# Patient Record
Sex: FEMALE | Race: BLACK OR AFRICAN AMERICAN | Employment: FULL TIME | URBAN - METROPOLITAN AREA
[De-identification: names, ages, dates, MRNs, and addresses within clinical notes are randomized per-mention and may not be internally consistent; named-entity substitution may affect disease eponyms.]

---

## 2017-03-27 PROCEDURE — 99283 EMERGENCY DEPT VISIT LOW MDM: CPT

## 2017-03-28 ENCOUNTER — APPOINTMENT (OUTPATIENT)
Dept: GENERAL RADIOLOGY | Age: 32
End: 2017-03-28
Attending: EMERGENCY MEDICINE
Payer: SELF-PAY

## 2017-03-28 ENCOUNTER — HOSPITAL ENCOUNTER (EMERGENCY)
Age: 32
Discharge: HOME | End: 2017-03-28
Attending: EMERGENCY MEDICINE | Admitting: EMERGENCY MEDICINE
Payer: SELF-PAY

## 2017-03-28 VITALS
WEIGHT: 220 LBS | HEART RATE: 92 BPM | RESPIRATION RATE: 16 BRPM | DIASTOLIC BLOOD PRESSURE: 69 MMHG | TEMPERATURE: 98.2 F | SYSTOLIC BLOOD PRESSURE: 156 MMHG | OXYGEN SATURATION: 98 % | HEIGHT: 60 IN | BODY MASS INDEX: 43.19 KG/M2

## 2017-03-28 DIAGNOSIS — M54.9 BACK PAIN, UNSPECIFIED BACK LOCATION, UNSPECIFIED BACK PAIN LATERALITY, UNSPECIFIED CHRONICITY: ICD-10-CM

## 2017-03-28 DIAGNOSIS — R51.9 NONINTRACTABLE HEADACHE, UNSPECIFIED CHRONICITY PATTERN, UNSPECIFIED HEADACHE TYPE: Primary | ICD-10-CM

## 2017-03-28 LAB
APPEARANCE UR: CLEAR
BILIRUB UR QL: NEGATIVE
COLOR UR: YELLOW
GLUCOSE UR STRIP.AUTO-MCNC: NEGATIVE MG/DL
HCG UR QL: NEGATIVE
HGB UR QL STRIP: NEGATIVE
KETONES UR QL STRIP.AUTO: NEGATIVE MG/DL
LEUKOCYTE ESTERASE UR QL STRIP.AUTO: NEGATIVE
NITRITE UR QL STRIP.AUTO: NEGATIVE
PH UR STRIP: 6.5 [PH] (ref 5–8)
PROT UR STRIP-MCNC: NEGATIVE MG/DL
SP GR UR REFRACTOMETRY: 1.02 (ref 1–1.03)
UROBILINOGEN UR QL STRIP.AUTO: 1 EU/DL (ref 0.2–1)

## 2017-03-28 PROCEDURE — 81025 URINE PREGNANCY TEST: CPT | Performed by: EMERGENCY MEDICINE

## 2017-03-28 PROCEDURE — 81003 URINALYSIS AUTO W/O SCOPE: CPT | Performed by: EMERGENCY MEDICINE

## 2017-03-28 PROCEDURE — 71020 XR CHEST PA LAT: CPT

## 2017-03-28 PROCEDURE — 74011250637 HC RX REV CODE- 250/637: Performed by: EMERGENCY MEDICINE

## 2017-03-28 RX ORDER — DIAZEPAM 2 MG/1
2-4 TABLET ORAL
Qty: 18 TAB | Refills: 0 | Status: SHIPPED | OUTPATIENT
Start: 2017-03-28 | End: 2020-06-11

## 2017-03-28 RX ORDER — BUTALBITAL, ACETAMINOPHEN AND CAFFEINE 300; 40; 50 MG/1; MG/1; MG/1
1 CAPSULE ORAL
Qty: 16 CAP | Refills: 0 | Status: SHIPPED | OUTPATIENT
Start: 2017-03-28 | End: 2020-06-11

## 2017-03-28 RX ORDER — DIAZEPAM 5 MG/1
5 TABLET ORAL
Status: COMPLETED | OUTPATIENT
Start: 2017-03-28 | End: 2017-03-28

## 2017-03-28 RX ADMIN — DIAZEPAM 5 MG: 5 TABLET ORAL at 02:18

## 2017-03-28 NOTE — DISCHARGE INSTRUCTIONS
Return for any new or worsening pain, fever, shortness of breath, vomiting, decreased fluid intake, weakness, numbness, dizziness, or any change or concerns. Back Pain: Care Instructions  Your Care Instructions    Back pain has many possible causes. It is often related to problems with muscles and ligaments of the back. It may also be related to problems with the nerves, discs, or bones of the back. Moving, lifting, standing, sitting, or sleeping in an awkward way can strain the back. Sometimes you don't notice the injury until later. Arthritis is another common cause of back pain. Although it may hurt a lot, back pain usually improves on its own within several weeks. Most people recover in 12 weeks or less. Using good home treatment and being careful not to stress your back can help you feel better sooner. Follow-up care is a key part of your treatment and safety. Be sure to make and go to all appointments, and call your doctor if you are having problems. Its also a good idea to know your test results and keep a list of the medicines you take. How can you care for yourself at home? · Sit or lie in positions that are most comfortable and reduce your pain. Try one of these positions when you lie down:  ¨ Lie on your back with your knees bent and supported by large pillows. ¨ Lie on the floor with your legs on the seat of a sofa or chair. Collene Picher on your side with your knees and hips bent and a pillow between your legs. ¨ Lie on your stomach if it does not make pain worse. · Do not sit up in bed, and avoid soft couches and twisted positions. Bed rest can help relieve pain at first, but it delays healing. Avoid bed rest after the first day of back pain. · Change positions every 30 minutes. If you must sit for long periods of time, take breaks from sitting. Get up and walk around, or lie in a comfortable position.   · Try using a heating pad on a low or medium setting for 15 to 20 minutes every 2 or 3 hours. Try a warm shower in place of one session with the heating pad. · You can also try an ice pack for 10 to 15 minutes every 2 to 3 hours. Put a thin cloth between the ice pack and your skin. · Take pain medicines exactly as directed. ¨ If the doctor gave you a prescription medicine for pain, take it as prescribed. ¨ If you are not taking a prescription pain medicine, ask your doctor if you can take an over-the-counter medicine. · Take short walks several times a day. You can start with 5 to 10 minutes, 3 or 4 times a day, and work up to longer walks. Walk on level surfaces and avoid hills and stairs until your back is better. · Return to work and other activities as soon as you can. Continued rest without activity is usually not good for your back. · To prevent future back pain, do exercises to stretch and strengthen your back and stomach. Learn how to use good posture, safe lifting techniques, and proper body mechanics. When should you call for help? Call your doctor now or seek immediate medical care if:  · You have new or worsening numbness in your legs. · You have new or worsening weakness in your legs. (This could make it hard to stand up.)  · You lose control of your bladder or bowels. Watch closely for changes in your health, and be sure to contact your doctor if:  · Your pain gets worse. · You are not getting better after 2 weeks. Where can you learn more? Go to http://aredn-estelita.info/. Enter Y433 in the search box to learn more about \"Back Pain: Care Instructions. \"  Current as of: May 23, 2016  Content Version: 11.1  © 8168-1914 [employer]. Care instructions adapted under license by [employer] (which disclaims liability or warranty for this information).  If you have questions about a medical condition or this instruction, always ask your healthcare professional. [address] any warranty or liability for your use of this information. Headache: Care Instructions  Your Care Instructions    Headaches have many possible causes. Most headaches aren't a sign of a more serious problem, and they will get better on their own. Home treatment may help you feel better faster. The doctor has checked you carefully, but problems can develop later. If you notice any problems or new symptoms, get medical treatment right away. Follow-up care is a key part of your treatment and safety. Be sure to make and go to all appointments, and call your doctor if you are having problems. It's also a good idea to know your test results and keep a list of the medicines you take. How can you care for yourself at home? · Do not drive if you have taken a prescription pain medicine. · Rest in a quiet, dark room until your headache is gone. Close your eyes and try to relax or go to sleep. Don't watch TV or read. · Put a cold, moist cloth or cold pack on the painful area for 10 to 20 minutes at a time. Put a thin cloth between the cold pack and your skin. · Use a warm, moist towel or a heating pad set on low to relax tight shoulder and neck muscles. · Have someone gently massage your neck and shoulders. · Take pain medicines exactly as directed. ¨ If the doctor gave you a prescription medicine for pain, take it as prescribed. ¨ If you are not taking a prescription pain medicine, ask your doctor if you can take an over-the-counter medicine. · Be careful not to take pain medicine more often than the instructions allow, because you may get worse or more frequent headaches when the medicine wears off. · Do not ignore new symptoms that occur with a headache, such as a fever, weakness or numbness, vision changes, or confusion. These may be signs of a more serious problem. To prevent headaches  · Keep a headache diary so you can figure out what triggers your headaches. Avoiding triggers may help you prevent headaches.  Record when each headache began, how long it lasted, and what the pain was like (throbbing, aching, stabbing, or dull). Write down any other symptoms you had with the headache, such as nausea, flashing lights or dark spots, or sensitivity to bright light or loud noise. Note if the headache occurred near your period. List anything that might have triggered the headache, such as certain foods (chocolate, cheese, wine) or odors, smoke, bright light, stress, or lack of sleep. · Find healthy ways to deal with stress. Headaches are most common during or right after stressful times. Take time to relax before and after you do something that has caused a headache in the past.  · Try to keep your muscles relaxed by keeping good posture. Check your jaw, face, neck, and shoulder muscles for tension, and try relaxing them. When sitting at a desk, change positions often, and stretch for 30 seconds each hour. · Get plenty of sleep and exercise. · Eat regularly and well. Long periods without food can trigger a headache. · Treat yourself to a massage. Some people find that regular massages are very helpful in relieving tension. · Limit caffeine by not drinking too much coffee, tea, or soda. But don't quit caffeine suddenly, because that can also give you headaches. · Reduce eyestrain from computers by blinking frequently and looking away from the computer screen every so often. Make sure you have proper eyewear and that your monitor is set up properly, about an arm's length away. · Seek help if you have depression or anxiety. Your headaches may be linked to these conditions. Treatment can both prevent headaches and help with symptoms of anxiety or depression. When should you call for help? Call 911 anytime you think you may need emergency care. For example, call if:  · You have signs of a stroke. These may include:  ¨ Sudden numbness, paralysis, or weakness in your face, arm, or leg, especially on only one side of your body.   ¨ Sudden vision changes. ¨ Sudden trouble speaking. ¨ Sudden confusion or trouble understanding simple statements. ¨ Sudden problems with walking or balance. ¨ A sudden, severe headache that is different from past headaches. Call your doctor now or seek immediate medical care if:  · You have a new or worse headache. · Your headache gets much worse. Where can you learn more? Go to http://arden-estelita.info/. Enter M271 in the search box to learn more about \"Headache: Care Instructions. \"  Current as of: February 19, 2016  Content Version: 11.1  © 3394-1481 [employer]. Care instructions adapted under license by [employer] (which disclaims liability or warranty for this information). If you have questions about a medical condition or this instruction, always ask your healthcare professional. [address] any warranty or liability for your use of this information.

## 2017-03-28 NOTE — ED PROVIDER NOTES
HPI Comments: 1:43 AM Terrall Cowden is a 32 y.o. female with a history of HTN and anemia presenting to the ED with lower back pain that began today. The patient also reports upper L side back pain, mild feet swelling after standing all day, HA, and dizziness as associated symptoms. She has been off of her HTN medication for one year. Says all sx's have resolved except for left upper back pain. Worse w movement. Resolves w rest. No sob or worsening w breating. No cp. No abd pain. No nausea. No leg pain or swelling. No rash. Pt denies nausea, vomiting, diarrhea, fever, CP, and cough. No other complaints at this time. Patient is a 32 y.o. female presenting with back pain. The history is provided by the patient. Back Pain    Associated symptoms include headaches. Pertinent negatives include no chest pain, no fever and no abdominal pain. Past Medical History:   Diagnosis Date    Anemia     HTN (hypertension)        Past Surgical History:   Procedure Laterality Date    HX  SECTION      HX GYN      tubal ligation         History reviewed. No pertinent family history. Social History     Social History    Marital status:      Spouse name: N/A    Number of children: N/A    Years of education: N/A     Occupational History    Not on file. Social History Main Topics    Smoking status: Never Smoker    Smokeless tobacco: Not on file    Alcohol use No    Drug use: Not on file    Sexual activity: Not on file     Other Topics Concern    Not on file     Social History Narrative         ALLERGIES: Review of patient's allergies indicates no known allergies. Review of Systems   Constitutional: Negative for fever. HENT: Negative for congestion. Respiratory: Negative for cough and shortness of breath. Cardiovascular: Negative for chest pain. Gastrointestinal: Negative for abdominal pain and vomiting. Musculoskeletal: Positive for back pain.    Skin: Negative for rash. Neurological: Positive for headaches. Negative for light-headedness. All other systems reviewed and are negative. Vitals:    03/28/17 0029 03/28/17 0033   BP:  156/69   Pulse: 92    Resp: 16    Temp: 98.2 °F (36.8 °C)    SpO2: 98%    Weight: 99.8 kg (220 lb)    Height: 5' (1.524 m)             Physical Exam   Constitutional: She is oriented to person, place, and time. She appears well-developed. HENT:   Head: Normocephalic. Mouth/Throat: Oropharynx is clear and moist.   Eyes: Pupils are equal, round, and reactive to light. Neck: Normal range of motion. Cardiovascular: Normal rate and normal heart sounds. No murmur heard. Pulmonary/Chest: Effort normal. She has no wheezes. She has no rales. Abdominal: Soft. There is no tenderness. Musculoskeletal: Normal range of motion. She exhibits tenderness (+ left parathoracic ttp/spasm. no mdline ttp.  no erythema/warmth/swelling. ). She exhibits no edema. Neurological: She is alert and oriented to person, place, and time. Skin: Skin is warm and dry. Nursing note and vitals reviewed. [facility]  ED Course       Procedures    Vitals:  No data found. Medications ordered:   Medications   diazePAM (VALIUM) tablet 5 mg (5 mg Oral Given 3/28/17 0218)         Lab findings:  No results found for this or any previous visit (from the past 12 hour(s)). X-Ray, CT or other radiology findings or impressions:  XR CHEST PA LAT   Final Result          Progress notes, Consult notes or additional Procedure notes:   No emc, nvi, not c/w cord compresson/cauda equina/fx/abscess. Stable for dc and close f/u. Det ret inst given. Disposition:  Diagnosis:   1. Nonintractable headache, unspecified chronicity pattern, unspecified headache type    2.  Back pain, unspecified back location, unspecified back pain laterality, unspecified chronicity        Disposition: home    Follow-up Information     Follow up With Details Comments Contact Isaac Grover MD Schedule an appointment as soon as possible for a visit in 2 days  [address] [address]  [phone]             Discharge Medication List as of 3/28/2017  2:49 AM      START taking these medications    Details   butalbital-acetaminophen-caff (FIORICET) -40 mg per capsule Take 1 Cap by mouth every six (6) hours as needed for Pain. Max Daily Amount: 4 Caps., Print, Disp-16 Cap, R-0      diazePAM (VALIUM) 2 mg tablet Take 1-2 Tabs by mouth every eight (8) hours as needed for Anxiety. Max Daily Amount: 12 mg., Print, Disp-18 Tab, R-0         CONTINUE these medications which have NOT CHANGED    Details   guaiFENesin-codeine (ROBITUSSIN AC)  mg/5 mL solution Take 5 mL by mouth three (3) times daily as needed for Cough. Max Daily Amount: 15 mL. , Print, Disp-120 mL, R-0      ondansetron (ZOFRAN ODT) 4 mg disintegrating tablet Take 1 Tab by mouth every eight (8) hours as needed for Nausea. , Print, Disp-6 Tab, R-0         STOP taking these medications       butalbital-acetaminophen-caffeine (FIORICET, ESGIC) -40 mg per tablet Comments:   Reason for Stopping:             SCRIBE ATTESTATION STATEMENT  Documented by: Lucas Ackerman scribing for, and in the presence of,No att. providers found      Signed by: Cornelio Alanis, 03/29/17, 1:49 AM    PROVIDER ATTESTATION STATEMENT  I personally performed the services described in the documentation, reviewed the documentation, as recorded by the scribe in my presence, and it accurately and completely records my words and actions.   No att. providers found

## 2017-03-28 NOTE — ED TRIAGE NOTES
Pt with lower back pain, upper left side back pain ,   Headache , dizziness today   Pt states that she has been off bp meds for 1 year,   Pt c/o slight feet swelling after standing all day at work

## 2017-03-28 NOTE — LETTER
NOTIFICATION RETURN TO WORK / SCHOOL 
 
3/28/2017 2:50 AM 
 
Ms. Marah Roe [address] [facility], [place] [address] To Whom It May Concern: 
 
Marah Roe is currently under the care of [id number] [facility] EMERGENCY DEPT. She will return to work/school on: 3/31/17 If there are questions or concerns please have the patient contact our office.  
 
 
 
Sincerely, 
 
 
 
Verenice Barnes MD

## 2018-03-02 ENCOUNTER — HOSPITAL ENCOUNTER (EMERGENCY)
Age: 33
Discharge: HOME | End: 2018-03-02
Attending: EMERGENCY MEDICINE
Payer: SELF-PAY

## 2018-03-02 VITALS
TEMPERATURE: 97.6 F | DIASTOLIC BLOOD PRESSURE: 91 MMHG | HEART RATE: 88 BPM | RESPIRATION RATE: 20 BRPM | SYSTOLIC BLOOD PRESSURE: 139 MMHG | OXYGEN SATURATION: 99 % | HEIGHT: 60 IN | BODY MASS INDEX: 41.23 KG/M2 | WEIGHT: 210 LBS

## 2018-03-02 DIAGNOSIS — L20.9 ATOPIC DERMATITIS, UNSPECIFIED TYPE: Primary | ICD-10-CM

## 2018-03-02 PROCEDURE — 99282 EMERGENCY DEPT VISIT SF MDM: CPT

## 2018-03-02 RX ORDER — BENAZEPRIL/HYDROCHLOROTHIAZIDE 20 MG-25MG
TABLET ORAL DAILY
COMMUNITY
End: 2020-06-11

## 2018-03-02 RX ORDER — HYDROCORTISONE 1 %
CREAM (GRAM) TOPICAL 2 TIMES DAILY
Qty: 30 G | Refills: 0 | Status: SHIPPED | OUTPATIENT
Start: 2018-03-02 | End: 2020-02-02

## 2018-03-02 RX ORDER — BISMUTH SUBSALICYLATE 262 MG
1 TABLET,CHEWABLE ORAL DAILY
COMMUNITY

## 2018-03-02 NOTE — ED PROVIDER NOTES
EMERGENCY DEPARTMENT HISTORY AND PHYSICAL EXAM    Date: 3/2/2018  Patient Name: Rama Lindsey    History of Presenting Illness     Chief Complaint   Patient presents with    Finger Pain     left hand, ring finger         History Provided By: Patient    Chief Complaint: Rash  Duration: 1 Days  Timing:  Gradual  Location: Left hand  Quality: Burning  Severity: 3 out of 10  Modifying Factors: Improves with motrin  Associated Symptoms: denies any other associated signs or symptoms      Additional History (Context): Rama Lindsey is a 28 y.o. female with hypertension who presents with report of rash to the left hand onset yesterday. Pt states rash began on the dorsal aspect of left ring finger and has since spread to the palmar side of the second and fifth digit as well as to the dorsal aspect of the wrist. No fevers, chills, lesions in mouth. PCP: Chas Donovan MD    Current Outpatient Prescriptions   Medication Sig Dispense Refill    multivitamin (ONE A DAY) tablet Take 1 Tab by mouth daily.  benazepril-hydroCHLOROthiazide (LOTENSIN HCT) 20-25 mg per tablet Take  by mouth daily.  hydrocortisone (CORTISONE) 1 % topical cream Apply  to affected area two (2) times a day. use thin layer 30 g 0    butalbital-acetaminophen-caff (FIORICET) -40 mg per capsule Take 1 Cap by mouth every six (6) hours as needed for Pain. Max Daily Amount: 4 Caps. 16 Cap 0    diazePAM (VALIUM) 2 mg tablet Take 1-2 Tabs by mouth every eight (8) hours as needed for Anxiety. Max Daily Amount: 12 mg. 18 Tab 0    guaiFENesin-codeine (ROBITUSSIN AC)  mg/5 mL solution Take 5 mL by mouth three (3) times daily as needed for Cough. Max Daily Amount: 15 mL. 120 mL 0    ondansetron (ZOFRAN ODT) 4 mg disintegrating tablet Take 1 Tab by mouth every eight (8) hours as needed for Nausea.  6 Tab 0       Past History     Past Medical History:  Past Medical History:   Diagnosis Date    Anemia     HTN (hypertension)        Past Surgical History:  Past Surgical History:   Procedure Laterality Date    HX  SECTION      HX GYN      tubal ligation       Family History:  No family history on file. Social History:  Social History   Substance Use Topics    Smoking status: Never Smoker    Smokeless tobacco: Not on file    Alcohol use No       Allergies:  No Known Allergies      Review of Systems   Review of Systems   Constitutional: Negative for chills and fever. HENT: Negative for mouth sores. Skin: Positive for rash. All Other Systems Negative  Physical Exam     Vitals:    18 1116   BP: (!) 139/91   Pulse: 88   Resp: 20   Temp: 97.6 °F (36.4 °C)   SpO2: 99%   Weight: 95.3 kg (210 lb)   Height: 5' (1.524 m)     Physical Exam   Constitutional: She is oriented to person, place, and time. She appears well-developed and well-nourished. HENT:   Head: Normocephalic and atraumatic. Right Ear: External ear normal.   Left Ear: External ear normal.   Nose: Nose normal.   Mouth/Throat: Oropharynx is clear and moist. No oropharyngeal exudate. No lesions noted. Eyes: Conjunctivae are normal.   Neck: Normal range of motion. Cardiovascular: Normal rate, regular rhythm and normal heart sounds. Pulmonary/Chest: Effort normal and breath sounds normal. No respiratory distress. She has no wheezes. Neurological: She is alert and oriented to person, place, and time. Skin: Skin is warm and dry. Left hand: Papulovesicular lesions noted to the dorsal aspect of the left third digit, three papulovesicular lesions noted to the dorsal aspect of the radial aspect of the wrist and single erythematous macular lesion noted to the palmar aspect of the second digit. No burrows noted between the web spaces bilaterally. No plantar rash. Psychiatric: She has a normal mood and affect. Her behavior is normal.   Vitals reviewed.            Diagnostic Study Results     Labs -   No results found for this or any previous visit (from the past 12 hour(s)). Radiologic Studies -   No orders to display     CT Results  (Last 48 hours)    None        CXR Results  (Last 48 hours)    None            Medical Decision Making   I am the first provider for this patient. I reviewed the vital signs, available nursing notes, past medical history, past surgical history, family history and social history. Vital Signs-Reviewed the patient's vital signs. Pulse Oximetry Analysis - 99% on RA    Records Reviewed: Nursing Notes    Procedures:  Procedures    Provider Notes (Medical Decision Making): Pt with localized rash to the left extremity. Not dermatomal in nature, no other sx. Most c/w atopic dermatitis. Due to mild distribution, do not feel systemic steroids are necessary at this time. Will treat symptoms with anti-histamine and advise topical steroid cream.    MED RECONCILIATION:  No current facility-administered medications for this encounter. Current Outpatient Prescriptions   Medication Sig    multivitamin (ONE A DAY) tablet Take 1 Tab by mouth daily.  benazepril-hydroCHLOROthiazide (LOTENSIN HCT) 20-25 mg per tablet Take  by mouth daily.  hydrocortisone (CORTISONE) 1 % topical cream Apply  to affected area two (2) times a day. use thin layer    butalbital-acetaminophen-caff (FIORICET) -40 mg per capsule Take 1 Cap by mouth every six (6) hours as needed for Pain. Max Daily Amount: 4 Caps.  diazePAM (VALIUM) 2 mg tablet Take 1-2 Tabs by mouth every eight (8) hours as needed for Anxiety. Max Daily Amount: 12 mg.    guaiFENesin-codeine (ROBITUSSIN AC)  mg/5 mL solution Take 5 mL by mouth three (3) times daily as needed for Cough. Max Daily Amount: 15 mL.  ondansetron (ZOFRAN ODT) 4 mg disintegrating tablet Take 1 Tab by mouth every eight (8) hours as needed for Nausea. Disposition:  Discharge. DISCHARGE NOTE:   11:39 AM  All of pt's questions and concerns were addressed.  Patient was instructed and agrees to follow up with PCP next week, as well as to return to the ED upon further deterioration. Patient is ready to go home. Follow-up Information     Follow up With Details Comments [address], MD  as scheduled [address] [facility] EMERGENCY DEPT  As needed, If symptoms worsen [address] [address]  [facility]  for further evaluation of rash [address]  [phone]          Current Discharge Medication List      START taking these medications    Details   hydrocortisone (CORTISONE) 1 % topical cream Apply  to affected area two (2) times a day. use thin layer  Qty: 30 g, Refills: 0                 Diagnosis     Clinical Impression:   1.  Atopic dermatitis, unspecified type            Thomas Minaya PA-C 11:43 AM

## 2018-03-02 NOTE — ED TRIAGE NOTES
C/o raised reddened area to left hand, ring finger accompanied with swelling and numbleness. Attempted to see PCP, unsuccessful buit advised via T/C  to take Benadryl and Motrin. Concerned  ? Spider Bite. Pain 5/10, improved 3/10 on arirval to ED. Pain reduced currently after taking Motrin 400mg  @ home 0800 am. Benadryl taken 03/01/2018 2000.

## 2018-03-02 NOTE — DISCHARGE INSTRUCTIONS
Atopic Dermatitis: Care Instructions  Your Care Instructions  Atopic dermatitis (also called eczema) is a skin problem that causes intense itching and a red, raised rash. In severe cases, the rash develops clear fluid-filled blisters. The rash is not contagious. People with this condition seem to have very sensitive immune systems that are likely to react to things that cause allergies. The immune system is the body's way of fighting infection. There is no cure for atopic dermatitis, but you may be able to control it with care at home. Follow-up care is a key part of your treatment and safety. Be sure to make and go to all appointments, and call your doctor if you are having problems. It's also a good idea to know your test results and keep a list of the medicines you take. How can you care for yourself at home? · Use moisturizer at least twice a day. · If your doctor prescribes a cream, use it as directed. If your doctor prescribes other medicine, take it exactly as directed. · Wash the affected area with water only. Soap can make dryness and itching worse. Pat dry. · Apply a moisturizer after bathing. Use a cream such as Lubriderm, Moisturel, or Cetaphil that does not irritate the skin or cause a rash. Apply the cream while your skin is still damp after lightly drying with a towel. · Use cold, wet cloths to reduce itching. · Keep cool, and stay out of the sun. · If itching affects your normal activities, an over-the-counter antihistamine, such as diphenhydramine (Benadryl) or loratadine (Claritin) may help. Read and follow all instructions on the label. When should you call for help? Call your doctor now or seek immediate medical care if:  ? · Your rash gets worse and you have a fever. ? · You have new blisters or bruises, or the rash spreads and looks like a sunburn. ? · You have signs of infection, such as:  ¨ Increased pain, swelling, warmth, or redness.   ¨ Red streaks leading from the rash.  ¨ Pus draining from the rash. ¨ A fever. ? · You have crusting or oozing sores. ? · You have joint aches or body aches along with your rash. ? Watch closely for changes in your health, and be sure to contact your doctor if:  ? · Your rash does not clear up after 2 to 3 weeks of home treatment. ? · Itching interferes with your sleep or daily activities. Where can you learn more? Go to http://arden-estelita.info/. Enter F939 in the search box to learn more about \"Atopic Dermatitis: Care Instructions. \"  Current as of: October 13, 2016  Content Version: 11.4  © 1952-9391 [employer]. Care instructions adapted under license by [employer] (which disclaims liability or warranty for this information). If you have questions about a medical condition or this instruction, always ask your healthcare professional. [place] [address] any warranty or liability for your use of this information.

## 2018-06-05 ENCOUNTER — HOSPITAL ENCOUNTER (OUTPATIENT)
Dept: ULTRASOUND IMAGING | Age: 33
Discharge: HOME | End: 2018-06-05
Attending: NURSE PRACTITIONER
Payer: SELF-PAY

## 2018-06-05 DIAGNOSIS — N93.8 DYSFUNCTIONAL UTERINE BLEEDING: ICD-10-CM

## 2018-06-05 PROCEDURE — 76830 TRANSVAGINAL US NON-OB: CPT

## 2019-05-07 ENCOUNTER — HOSPITAL ENCOUNTER (OUTPATIENT)
Dept: LAB | Age: 34
Discharge: HOME | End: 2019-05-07
Payer: COMMERCIAL

## 2019-05-07 LAB
ALBUMIN SERPL-MCNC: 3.5 G/DL (ref 3.4–5)
ALBUMIN/GLOB SERPL: 0.8 {RATIO} (ref 0.8–1.7)
ALP SERPL-CCNC: 90 U/L (ref 45–117)
ALT SERPL-CCNC: 21 U/L (ref 13–56)
ANION GAP SERPL CALC-SCNC: 5 MMOL/L (ref 3–18)
AST SERPL-CCNC: 15 U/L (ref 15–37)
BASOPHILS # BLD: 0 K/UL (ref 0–0.1)
BASOPHILS NFR BLD: 1 % (ref 0–2)
BILIRUB SERPL-MCNC: 0.3 MG/DL (ref 0.2–1)
BUN SERPL-MCNC: 12 MG/DL (ref 7–18)
BUN/CREAT SERPL: 15 (ref 12–20)
CALCIUM SERPL-MCNC: 8.7 MG/DL (ref 8.5–10.1)
CHLORIDE SERPL-SCNC: 102 MMOL/L (ref 100–108)
CHOLEST SERPL-MCNC: 181 MG/DL
CO2 SERPL-SCNC: 30 MMOL/L (ref 21–32)
CREAT SERPL-MCNC: 0.8 MG/DL (ref 0.6–1.3)
DIFFERENTIAL METHOD BLD: ABNORMAL
EOSINOPHIL # BLD: 0.3 K/UL (ref 0–0.4)
EOSINOPHIL NFR BLD: 5 % (ref 0–5)
ERYTHROCYTE [DISTWIDTH] IN BLOOD BY AUTOMATED COUNT: 16.2 % (ref 11.6–14.5)
GLOBULIN SER CALC-MCNC: 4.4 G/DL (ref 2–4)
GLUCOSE SERPL-MCNC: 89 MG/DL (ref 74–99)
HCT VFR BLD AUTO: 32.7 % (ref 35–45)
HDLC SERPL-MCNC: 38 MG/DL (ref 40–60)
HDLC SERPL: 4.8 {RATIO} (ref 0–5)
HGB BLD-MCNC: 10.1 G/DL (ref 12–16)
LDLC SERPL CALC-MCNC: 133.2 MG/DL (ref 0–100)
LIPID PROFILE,FLP: ABNORMAL
LYMPHOCYTES # BLD: 2.1 K/UL (ref 0.9–3.6)
LYMPHOCYTES NFR BLD: 37 % (ref 21–52)
MCH RBC QN AUTO: 23.4 PG (ref 24–34)
MCHC RBC AUTO-ENTMCNC: 30.9 G/DL (ref 31–37)
MCV RBC AUTO: 75.7 FL (ref 74–97)
MONOCYTES # BLD: 0.4 K/UL (ref 0.05–1.2)
MONOCYTES NFR BLD: 7 % (ref 3–10)
NEUTS SEG # BLD: 2.8 K/UL (ref 1.8–8)
NEUTS SEG NFR BLD: 50 % (ref 40–73)
PLATELET # BLD AUTO: 516 K/UL (ref 135–420)
PMV BLD AUTO: 8.8 FL (ref 9.2–11.8)
POTASSIUM SERPL-SCNC: 3.9 MMOL/L (ref 3.5–5.5)
PROT SERPL-MCNC: 7.9 G/DL (ref 6.4–8.2)
RBC # BLD AUTO: 4.32 M/UL (ref 4.2–5.3)
SODIUM SERPL-SCNC: 137 MMOL/L (ref 136–145)
TRIGL SERPL-MCNC: 49 MG/DL (ref ?–150)
TSH SERPL DL<=0.05 MIU/L-ACNC: 2.44 UIU/ML (ref 0.36–3.74)
VLDLC SERPL CALC-MCNC: 9.8 MG/DL
WBC # BLD AUTO: 5.6 K/UL (ref 4.6–13.2)

## 2019-05-07 PROCEDURE — 80061 LIPID PANEL: CPT

## 2019-05-07 PROCEDURE — 36415 COLL VENOUS BLD VENIPUNCTURE: CPT

## 2019-05-07 PROCEDURE — 85025 COMPLETE CBC W/AUTO DIFF WBC: CPT

## 2019-05-07 PROCEDURE — 84443 ASSAY THYROID STIM HORMONE: CPT

## 2019-05-07 PROCEDURE — 80053 COMPREHEN METABOLIC PANEL: CPT

## 2020-02-02 ENCOUNTER — HOSPITAL ENCOUNTER (EMERGENCY)
Age: 35
Discharge: HOME | End: 2020-02-02
Attending: EMERGENCY MEDICINE
Payer: COMMERCIAL

## 2020-02-02 VITALS
DIASTOLIC BLOOD PRESSURE: 90 MMHG | HEART RATE: 90 BPM | TEMPERATURE: 98.5 F | WEIGHT: 210 LBS | RESPIRATION RATE: 20 BRPM | BODY MASS INDEX: 41.01 KG/M2 | SYSTOLIC BLOOD PRESSURE: 145 MMHG | OXYGEN SATURATION: 100 %

## 2020-02-02 DIAGNOSIS — D50.0 IRON DEFICIENCY ANEMIA DUE TO CHRONIC BLOOD LOSS: ICD-10-CM

## 2020-02-02 DIAGNOSIS — N93.9 VAGINAL BLEEDING, ABNORMAL: Primary | ICD-10-CM

## 2020-02-02 LAB
ALBUMIN SERPL-MCNC: 3.2 G/DL (ref 3.4–5)
ALBUMIN/GLOB SERPL: 0.7 {RATIO} (ref 0.8–1.7)
ALP SERPL-CCNC: 86 U/L (ref 45–117)
ALT SERPL-CCNC: 18 U/L (ref 13–56)
ANION GAP SERPL CALC-SCNC: 6 MMOL/L (ref 3–18)
APPEARANCE UR: CLEAR
APTT PPP: 28.6 SEC (ref 23–36.4)
AST SERPL-CCNC: 14 U/L (ref 10–38)
BACTERIA URNS QL MICRO: ABNORMAL /HPF
BASOPHILS # BLD: 0 K/UL (ref 0–0.1)
BASOPHILS NFR BLD: 0 % (ref 0–2)
BILIRUB SERPL-MCNC: 0.1 MG/DL (ref 0.2–1)
BILIRUB UR QL: NEGATIVE
BUN SERPL-MCNC: 12 MG/DL (ref 7–18)
BUN/CREAT SERPL: 16 (ref 12–20)
CALCIUM SERPL-MCNC: 8.6 MG/DL (ref 8.5–10.1)
CHLORIDE SERPL-SCNC: 106 MMOL/L (ref 100–111)
CO2 SERPL-SCNC: 28 MMOL/L (ref 21–32)
COLOR UR: YELLOW
CREAT SERPL-MCNC: 0.75 MG/DL (ref 0.6–1.3)
DIFFERENTIAL METHOD BLD: ABNORMAL
EOSINOPHIL # BLD: 0.2 K/UL (ref 0–0.4)
EOSINOPHIL NFR BLD: 2 % (ref 0–5)
EPITH CASTS URNS QL MICRO: NEGATIVE /LPF (ref 0–5)
ERYTHROCYTE [DISTWIDTH] IN BLOOD BY AUTOMATED COUNT: 16.8 % (ref 11.6–14.5)
GLOBULIN SER CALC-MCNC: 4.5 G/DL (ref 2–4)
GLUCOSE SERPL-MCNC: 131 MG/DL (ref 74–99)
GLUCOSE UR STRIP.AUTO-MCNC: NEGATIVE MG/DL
HCG SERPL QL: NEGATIVE
HCG UR QL: NEGATIVE
HCT VFR BLD AUTO: 27.8 % (ref 35–45)
HGB BLD-MCNC: 8.3 G/DL (ref 12–16)
HGB UR QL STRIP: ABNORMAL
INR PPP: 1.1 (ref 0.8–1.2)
KETONES UR QL STRIP.AUTO: NEGATIVE MG/DL
LEUKOCYTE ESTERASE UR QL STRIP.AUTO: NEGATIVE
LIPASE SERPL-CCNC: 133 U/L (ref 73–393)
LYMPHOCYTES # BLD: 2.4 K/UL (ref 0.9–3.6)
LYMPHOCYTES NFR BLD: 30 % (ref 21–52)
MCH RBC QN AUTO: 22.5 PG (ref 24–34)
MCHC RBC AUTO-ENTMCNC: 29.9 G/DL (ref 31–37)
MCV RBC AUTO: 75.3 FL (ref 74–97)
MONOCYTES # BLD: 0.7 K/UL (ref 0.05–1.2)
MONOCYTES NFR BLD: 8 % (ref 3–10)
NEUTS SEG # BLD: 4.7 K/UL (ref 1.8–8)
NEUTS SEG NFR BLD: 60 % (ref 40–73)
NITRITE UR QL STRIP.AUTO: NEGATIVE
PH UR STRIP: 5.5 [PH] (ref 5–8)
PLATELET # BLD AUTO: 538 K/UL (ref 135–420)
PMV BLD AUTO: 9 FL (ref 9.2–11.8)
POTASSIUM SERPL-SCNC: 3.7 MMOL/L (ref 3.5–5.5)
PROT SERPL-MCNC: 7.7 G/DL (ref 6.4–8.2)
PROT UR STRIP-MCNC: NEGATIVE MG/DL
PROTHROMBIN TIME: 13.8 SEC (ref 11.5–15.2)
RBC # BLD AUTO: 3.69 M/UL (ref 4.2–5.3)
RBC #/AREA URNS HPF: ABNORMAL /HPF (ref 0–5)
SODIUM SERPL-SCNC: 140 MMOL/L (ref 136–145)
SP GR UR REFRACTOMETRY: 1.02 (ref 1–1.03)
UROBILINOGEN UR QL STRIP.AUTO: 1 EU/DL (ref 0.2–1)
WBC # BLD AUTO: 7.9 K/UL (ref 4.6–13.2)
WBC URNS QL MICRO: ABNORMAL /HPF (ref 0–4)

## 2020-02-02 PROCEDURE — 81001 URINALYSIS AUTO W/SCOPE: CPT

## 2020-02-02 PROCEDURE — 74011000250 HC RX REV CODE- 250: Performed by: EMERGENCY MEDICINE

## 2020-02-02 PROCEDURE — 81025 URINE PREGNANCY TEST: CPT

## 2020-02-02 PROCEDURE — 96365 THER/PROPH/DIAG IV INF INIT: CPT

## 2020-02-02 PROCEDURE — 74011000258 HC RX REV CODE- 258: Performed by: EMERGENCY MEDICINE

## 2020-02-02 PROCEDURE — 99285 EMERGENCY DEPT VISIT HI MDM: CPT

## 2020-02-02 PROCEDURE — 84703 CHORIONIC GONADOTROPIN ASSAY: CPT

## 2020-02-02 PROCEDURE — 83690 ASSAY OF LIPASE: CPT

## 2020-02-02 PROCEDURE — 85610 PROTHROMBIN TIME: CPT

## 2020-02-02 PROCEDURE — 85730 THROMBOPLASTIN TIME PARTIAL: CPT

## 2020-02-02 PROCEDURE — 85025 COMPLETE CBC W/AUTO DIFF WBC: CPT

## 2020-02-02 PROCEDURE — 80053 COMPREHEN METABOLIC PANEL: CPT

## 2020-02-02 RX ADMIN — TRANEXAMIC ACID 1000 MG: 100 INJECTION, SOLUTION INTRAVENOUS at 05:09

## 2020-02-02 NOTE — ED PROVIDER NOTES
HPI patient is a 49-year-old female with metromenorrhagia. Patient states she has been having her period for a month now and tonight she passed a big large clot and became afraid. No abdominal pain. Past Medical History:   Diagnosis Date    Anemia     HTN (hypertension)        Past Surgical History:   Procedure Laterality Date    HX  SECTION      HX GYN      tubal ligation         History reviewed. No pertinent family history. Social History     Socioeconomic History    Marital status:      Spouse name: Not on file    Number of children: Not on file    Years of education: Not on file    Highest education level: Not on file   Occupational History    Not on file   Social Needs    Financial resource strain: Not on file    Food insecurity:     Worry: Not on file     Inability: Not on file    Transportation needs:     Medical: Not on file     Non-medical: Not on file   Tobacco Use    Smoking status: Never Smoker   Substance and Sexual Activity    Alcohol use: No    Drug use: Not on file    Sexual activity: Not on file   Lifestyle    Physical activity:     Days per week: Not on file     Minutes per session: Not on file    Stress: Not on file   Relationships    Social connections:     Talks on phone: Not on file     Gets together: Not on file     Attends [employer] service: Not on file     Active member of club or organization: Not on file     Attends meetings of clubs or organizations: Not on file     Relationship status: Not on file    Intimate partner violence:     Fear of current or ex partner: Not on file     Emotionally abused: Not on file     Physically abused: Not on file     Forced sexual activity: Not on file   Other Topics Concern    Not on file   Social History Narrative    Not on file         ALLERGIES: Patient has no known allergies. Review of Systems   Constitutional: Negative. HENT: Negative. Eyes: Negative. Respiratory: Negative.     Cardiovascular: Negative. Gastrointestinal: Negative. Endocrine: Negative. Genitourinary: Negative. Musculoskeletal: Negative. Skin: Negative. Allergic/Immunologic: Negative. Neurological: Negative. Hematological: Negative. Psychiatric/Behavioral: Negative. All other systems reviewed and are negative. Vitals:    02/02/20 0302   BP: (!) 155/99   Pulse: 96   Resp: 16   Temp: 99.2 °F (37.3 °C)   SpO2: 99%   Weight: 95.3 kg (210 lb)            Physical Exam  Vitals signs and nursing note reviewed. Constitutional:       General: She is not in acute distress. Appearance: She is well-developed. She is not diaphoretic. HENT:      Head: Normocephalic. Right Ear: External ear normal.      Left Ear: External ear normal.      Mouth/Throat:      Pharynx: No oropharyngeal exudate. Eyes:      General: No scleral icterus. Right eye: No discharge. Left eye: No discharge. Conjunctiva/sclera: Conjunctivae normal.      Pupils: Pupils are equal, round, and reactive to light. Neck:      Musculoskeletal: Normal range of motion and neck supple. Thyroid: No thyromegaly. Vascular: No JVD. Trachea: No tracheal deviation. Cardiovascular:      Rate and Rhythm: Normal rate and regular rhythm. Heart sounds: Normal heart sounds. No murmur. No friction rub. No gallop. Pulmonary:      Effort: Pulmonary effort is normal. No respiratory distress. Breath sounds: Normal breath sounds. No stridor. No wheezing or rales. Chest:      Chest wall: No tenderness. Abdominal:      General: Bowel sounds are normal. There is no distension. Palpations: Abdomen is soft. There is no mass. Tenderness: There is no abdominal tenderness. There is no guarding or rebound. Genitourinary:     Vagina: Bleeding present. No vaginal discharge or tenderness. Cervix: No cervical motion tenderness or discharge.       Uterus: Normal.       Adnexa: Right adnexa normal and left adnexa normal.   Musculoskeletal: Normal range of motion. General: No tenderness. Lymphadenopathy:      Cervical: No cervical adenopathy. Skin:     General: Skin is warm and dry. Coloration: Skin is not pale. Findings: No erythema or rash. Neurological:      Mental Status: She is alert and oriented to person, place, and time. Cranial Nerves: No cranial nerve deficit. Motor: No abnormal muscle tone. Coordination: Coordination normal.      Deep Tendon Reflexes: Reflexes normal.          MDM       Procedures     Patient was treated with IV Tranexamic acid without complication. Differential diagnosis: Metromenorrhagia, anemia, thrombocytopenia,    Henrique: abnormal vaginal bleeding    Disp: D/C   Home    Dictation disclaimer:  Please note that this dictation was completed with [employer], the computer voice recognition software. Quite often unanticipated grammatical, syntax, homophones, and other interpretive errors are inadvertently transcribed by the computer software. Please disregard these errors. Please excuse any errors that have escaped final proofreading.

## 2020-02-02 NOTE — ED NOTES
Pt instructed to follow up in am without fail for follow up with her ob gyn, and to return to ED for worsening bleeding/dizziness/shortness of breath/chest pain/other concerns.   Pt voiced her understanding

## 2020-02-02 NOTE — LETTER
[facility] EMERGENCY DEPT 
[address] [address] [phone] Work/School Note Date: 2/2/2020 To Whom It May concern: 
 
Bharath Haque was seen and treated today in the emergency room by the following provider(s): 
No providers found. Bharath Haque - please excuse her from work 2/2 and 2/3 ([phone]). Sincerely, Jamie Reid RN

## 2020-02-02 NOTE — ED TRIAGE NOTES
Vaginal bleeding x 1 month; states she came in for one large clot. At this time feels WNL but had earlier felt lightheaded/weak. States she episodes of dysfuntional vaginal bleeding over the lastg two years s/p tubal ligation and usually takes multi vitamin.   Pt will see ob gyn Feb 12

## 2020-02-02 NOTE — ED NOTES
Pt resting comfortably in bed without distress; affect remains calm/conversant, respirations regular/non labored, skin warm and dry. Instructed to follow up with her ob gyn tomorrow am without fail, and to return to closes ED for worsening bleeding, chest pain, dyspnea, or other concerns. Pt voiced her understanding.

## 2020-02-02 NOTE — DISCHARGE INSTRUCTIONS
Abnormal Uterine Bleeding: Care Instructions  Your Care Instructions    Abnormal uterine bleeding (AUB) is irregular bleeding from the uterus that is longer or heavier than usual or does not occur at your regular time. Sometimes it is caused by changes in hormone levels. It can also be caused by growths in the uterus, such as fibroids or polyps. Sometimes a cause cannot be found. You may have heavy bleeding when you are not expecting your period. Your doctor may suggest a pregnancy test, if you think you are pregnant. Follow-up care is a key part of your treatment and safety. Be sure to make and go to all appointments, and call your doctor if you are having problems. It's also a good idea to know your test results and keep a list of the medicines you take. How can you care for yourself at home? · Be safe with medicines. Take pain medicines exactly as directed. ? If the doctor gave you a prescription medicine for pain, take it as prescribed. ? If you are not taking a prescription pain medicine, ask your doctor if you can take an over-the-counter medicine. · You may be low in iron because of blood loss. Eat a balanced diet that is high in iron and vitamin C. Foods rich in iron include red meat, shellfish, eggs, beans, and leafy green vegetables. Talk to your doctor about whether you need to take iron pills or a multivitamin. When should you call for help? Call 911 anytime you think you may need emergency care. For example, call if:    · You passed out (lost consciousness).    Call your doctor now or seek immediate medical care if:    · You have new or worse belly or pelvic pain.     · You have severe vaginal bleeding.     · You feel dizzy or lightheaded, or you feel like you may faint.    Watch closely for changes in your health, and be sure to contact your doctor if:    · You think you may be pregnant.     · Your bleeding gets worse.     · You do not get better as expected.    Where can you learn more?  Go to http://arden-estelita.info/. Enter E770 in the search box to learn more about \"Abnormal Uterine Bleeding: Care Instructions. \"  Current as of: February 19, 2019  Content Version: 12.2  © 7957-9142 [employer]. Care instructions adapted under license by [employer] (which disclaims liability or warranty for this information). If you have questions about a medical condition or this instruction, always ask your healthcare professional. [address] any warranty or liability for your use of this information. Patient Education        Abnormal Uterine Bleeding: Care Instructions  Your Care Instructions    Abnormal uterine bleeding (AUB) is irregular bleeding from the uterus that is longer or heavier than usual or does not occur at your regular time. Sometimes it is caused by changes in hormone levels. It can also be caused by growths in the uterus, such as fibroids or polyps. Sometimes a cause cannot be found. You may have heavy bleeding when you are not expecting your period. Your doctor may suggest a pregnancy test, if you think you are pregnant. Follow-up care is a key part of your treatment and safety. Be sure to make and go to all appointments, and call your doctor if you are having problems. It's also a good idea to know your test results and keep a list of the medicines you take. How can you care for yourself at home? · Be safe with medicines. Take pain medicines exactly as directed. ? If the doctor gave you a prescription medicine for pain, take it as prescribed. ? If you are not taking a prescription pain medicine, ask your doctor if you can take an over-the-counter medicine. · You may be low in iron because of blood loss. Eat a balanced diet that is high in iron and vitamin C. Foods rich in iron include red meat, shellfish, eggs, beans, and leafy green vegetables.  Talk to your doctor about whether you need to take iron pills or a multivitamin. When should you call for help? Call 911 anytime you think you may need emergency care. For example, call if:    · You passed out (lost consciousness).    Call your doctor now or seek immediate medical care if:    · You have new or worse belly or pelvic pain.     · You have severe vaginal bleeding.     · You feel dizzy or lightheaded, or you feel like you may faint.    Watch closely for changes in your health, and be sure to contact your doctor if:    · You think you may be pregnant.     · Your bleeding gets worse.     · You do not get better as expected. Where can you learn more? Go to http://arden-estelita.info/. Enter Y595 in the search box to learn more about \"Abnormal Uterine Bleeding: Care Instructions. \"  Current as of: February 19, 2019  Content Version: 12.2  © 0468-9363 [employer]. Care instructions adapted under license by [employer] (which disclaims liability or warranty for this information). If you have questions about a medical condition or this instruction, always ask your healthcare professional. [address] any warranty or liability for your use of this information.

## 2022-01-02 ENCOUNTER — HOSPITAL ENCOUNTER (EMERGENCY)
Age: 37
Discharge: HOME | End: 2022-01-02
Attending: STUDENT IN AN ORGANIZED HEALTH CARE EDUCATION/TRAINING PROGRAM
Payer: COMMERCIAL

## 2022-01-02 VITALS
OXYGEN SATURATION: 97 % | HEIGHT: 60 IN | DIASTOLIC BLOOD PRESSURE: 100 MMHG | HEART RATE: 91 BPM | WEIGHT: 260 LBS | TEMPERATURE: 98.1 F | BODY MASS INDEX: 51.04 KG/M2 | RESPIRATION RATE: 20 BRPM | SYSTOLIC BLOOD PRESSURE: 172 MMHG

## 2022-01-02 DIAGNOSIS — Z20.822 PERSON UNDER INVESTIGATION FOR COVID-19: Primary | ICD-10-CM

## 2022-01-02 LAB — SARS-COV-2, COV2: NORMAL

## 2022-01-02 PROCEDURE — 99283 EMERGENCY DEPT VISIT LOW MDM: CPT

## 2022-01-02 PROCEDURE — U0003 INFECTIOUS AGENT DETECTION BY NUCLEIC ACID (DNA OR RNA); SEVERE ACUTE RESPIRATORY SYNDROME CORONAVIRUS 2 (SARS-COV-2) (CORONAVIRUS DISEASE [COVID-19]), AMPLIFIED PROBE TECHNIQUE, MAKING USE OF HIGH THROUGHPUT TECHNOLOGIES AS DESCRIBED BY CMS-2020-01-R: HCPCS

## 2022-01-02 NOTE — DISCHARGE INSTRUCTIONS
Please quarantine as the instructions outlined until you receive your results within 1 to 3 days. Take Mucinex that you can find over-the-counter and stay well-hydrated. Follow-up with your PCP within 24 to 48 hours.

## 2022-01-02 NOTE — ED TRIAGE NOTES
Patient with covid and flu symptoms since Thursday. Patient received covid boosters on Wednesday the day before. Patient needs work excuse note.

## 2022-01-02 NOTE — Clinical Note
[address] [place] [address] [facility]  [address] [address] [phone]    Work/School Note    Date: 1/2/2022     To Whom It May concern:    Sony Gomes was evaluated by the following provider(s):  Attending Provider: Nellie Severance virus is suspected. Per the CDC guidelines we recommend home isolation until the following conditions are all met:    1. At least five days have passed since symptoms first appeared and/or had a close exposure,   2. After home isolation for five days, wearing a mask around others for the next five days,  3. At least 24 have passed since last fever without the use of fever-reducing medications and  4.  Symptoms (eg cough, shortness of breath) have improved      Sincerely,          Sean Garcias, DO

## 2022-01-02 NOTE — ED NOTES
EMERGENCY DEPARTMENT HISTORY AND PHYSICAL EXAM      Patient Name: Teo Vaughan    History of Presenting Illness     HPI:     77-year-old female who is vaccinated against COVID-19 presents to the ED for evaluation of sinus congestion, chest congestion, and worsening cough since last Thursday. She received her COVID-19 booster on the day prior. She has been taking Tylenol for her discomfort however states her symptoms may be worsening. Denies any dyspnea, chest pain, nausea, vomiting abdominal pain, diarrhea. 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 PCP: Eleonora Gupta NP    No current facility-administered medications on file prior to encounter. Current Outpatient Medications on File Prior to Encounter   Medication Sig Dispense Refill    miSOPROStoL (CYTOTEC) 200 mcg tablet Take 1 Tab by mouth once.  ibuprofen (MOTRIN) 600 mg tablet Take 1 Tab by mouth every six (6) hours as needed for Pain. 60 Tab 1    ferrous sulfate (Iron) 325 mg (65 mg iron) tablet Take  by mouth Daily (before breakfast).  docusate sodium (Stool Softener) 100 mg capsule Take 100 mg by mouth two (2) times a day.  PROGESTERONE 10 mg by Does Not Apply route two (2) times a day.  hydroCHLOROthiazide (HYDRODIURIL) 25 mg tablet Take 25 mg by mouth daily.  atorvastatin (LIPITOR) 20 mg tablet Take 20 mg by mouth daily.  amLODIPine (Norvasc) 5 mg tablet Take 5 mg by mouth daily.  cyanocobalamin (Vitamin B-12) 100 mcg tablet Take 100 mcg by mouth daily.  OTHER Iron infusions   Once a month  Last infusion was 4/14/2020      multivitamin (ONE A DAY) tablet Take 1 Tab by mouth daily.          Past History     Past Medical History:  Past Medical History:   Diagnosis Date    Adverse effect of anesthesia     after c sections, pt get very cold and has trembles/shakes     Anemia     HTN (hypertension)        Past Surgical History:  Past Surgical History:   Procedure Laterality Date    HX  SECTION      x4    HX GYN      tubal ligation       Family History:  No family history on file. Social History:  Social History     Tobacco Use    Smoking status: Never Smoker    Smokeless tobacco: Never Used   Substance Use Topics    Alcohol use: No    Drug use: Not on file       Allergies:  No Known Allergies    Review of Nursing Notes: I have reviewed the relevant nursing notes that were available at the time of this entry. Portions of the Family and Social history, as well as medications and allergies, may have been entered into my documentation by nursing or other ancillary staff; I have confirmed and may have supplemented that information to the best of my ability at the time the note was reviewed. There are some disagreements between the nursing notes and my evaluation at times. Some of the above referenced nursing documentation appears in my note after completion of my review. Review of Systems     CONSTITUTIONAL: Denies fevers, chills, sweats, or weight changes. EYES: Denies any visual changes or symptoms  HENT: Denies headaches, changes to hearing, sore throat, dysphagia, or change in voice. CV: Denies chest pains or palpitations. Lungs/Chest: Denies dyspnea, wheezing. GI: Denies nausea, vomiting, diarrhea, constipation, abdominal pain, hematochezia, or melena. : Denies urinary retention or incontinence. No genital discharge. No dysuria. MSK: Denies myalgias or arthralgias. NEURO: Denies chronic headaches or seizures. No numbness, tingling or weakness. PSYCHIATRIC: Denies problems with mood disturbance and anxiety. DERMATOLOGIC: Denies any rashes or skin changes. Physical Exam     General: In no apparent distress. Well-nourished/well-developed. Head/Neck: Normocephalic, atraumatic. Nontender midline neck, normal ROM. EENT: PERRLA. EOM intact bilaterally.  Oropharyngeal mucosa is moist, lesions or erythema. No nasal discharge. Cardiovascular: RRR, no murmurs, gallops, or rubs. Peripheral pulses normal and intact in BUE and BLE. Lungs/Chest: CTAB, no wheezing, rhonchi, or rales. Abdomen: No distention. No organomegaly. No rebound, rigidity, or guarding. Nontender. Extremities/Skin: Warm distal extremities No deformities. No edema. No rashes. Neuro: A&O x 3. Grossly intact sensations and motor function in upper and lower extremities bilaterally. Psych: Appropriate mood and affect. Diagnostic Study Results     Labs -   No results found for this or any previous visit (from the past 12 hour(s)). Radiologic Studies -   No orders to display     CT Results  (Last 48 hours)    None        CXR Results  (Last 48 hours)    None          Medical Decision Making     I am the first provider for this patient. Vital Signs- I personally reviewed and interpreted the patient's vital signs. Patient Vitals for the past 12 hrs:   Temp Pulse Resp BP SpO2   01/02/22 1427 98.2 °F (36.8 °C) (!) 103 18 (!) 174/102 97 %     Records Reviewed: I reviewed the patient's records to interpret any previous medical data available to me including EKGs, previous medical records, previous images, previous labs. Provider Notes (Medical Decision Making):     59-year-old female who is vaccinated against OFAZN-18 and received her booster last Wednesday, began to have sinus and chest congestion since last Thursday. Worsening. 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 I think this is reasonable. Vitals overall normal other than mild tachycardia. She is well-appearing on exam, exam is benign, CTAB. Will obtain send out COVID-19 test and provide quarantine/isolation precautions.     Ras Hoskins, DO  Date: 1/2/2022

## 2022-01-03 LAB — SARS-COV-2, NAA: DETECTED

## 2022-05-06 ENCOUNTER — OFFICE VISIT (OUTPATIENT)
Dept: SURGERY | Age: 37
End: 2022-05-06
Payer: COMMERCIAL

## 2022-05-06 VITALS
HEART RATE: 94 BPM | DIASTOLIC BLOOD PRESSURE: 96 MMHG | TEMPERATURE: 98 F | RESPIRATION RATE: 18 BRPM | SYSTOLIC BLOOD PRESSURE: 144 MMHG | WEIGHT: 266 LBS | BODY MASS INDEX: 52.22 KG/M2 | OXYGEN SATURATION: 99 % | HEIGHT: 60 IN

## 2022-05-06 DIAGNOSIS — E66.01 MORBID OBESITY WITH BMI OF 50.0-59.9, ADULT (HCC): ICD-10-CM

## 2022-05-06 DIAGNOSIS — E66.01 MORBID OBESITY WITH BMI OF 50.0-59.9, ADULT (HCC): Primary | ICD-10-CM

## 2022-05-06 PROCEDURE — 99205 OFFICE O/P NEW HI 60 MIN: CPT | Performed by: SURGERY

## 2022-05-06 NOTE — PROGRESS NOTES
Chief Complaint   Patient presents with    Advice Only     confirmed video     Pt ID confirmed    Weight Loss Metrics 5/6/2022 5/6/2022 1/2/2022 6/12/2020 2/2/2020 3/2/2018 3/28/2017   Pre op / Initial Wt 266 - - - - - -   Today's Wt - 266 lb 260 lb 250 lb 3.6 oz 210 lb 210 lb 220 lb   BMI - 51.95 kg/m2 50.78 kg/m2 47.28 kg/m2 41.01 kg/m2 41.01 kg/m2 42.97 kg/m2   Ideal Body Wt 120 - - - - - -   Excess Body Wt 146 - - - - - -   Goal Wt 149 - - - - - -   Wt loss to date 0 - - - - - -   % Wt Loss 0 - - - - - -   80% .8 - - - - - -       Body mass index is 51.95 kg/m². Ms. Sonu Denney has been given the following recommendations today due to her elevated BP reading: pt to notify pcp regarding blood pressure readings.

## 2022-05-06 NOTE — PROGRESS NOTES
Consult    Patient: Kathleen Espinal MRN: [id number]  SSN: [id number]    YOB: 1985  Age: 39 y.o. Sex: female      Initial  Consultation for Bariatric Surgery     Kathleen Espinal is a 28-year-old black female presents for discussion surgical options available in the management of her clinically severe obesity. Onset obesity: Childhood  Weight at age 25: 150 pounds on a 5 five 0 inch frame  Maximum/current weight: 266 pounds on a 5 foot 0 inch frame with a body mass index of 52  Pattern/progression of weight gain: Slowly progressive interrupted by dietary weight loss followed by regain of lost weight as well as additional weight thus exhibiting the yoyo effect after current maximum weight of 266 pounds  Max medical weight loss attempts: Multiple unsupervised and supervised weight loss trials of maximal loss occurring in 2019 losing 20 pounds over 3 months  Comorbidities: Hypertension, prediabetes, hypercholesterolemia, gastroesophageal reflux disease, stress urinary incontinence, weight related arthropathy-back, knees  Current weight: 6. BMI: 52. Ideal body weight: 120  Excess body weight: 46  Estimated postsurgical weight loss based on 80% loss of her excess body weight: 117  Postsurgical goal weight: 149  Allergies: No known drug allergies  Current medications: See medication list  Past medical history:  1. Super obesity with body mass index of 52 with obesity comorbidities of hypertension, prediabetes, hypercholesterolemia, gastroesophageal reflux disease, stress urinary incontinence, weight related arthropathy-back, knees  2. History of iron deficiency anemia  3. History of vitamin B12 deficiency  Past surgical history:  1.  sections x4 , , ,   2. Bilateral tubal ligation   3. D&C, hysteroscopy, initial ablation  tobacco: None  Alcohol: Socially  Family history:   Mother 64-hypertension  Father 71-hypertension, coronary artery disease  Siblings x4-hypertension, clinically severe obesity  Social history:      No Known Allergies    Current Outpatient Medications on File Prior to Visit   Medication Sig Dispense Refill    ibuprofen (MOTRIN) 600 mg tablet Take 1 Tab by mouth every six (6) hours as needed for Pain. 60 Tab 1    ferrous sulfate (Iron) 325 mg (65 mg iron) tablet Take  by mouth Daily (before breakfast).  docusate sodium (Stool Softener) 100 mg capsule Take 100 mg by mouth two (2) times a day.  hydroCHLOROthiazide (HYDRODIURIL) 25 mg tablet Take 25 mg by mouth daily.  atorvastatin (LIPITOR) 20 mg tablet Take 20 mg by mouth daily.  amLODIPine (Norvasc) 5 mg tablet Take 5 mg by mouth daily.  cyanocobalamin (Vitamin B-12) 100 mcg tablet Take 100 mcg by mouth daily.  OTHER Iron infusions   Once a month  Last infusion was 2020      multivitamin (ONE A DAY) tablet Take 1 Tab by mouth daily.  miSOPROStoL (CYTOTEC) 200 mcg tablet Take 1 Tab by mouth once. (Patient not taking: Reported on 2022)      PROGESTERONE 10 mg by Does Not Apply route two (2) times a day. (Patient not taking: Reported on 2022)       No current facility-administered medications on file prior to visit. Past Medical History:   Diagnosis Date    Adverse effect of anesthesia     after c sections, pt get very cold and has trembles/shakes     Anemia     HTN (hypertension)        Past Surgical History:   Procedure Laterality Date    HX  SECTION      x4    HX GYN      tubal ligation,ablation       Social History     Tobacco Use    Smoking status: Never Smoker    Smokeless tobacco: Never Used   Substance Use Topics    Alcohol use: No    Drug use: Never       Family History   Problem Relation Age of Onset    Hypertension Mother     Hypertension Father          Review of Systems:      General: Denies fevers, chills, night sweats, fatigue, weight loss, or weight gain.     HEENT: Denies changes in auditory or visual acuity, recurrent pharyngitis, epistaxis, chronic rhinorrhea, vertigo    Respiratory: Denies increasing shortness of breath, productive cough, hemoptysis    Cardiac: Denies known history of cardiac disease, heart murmur, palpitations    GI: Denies dysphagia, recurrent emesis, hematemesis, changes in bowel habits, hematochezia, melena    : Denies hematuria frequency urgency dysuria    Musculoskeletal: Denies fractures, dislocations    Neurologic: Denies history of CVA, paralysis paresthesias, recurrent cephalgia, seizures    Endocrine: Denies polyuria, polydipsia, polyphagia, heat and cold intolerance    Lymph/heme: Denies a history of malignancy, anemia, bruising, blood transfusions    Integumentary: Negative for dermatitis         Physical Exam    Visit Vitals  BP (!) 144/96   Pulse 94   Temp 98 °F (36.7 °C)   Resp 18   Ht 5' (1.524 m)   Wt 120.7 kg (266 lb)   SpO2 99%   BMI 51.95 kg/m²       Nursing note reviewed. General: Clinically severely obese in no acute distress, nontoxic in appearance. Head: Normocephalic, atraumatic  Mouth: Clear, no overt lesions, oral mucosa is pink and moist.  Neck: Supple, no masses, no adenopathy or carotid bruits, trachea midline  Resp: Clear to auscultation bilaterally, no wheezing, rhonchi, or rales, excursions normal and symmetrical.  Cardio: Regular rate and rhythm, no murmurs, clicks, gallops, or rubs. Abdomen: Obese, soft, nontender, nondistended, normoactive bowel sounds, no hernias. Extremities: Warm, well perfused, no tenderness or swelling, normal gait/station, without edema or varicosities  Neuro: Sensation and strength grossly intact and symmetrical.  Psych: Alert and oriented to person, place, and time.     Impression/Plan:    78-year-old black female with a body mass index of 52 with obesity comorbidities of hypertension, prediabetes, gastroesophageal reflux disease, hypercholesterolemia, stress urinary incontinence, with related arthropathy in his back, knees who would benefit from bariatric surgery. We have had an extensive discussion with regard to the risks, benefits and likely outcomes of the operation. We've discussed the restrictive and malabsorptive nature of the gastric bypass and compared and contrasted with the sleeve gastrectomy. The patient understands the likelihood of losing approximately 80% of their excess weight in 12 to 18 months. The patient also understands the risks including but not limited to bleeding, infection, need for reoperation, ulcers, leaks and strictures, bowel obstruction secondary to adhesions and internal hernias, DVT, PE, heart attack, stroke, and death. Patient also understands risks of inadequate weight loss, excess weight loss, vitamin insufficiency, protein malnutrition, excess skin, and loss of hair. We have reviewed the components of a successful postoperative course including requirement for a high protein, low carbohydrate diet, 60 oz a day of zero calorie liquids, daily vitamin supplementation, daily exercise, regular follow-up, and participation in support groups.  At this time we will enroll the patient in our bariatric program, undertake routine laboratory evaluation, chest X-ray, EKG, possible UGI and evaluation by  nutritionist as well as psychologist and pending their satisfactory completion of the preop evaluation, plan to pursue laparoscopic potentially open gastric bypass to achieve definitive durable weight loss on a personal level with expected resolution of obesity related comorbidities

## 2022-05-10 ENCOUNTER — HOSPITAL ENCOUNTER (OUTPATIENT)
Dept: LAB | Age: 37
Discharge: HOME | End: 2022-05-10

## 2022-05-10 ENCOUNTER — CLINICAL SUPPORT (OUTPATIENT)
Dept: SURGERY | Age: 37
End: 2022-05-10

## 2022-05-10 DIAGNOSIS — Z01.818 PRE-OP EXAM: ICD-10-CM

## 2022-05-10 DIAGNOSIS — E66.01 MORBID OBESITY WITH BMI OF 50.0-59.9, ADULT (HCC): Primary | ICD-10-CM

## 2022-05-10 DIAGNOSIS — E66.01 MORBID OBESITY WITH BMI OF 50.0-59.9, ADULT (HCC): ICD-10-CM

## 2022-05-10 LAB — XX-LABCORP SPECIMEN COL,LCBCF: NORMAL

## 2022-05-10 PROCEDURE — 99001 SPECIMEN HANDLING PT-LAB: CPT

## 2022-05-12 ENCOUNTER — HOSPITAL ENCOUNTER (OUTPATIENT)
Dept: BARIATRICS/WEIGHT MGMT | Age: 37
Discharge: HOME | End: 2022-05-12

## 2022-05-12 ENCOUNTER — DOCUMENTATION ONLY (OUTPATIENT)
Dept: BARIATRICS/WEIGHT MGMT | Age: 37
End: 2022-05-12

## 2022-05-12 LAB — UREA BREATH TEST QL: NEGATIVE

## 2022-05-12 NOTE — PROGRESS NOTES
[address] [facility], [address]    Patient's Name: Randy Miranda   Age: 39 y.o. YOB: 1985   Sex: female        Insurance:              Session: 1 of 6  Revision:     Surgeon:  Dr. Kathleen Pruett    Height: 5 f    Weight:    266      Lbs. BMI:    Pounds Lost since last month: 0                 Pounds Gained since last month: 0    Starting Weight: 266     Previous Months Weight: 266  Overall Pounds Lost: 0   Overall Pounds Gained: 0    Patient has not been to support group. Do you smoke? None    Alcohol intake:  Number of drinks at a time:  None  Number of times a week: None    Class Guidelines    Guidelines are reviewed with patient at the start of every class. 1. Patient understands that weight loss trial classes must be consecutive. Patient understands if they miss a class, it is their responsibility to contact me to reschedule class. I will reach out to patient after their first no show. 2.  Patient understands the expectations that weight maintenance/weight loss is expected during the classes. Failure to demonstrate changes may result in one extra month of weight loss trial, followed by going back to see the surgeon. Patient understands that they CAN NOT gain any weight during the weight loss trial.  Gaining weight will result in extra classes. 3. Patient is also instructed to be doing their labs, blood work, psych visit, support group and any other test that the surgeon has used while they are working on their weight loss trial.  4.  Patient was instructed to bring their blue binder to every class and appointment. Other Pertinent Information:     Changes Made Since Last Class: n/a, first class    Eating Habits and Behaviors      Today in class we talked about the key diet principles.   We start off each class talking about these principles, which include cutting out liquid calories and focusing on water or other non-calorie, non-carbonated drinks. We also spent time talking about carbohydrates, including foods that have carbohydrates and the goal to keep daily carbohydrates under 100 grams per day. Patient was given ideas of meal and snack choices that are lower in carbohydrates and focus more on protein. Patient was encouraged to start trying protein shakes and was given a list of suggestions. The main topic of class today was: Portion Control. We reviewed in class a power point filled with tips on ways to control portions, including using smaller plates, boxing up portions at a restaurant before starting to eat, and not eating from the container, but rather portioning snacks into smaller bags. Patient's were encouraged to food journal, which helps increase awareness of what and how much they are eating. It was emphasized to patient the importance of reading labels and portion sizes, but also applying these portion sizes. Patient was given a list of items that can help to make portion control easier. For example, a deck of cards or a palm of a hand is a proper portion of meat, a fist is a cup or a proper serving of vegetables. Patient was given 10 tips to help with the portion control. Patient's current diet habits include: Patient's current diet habits include: Patient is eating 3 meals a day. Patient states their portion sizes are regular sie, but states she will switch to smaller plates. I have encouraged patient to use a smaller plate and fill up on water before meals to help cut down on portions. Patient is eating fast food: 1 x a week. Carry out intake is: 1 x a week. Sit down restaurant intake is: 2 x a month. Patient's is eating bread, rice, pasta, cereal, and other carbohydrates states she was eating a lot of bread, but is making positive changes now. Patient is snacking on chips and crackers. This is being done few times a day, but states she is trying to cut back on this. Patient's sweet intake has cut back. She states she was eating more sweets before. I have talked about the importance of getting into the habit now of cutting the sweets out. Fluid intake:  States she drinks a lot of water, crystal light, unsweetened tea. 1-2, 12 ounces of soda per week, 12 ounces of sweet tea a week, sometimes 8 ounces of fruit juice, 12 ounces of caffeine. Patient is encouraged not to drink calories and stick to non-carbonated, non-caffeine, sugar free drinks. The goal is 64 ounces of fluid per day. Physical Activity/Exercise    Comments:     Currently for exercise, patient is doing some walking. Patient was given a list of ideas for activity and was encouraged to incorporate 30 minutes a day into their daily routine. Behavior Modification       Comments: In class, we also focused on the behavior aspects of weight management. This includes being a mindful eater and not eating in front of the TV. Patient is also encouraged to take 20 minutes to eat a meal and eat at a table. Patient states they are currently taking 13-18 minutes to eat a meal.    One goal for next month includes:  1. Change what she should eat  2. Portion control plates.       Lucie Lopez Kobi [address] RD  5/12/2022

## 2022-06-16 ENCOUNTER — DOCUMENTATION ONLY (OUTPATIENT)
Dept: BARIATRICS/WEIGHT MGMT | Age: 37
End: 2022-06-16

## 2022-06-16 ENCOUNTER — HOSPITAL ENCOUNTER (OUTPATIENT)
Dept: BARIATRICS/WEIGHT MGMT | Age: 37
Discharge: HOME | End: 2022-06-16

## 2022-06-16 NOTE — PROGRESS NOTES
[address] [facility], [address]    Patient's Name: Amaury Garcia   Age: 39 y.o. YOB: 1985   Sex: female    Date:   6/16/2022   Insurance:            Session: 2 of 6  Revision:   Surgeon:  Dr. Harry Jacobson    Height: 5 f  Weight:    260      Lbs. BMI:    Pounds Lost since last month: 6               Pounds Gained since last month: 0    Starting Weight: 266   Previous Months Weight: 266  Overall Pounds Lost: 6 Overall Pounds Gained: 0      Do you smoke? None    Alcohol intake:  Number of drinks at a time:  None  Number of times a week: None    Class Guidelines    Guidelines are reviewed with patient at the start of every class. 1. Patient understands that weight loss trial classes must be consecutive. Patient understands if they miss a class, it is their responsibility to contact me to reschedule class. I will reach out to patient after their first no show. 2.  Patient understands the expectations that weight maintenance/weight loss is expected during the classes. Failure to demonstrate changes may result in one extra month of weight loss trial, followed by going back to see the surgeon. Patient understands that they CAN NOT gain any weight during the weight loss trial.  Gaining weight will result in extra classes. 3. Patient is also instructed to be doing their labs, blood work, psych visit, support group and any other test that the surgeon has used while they are working on their weight loss trial.  4.  Patient was instructed to bring their blue binder to every class and appointment. Other Pertinent Information:     Changes Made Since Last Class:     Eating Habits and Behaviors    Today in class, I reviewed a power point that discussed Nutrition, Behavior, and Exercise changes to start working on.   Some of the eating behaviors that we discussed included the importance of eating breakfast.  We talked about some of the reasons that people don't eat breakfast and food choices that would be appropriate. We also talked about avoiding liquid calories. Patient is encouraged to aim for 64 ounces of fluid per day and trying to get them from water, Crystal Light, sugar free drinks. We also talked about eating out options that are healthier. Patient was encouraged to always request sauces and dressings on the side and request a salad, broth-based soup, or vegetables in place of fries. Patient's current diet habits include: Patient's current diet habits include: Patient is eating 3 meals a day. Patient states their portion sizes are small child's size plate. I have encouraged patient to use a smaller plate and fill up on water before meals to help cut down on portions. Patient is eating fast food: 1 x a week. Carry out intake is: 0. Sit down restaurant intake is: 1 x a week. Patient's is eating bread, rice, pasta, cereal, and other carbohydrates 3 x a week. Patient is snacking. This is being done 1 times a day, but did not specify what she is snacking on. Patient's sweet intake is None. I have talked about the importance of getting into the habit now of cutting the sweets out. Fluid intake:  128 ounces of water, crystal light, unsweetened tea.  0 ounces of soda, 0 ounces of sweet tea, 0 ounces of fruit juice, 0 ounces of caffeine. Patient is encouraged not to drink calories and stick to non-carbonated, non-caffeine, sugar free drinks. The goal is 64 ounces of fluid per day. Physical Activity/Exercise    Comments: We talked about exercise. Patient was given reasons of why exercise is so important and how that can help with their long-term success. I have encouraged patient to get a support system to help with the activity. Currently for activity, patient is doing exercising and walking. We have talked about goals for activity to incorporated.       Behavior Modification Comments: We also talked about behavior modifications. We talked about eating triggers, such as eating in front of the TV and solutions, such as making the TV a no eating zone. If patient is eating out out of emotion, food will only temporarily solve that. Patient is encouraged to HALT and assess if they are eating because they are Hungry, or out of emotions: Anxious, Lonely, Tired, which the food will only temporarily solve. We also talked about ways to prevent relapse.     Goals that patient wants to work on includes:  1.   [address] [place] [address] RD  6/16/2022

## 2022-07-21 ENCOUNTER — HOSPITAL ENCOUNTER (OUTPATIENT)
Dept: BARIATRICS/WEIGHT MGMT | Age: 37
Discharge: HOME | End: 2022-07-21

## 2022-07-21 ENCOUNTER — DOCUMENTATION ONLY (OUTPATIENT)
Dept: BARIATRICS/WEIGHT MGMT | Age: 37
End: 2022-07-21

## 2022-07-21 NOTE — PROGRESS NOTES
[address] [place] [address] [facility], [address]    Patient's Name: Carrie Curtis   Age: 40 y.o. YOB: 1985   Sex: female    Date:   7/21/2022    Insurance:              Session: 3 of  6  Revision:     Surgeon:  Dr. Nick Howard    Height: 5f    Weight:    254      Lbs. BMI:    Pounds Lost since last month: 6                 Pounds Gained since last month: 0    Starting Weight: 266     Previous Months Weight: 260  Overall Pounds Lost: 12   Overall Pounds Gained: 0      Do you smoke? None    Alcohol intake:  Number of drinks at a time:  None  Number of times a week: None    Patient has been to a support group. Class Guidelines    Guidelines are reviewed with patient at the start of every class. 1. Patient understands that weight loss trial classes must be consecutive. Patient understands if they miss a class, it is their responsibility to contact me to reschedule class. I will reach out to patient after their first no show. 2.  Patient understands the expectations that weight maintenance/weight loss is expected during the classes. Failure to demonstrate changes may result in one extra month of weight loss trial, followed by going back to see the surgeon. Patient understands that they CAN NOT gain any weight during the weight loss trial.  Gaining weight will result in extra classes. 3. Patient is also instructed to be doing their labs, blood work, psych visit, support group and any other test that the surgeon has used while they are working on their weight loss trial.  4.  Patient was instructed to bring their blue binder to every class and appointment. Other Pertinent Information:     Changes Made Since Last Class:     Eating Habits and Behaviors    Today in class, we started talking about the key diet principles. We first focused on stopping liquid calories. Patient was also educated on carbohydrates.   Patient was instructed to start cutting out bread, rice, and pasta from the diet and start focusing more on meat and vegetables. I then gave a power point, which focused on Label Reading. In class, I gave patients a labels and we worked through a series of questions to help patients have a better understanding of label reading. Patient was instructed to review the serving size. Patient was encouraged to focus on protein and carbohydrates. We also did a few label reading activities to help the patient become more familiar with label reading. Patient's current diet habits include: Patient's current diet habits include: Patient is eating 3 meals a day. Patient states their portion sizes are smaller portions. I have encouraged patient to use a smaller plate and fill up on water before meals to help cut down on portions. Patient is eating fast food: 1 x a week. Carry out intake is: 2 x a week. Sit down restaurant intake is: 1 x a week. Patient's is eating bread, rice, pasta, cereal, and other carbohydrates every other week. Patient is snacking on nuts, granola bars, fruit. This is being done 2 times a day. Patient's sweet intake is sherbert, ice cream.  I have talked about the importance of getting into the habit now of cutting the sweets out. Fluid intake:  64 ounces of water, crystal light, unsweetened tea.  0 ounces of soda, 0 ounces of sweet tea, 0 ounces of fruit juice, 8 ounces of caffeine. Patient is encouraged not to drink calories and stick to non-carbonated, non-caffeine, sugar free drinks. The goal is 64 ounces of fluid per day. Physical Activity/Exercise    Comments: We talked about exercise. Patient was given reasons of why exercise is so important and how that can help with their long-term success. I have encouraged patient to get a support system to help with the activity. Currently for activity, patient is doing walking and going to the [facility].       Behavior Modification       Comments:  Behavior modifications were reinforced. This included not eating in front of the TV, which could lead to bigger portions and eating when one is not hungry. We also talked about the importance of eating 3 meals per day. Patient was encouraged to food journal to keep their daily carbohydrates less than 30 grams per meal.      Goals that patient wants to work on includes:  1. Continue with exercise routine.   [address], [place] RD  7/21/2022

## 2022-07-22 VITALS
WEIGHT: 254 LBS | SYSTOLIC BLOOD PRESSURE: 154 MMHG | RESPIRATION RATE: 18 BRPM | DIASTOLIC BLOOD PRESSURE: 100 MMHG | TEMPERATURE: 97.6 F | OXYGEN SATURATION: 98 % | BODY MASS INDEX: 49.87 KG/M2 | HEIGHT: 60 IN | HEART RATE: 94 BPM

## 2022-07-22 LAB
APPEARANCE UR: CLEAR
BILIRUB UR QL: NEGATIVE
COLOR UR: YELLOW
GLUCOSE UR STRIP.AUTO-MCNC: NEGATIVE MG/DL
HGB UR QL STRIP: NEGATIVE
KETONES UR QL STRIP.AUTO: NEGATIVE MG/DL
LEUKOCYTE ESTERASE UR QL STRIP.AUTO: NEGATIVE
NITRITE UR QL STRIP.AUTO: NEGATIVE
PH UR STRIP: 6.5 [PH] (ref 5–8)
PROT UR STRIP-MCNC: NEGATIVE MG/DL
SP GR UR REFRACTOMETRY: 1.02 (ref 1–1.03)
UROBILINOGEN UR QL STRIP.AUTO: 1 EU/DL (ref 0.2–1)

## 2022-07-22 PROCEDURE — 81003 URINALYSIS AUTO W/O SCOPE: CPT

## 2022-07-22 PROCEDURE — 75810000275 HC EMERGENCY DEPT VISIT NO LEVEL OF CARE

## 2022-07-23 ENCOUNTER — HOSPITAL ENCOUNTER (EMERGENCY)
Age: 37
Discharge: LEFT BEFORE DISCHARGE | End: 2022-07-23
Payer: COMMERCIAL

## 2022-07-23 NOTE — ED TRIAGE NOTES
A&O female with c/o right flank pain x 2 days. Reports as intermittent pain. No relief from Motrin. Denies urinary symptoms. Denies n/v/d. Denies vaginal discharge/bleeding.

## 2022-07-27 ENCOUNTER — OFFICE VISIT (OUTPATIENT)
Dept: SURGERY | Age: 37
End: 2022-07-27
Payer: COMMERCIAL

## 2022-07-27 VITALS
WEIGHT: 260 LBS | TEMPERATURE: 97.3 F | SYSTOLIC BLOOD PRESSURE: 150 MMHG | HEIGHT: 60 IN | RESPIRATION RATE: 16 BRPM | BODY MASS INDEX: 51.04 KG/M2 | DIASTOLIC BLOOD PRESSURE: 96 MMHG | HEART RATE: 93 BPM | OXYGEN SATURATION: 99 %

## 2022-07-27 DIAGNOSIS — Z71.89 ENCOUNTER FOR PRE-BARIATRIC SURGERY COUNSELING AND EDUCATION: Primary | ICD-10-CM

## 2022-07-27 DIAGNOSIS — E66.01 MORBID OBESITY (HCC): ICD-10-CM

## 2022-07-27 PROCEDURE — 99213 OFFICE O/P EST LOW 20 MIN: CPT | Performed by: NURSE PRACTITIONER

## 2022-07-27 NOTE — PROGRESS NOTES
Bariatric Preoperative Progress Note    Subjective:     Jaqueline Mike is a 40 y.o. female who presents today for followup of their candidacy for bariatric surgery. Since last seen, Jaqueline Mike has been working through our bariatric program towards gastric bypass with Dr. Duane Caceres. Consult Weight: 36  Today's Weight: 260    Past Medical History:   Diagnosis Date    Adverse effect of anesthesia     after c sections, pt get very cold and has trembles/shakes     Anemia     HTN (hypertension)        Past Surgical History:   Procedure Laterality Date    HX  SECTION      x4    HX GYN      tubal ligation,ablation       Current Outpatient Medications   Medication Sig Dispense Refill    ibuprofen (MOTRIN) 600 mg tablet Take 1 Tab by mouth every six (6) hours as needed for Pain. 60 Tab 1    ferrous sulfate 325 mg (65 mg iron) tablet Take  by mouth Daily (before breakfast). docusate sodium (COLACE) 100 mg capsule Take 100 mg by mouth two (2) times a day. hydroCHLOROthiazide (HYDRODIURIL) 25 mg tablet Take 25 mg by mouth daily. atorvastatin (LIPITOR) 20 mg tablet Take 20 mg by mouth daily. amLODIPine (NORVASC) 5 mg tablet Take 5 mg by mouth daily. cyanocobalamin (VITAMIN B12) 100 mcg tablet Take 100 mcg by mouth daily. OTHER Iron infusions   Once a month  Last infusion was 2020      multivitamin (ONE A DAY) tablet Take 1 Tab by mouth daily. No Known Allergies    ROS:  Review of Systems   Constitutional:  Positive for weight loss. Negative for malaise/fatigue. Respiratory: Negative. Cardiovascular: Negative. Gastrointestinal: Negative. Skin: Negative. Neurological: Negative. Objective:     Physical Exam:  Visit Vitals  BP (!) 150/96   Pulse 93   Temp 97.3 °F (36.3 °C) (Temporal)   Resp 16   Ht 5' (1.524 m)   Wt 117.9 kg (260 lb)   SpO2 99%   BMI 50.78 kg/m²       Physical Exam  Vitals and nursing note reviewed.    Constitutional:       Appearance: She is obese.   HENT:      Head: Normocephalic and atraumatic. Pulmonary:      Effort: Pulmonary effort is normal.   Musculoskeletal:         General: Normal range of motion. Neurological:      General: No focal deficit present. Mental Status: She is alert and oriented to person, place, and time. Psychiatric:         Mood and Affect: Mood normal.         Behavior: Behavior normal.       Studies to date:    Labs: Pt reports she had completed with PCP's office, will request to fax over  H pylori negative    EKG: Not completed    CXR: Not completed    Nutritional evaluation: Completed 3 of 6    Psychiatric evaluation: Approved by Dr. Granda on 6/7/2022    Support Group: Yes    Pap Smear: Pt will have faxed over from PCP office    Mammogram: Pt <40 with no family hx of breast cancer    Assessment:   Karina Liriano is a 40 y.o. female who is progressing through the bariatric preoperative evaluation. At this time, she is an appropriate candidate for weight loss surgery pending below requirements. Plan:   -Complete remainder of preop evaluation including remainder of WLT classes, labs, EKG, CXR, and pap results.   -Patient communicates understanding that the expectation is to lose or maintain weight during WLT. Weight gain may result in delay or cancellation of surgery.   -Follow up once has completed entirety of weight loss workup to determine next steps. Kota Ortega NP    Ms. Olvin Roberson has been given the following recommendations today due to her elevated BP reading: Patient has been advised to follow up with her PCP.

## 2022-08-18 ENCOUNTER — HOSPITAL ENCOUNTER (OUTPATIENT)
Dept: BARIATRICS/WEIGHT MGMT | Age: 37
Discharge: HOME | End: 2022-08-18

## 2022-08-19 ENCOUNTER — DOCUMENTATION ONLY (OUTPATIENT)
Dept: BARIATRICS/WEIGHT MGMT | Age: 37
End: 2022-08-19

## 2022-08-19 NOTE — PROGRESS NOTES
[address] [facility], [address]    Patient's Name: Jim Gupta   Age: 40 y.o. YOB: 1985   Sex: female        Insurance:              Session: 4 of  6  Surgeon:  Dr. Nathalie Casper    Height: 5 f    Current Weight:    258      Lbs. BMI:    Pounds Lost since last month: 0                 Pounds Gained since last month: 4      Starting Weight: 266     Previous Months Weight: 254  Overall Pounds Lost: 8   Overall Pounds Gained: 0    Do you smoke? None    Alcohol intake:  Number of drinks at a time:  None  Number of times a week: NOne    Class Guidelines    Guidelines are reviewed with patient at the start of every class. 1. Patient understands that weight loss trial classes must be consecutive. Patient understands if they miss a class, it is their responsibility to contact me to reschedule class. I will reach out to patient after their first no show. 2.  Patient understands the expectations that weight maintenance/weight loss is expected during the classes. Failure to demonstrate changes may result in one extra month of weight loss trial, followed by going back to see the surgeon. Patient understands that they CAN NOT gain any weight during the weight loss trial.  Gaining weight will result in extra classes. 3. Patient is also instructed to be doing their labs, blood work, psych visit, support group and any other test that the surgeon has used while they are working on their weight loss trial.  4.  Patient was instructed to bring their blue binder to every class and appointment. Other Pertinent Information:     Changes Made Since Last Class:     Eating Habits and Behaviors    Today in class, we reviewed the key diet principles. I have talked to patient about pushing the fluid and working towards 64 ounces per day. We focused on following a low-calorie diet.   Patient was instructed to count their carbohydrates and try to keep their daily intake under 75 grams per day and try to keep their daily protein at 80 grams per day. Patient was given examples of carbohydrates in starches. Patient was encouraged to focus on meat and vegetables and begin cutting carbohydrates out. We talked about foods that are protein-based and how to incorporate those into their meals. I also reviewed with patient the importance of eating 3 meals per day and suggestions were made for breakfast items. Patient's current diet habits include: Patient is eating 3-4 meals per day. Patient is encouraged to fill up on protein first, then vegetables, and work on cutting back on the carbohydrates. Portions are: smalelr plates. Patient is encouraged to use a smaller plate to help cut down on portion sizes. Patient is eating fast food: 1 times a week. Patient is eating carry out: 1 times a every few week. Patient is eating at a sit down restaurant: 1 times a month. Patient is eating bread, rice, and pasta:  2 x a week. Patient is snacking on fruit, applesauce, crackers, nuts. This is being done daily. Patient is eating sweets: sometimes. Patient is drinking 72 ounces of water, 0 ounces of soda, 0 ounces of sweet tea, 8 ounces of fruit juices, 8 ounces of caffeine. Patient is encouraged to continue to cut out liquid calories and aim for achieving 64 ounces of fluid per day. Physical Activity/Exercise    Comments: We talked about exercise. Patient was given reasons of why exercise is so important and how that can help with their long-term success. I have encouraged patient to get a support system to help with the activity. For activity, patient is walking more. We have talked about goals, which include starting off walking and building upon that. Patient is also encouraged to add in some light weights to get some strength training.      Behavior Modification       Comments:  During today's lesson, I gave a presentation called The 100-200 Calorie \"Mindless Margin. \"  The goal is to make modest daily 100-200 calorie reductions in certain things that the body won't notice. One, 100-200 calorie change and would will look 10-20 pounds in one year. An example could be cutting soda. Patient was given a check off list and was encouraged to come up with 1-3 100 calories changes they could make. The check off list is a daily tracker to see if these goals are being met. Goals that patient wants to work on includes:  1. Get back on track.   [address], [place] RD  8/19/2022

## 2022-09-16 ENCOUNTER — HOSPITAL ENCOUNTER (OUTPATIENT)
Dept: BARIATRICS/WEIGHT MGMT | Age: 37
Discharge: HOME | End: 2022-09-16

## 2022-09-16 ENCOUNTER — DOCUMENTATION ONLY (OUTPATIENT)
Dept: BARIATRICS/WEIGHT MGMT | Age: 37
End: 2022-09-16

## 2022-09-16 NOTE — PROGRESS NOTES
[address] [place] [address] [facility], [address]    Patient's Name: Sheila Gonzales   Age: 40 y.o. YOB: 1985   Sex: female    Date:   9/16/2022    Insurance:              Session: 5 of  6  Surgeon:  Dr. Carmela Goodson    Height: 5 f    Weight:    258      Lbs. BMI:    Pounds Lost since last month: 0                Pounds Gained since last month: 0    Starting Weight: 266     Previous Months Weight: 258  Overall Pounds Lost: 8   Overall Pounds Gained: 0    Smoking:  None    Alcohol intake:  Number of drinks at a time:  NOne  Number of times a week: NOne    Patient has attended the required bariatric support group. Class Guidelines    Guidelines are reviewed with patient at the start of every class. 1. Patient understands that weight loss trial classes must be consecutive. Patient understands if they miss a class, it is their responsibility to contact me to reschedule class. I will reach out to patient after their first no show. 2.  Patient understands the expectations that weight maintenance/weight loss is expected during the classes. Failure to demonstrate changes may result in one extra month of weight loss trial, followed by going back to see the surgeon. 3. Patient is also instructed to be doing their labs, blood work, psych visit, support group and any other test that the surgeon has used while they are working on their weight loss trial.    Other Pertinent Information:     Changes Made Since Last Class: walking more and no snacks    Eating Habits and Behaviors      During today's class, we continued to focus on the key diet principles. Patient was instructed to follow a low carbohydrate diet, focusing on meat and vegetables. Patient was instructed to stop liquid calories and aim for 64 ounces of water per day.  We focused on focusing in on bigger problem areas to start making changes to, such as reducing fast food intake, reducing carbonated beverages/soda intake, decreasing carbohydrates intake daily, etc. We reviewed protein shakes and high protein yogurts to chose, as well. During today's class, we also talked about how to read a label. Patient was given information on: The benefits of reading a label, which allowed one to compare the nutritional value of similar products and make healthy food decisions. The ingredient list, which can help to determine if the food is heathy or something that fits into the diet. The importance of reading the serving size and making sure to apply that to the portion size that they are consuming. Patient was also educated on carbohydrates. I talked to patient about: The function of carbohydrates. Foods that carbohydrate-heavy. Patient was given the guidelines to keep their carbohydrates less than 75 grams per day in the pre-op phase. Patient was also given ideas of low carb swaps, which include zucchini noodles, spaghetti squash, or cauliflower rice. Lower carbohydrate fruit options were discussed. Discussed lower carb swaps to use instead of potato chips. Patient's dietary habits include: Patient is eating 3 meals per day. Meals are made up of protein and vegetables. Portions are:  smaller plate now. Patient's frequency of fast food is: 1 x a week  Patient's frequency of carry out is: 1 x carry out  Patient's intake of sit down: None  Patient is eating carbohydrates: 2 x a week  Patient is snacking on fruit, but trying not to snack. This is being done: . I have talked to patient about some lower carbohydrate snack choices that focused more protein. Patient is drinking 64 ounces of fluid per day. Fluid intake is make up of: water and some fruit juices, but is working on cutting these out. Patient is encouraged to focus on non-caloric, non-caffeine, non-carbonated liquids.         Physical Activity/Exercise     Comments:     Currently for exercise, patient walking a few times a week. I have talked to patient about some suggestions to start an exercise routine. Patient is encouraged to purchase a pedometer and use this to track her steps. I have made some suggestions to patient of ways to incorporate exercise in with a busy lifestyle. We also talked about You Tube videos that can be used for an exercise routine. Behavior Modification  Comments:  Behavior modifications were discussed with the patient. Some of those behavior modifications include:  Emphasized the importance of eating slowly, not eating and drinking meals at the same time. Taking 20-30 minutes to eat a meal.  Patient is taking 20 minutes to eat a meal.  I have encouraged patient to follow journal, which may be done by paper or tracking it an mary, such as My Fitness Pal or [employer] Ratings. [address]. Patient understands the importance of following through with these behaviors following surgery to aid in long term weight loss. Tips and advice were given on how to start implementing these into the patient's life. Goal patient has set for next month:  Try protein water.   [address], [facility] [address]  9/16/2022

## 2022-10-25 ENCOUNTER — DOCUMENTATION ONLY (OUTPATIENT)
Dept: BARIATRICS/WEIGHT MGMT | Age: 37
End: 2022-10-25

## 2022-10-25 ENCOUNTER — HOSPITAL ENCOUNTER (OUTPATIENT)
Dept: BARIATRICS/WEIGHT MGMT | Age: 37
Discharge: HOME | End: 2022-10-25

## 2022-10-25 NOTE — PROGRESS NOTES
Nutrition Evaluation    Patient's Name: Jessica Baumann   Age: 40 y.o. YOB: 1985   Sex: female    Height: 5 f Weight: 258 BMI:    Starting Weight:  266        Smoking Status:  None  Alcohol Intake:  Number of Drinks at a Time: None  Number of Times a Week: None    Changes made during classes include:  No soda  More walking  No snacking    Summary:  I feel that Jessica Baumann has demonstrated appropriate diet changes and is ready to move forward with surgery. Patient has been briefed on the importance of the protein drinks, vitamins, and the transition of the diet stages. Patient understands that the long-term diet will focus on protein and vegetables. Patient understand the effects of carbohydrates after surgery and what reactive hypoglycemia is. Patient is aware that they will be attending pre-op class 2 weeks before surgery and will get more detailed information on the post-op diet guidelines. Patient will see me again at 6 weeks post-op. At this 6 week visit, RD will assess how patient is tolerating soft protein and advance to vegetables, if tolerating soft protein without difficulty. Patient will also see RD again at 9 months post-op. This visit will assess patient's compliance with current protocol, including diet, vitamins, protein shakes, and exercise. Post-op diet guidelines will be reinforced. RD is available for questions and to meet with patient outside of the 6 week and 9 month post-op visit. We spent a lot of time talking about the vitamins. Patient understands the importance of being compliant with the diet protocol and the complications and risks that can occur if they are non-compliant with the nutritional protocol. Patient has attended at least one support group.     Candidate for surgery: Yes  Re-evaluation Date:     Procedure:  Gastric Bypass    Lucie Mckinney 87 RD  10/25/2022

## 2022-10-25 NOTE — PROGRESS NOTES
[address] [facility], [address]    Patient's Name: Cristina Kitchen   Age: 40 y.o. YOB: 1985   Sex: female      Insurance:              Session: 6 of 6  Surgeon:  Dr. KHRIS RODRIGUEZ [facility]    Height: 5 f    Weight:    258      Lbs. BMI:    Pounds Lost since last month: 0                 Pounds Gained since last month: 0    Starting Weight: 266     Previous Months Weight: 258  Overall Pounds Lost: 8   Overall Pounds Gained: 0    Patient has attended a support group meeting. Do you smoke? None    Alcohol intake:  Number of drinks at a time:  None  Number of times a week: None    Class Guidelines    Guidelines are reviewed with patient at the start of every class. 1. Patient understands that weight loss trial classes must be consecutive. Patient understands if they miss a class, it is their responsibility to contact me to reschedule class. I will reach out to patient after their first no show. 2.  Patient understands the expectations that weight maintenance/weight loss is expected during the classes. Failure to demonstrate changes may result in one extra month of weight loss trial, followed by going back to see the surgeon. 3. Patient is also instructed to be doing their labs, blood work, psych visit, support group and any other test that the surgeon has used while they are working on their weight loss trial.  4. Patient is instructed to bring their education binder to all classes. Changes Made Since Last Class: No fried food    Eating Habits and Behaviors      Today we reviewed key diet principles. We talked about patient following a low calorie/low carbohydrate diet while they are in weight loss trials. To achieve this, patient is encouraged to avoid liquid calories, including alcohol, soda, sweet tea, and fruit juices. Patient can cut carbohydrates by trying to stick to meat and vegetables.   Patient can also eat eggs, cheese, and good fat, while trying to eliminate starches, such as pasta, rice, crackers, chips, popcorn. I also gave a power point that included 21 Ways to Stay on Track with a Healthy Lifestyle. Some of the food-related suggestions included drinking plenty of water or calorie-free beverages prior to their meal.  Patient is encouraged to to fill up on protein first, which is the ultimate fill-me up food. We talked about the importance of eating breakfast and the effects that can happen if one skips meals, which includes eating larger portions, snacking more, and decreasing their metabolism. With the suggestions in the power point, patient will be able to decrease their calories and carbohydrate intake. Patient's dietary habits include:    - Patient is eating 3 meals per day. I have emphasized the importance of trying to eat within 1 hour of waking and having a cut off time in the evening. Addressed to patient that the focus should be on protein and vegetables. Protein will help to burn more calories and keep them fabian throughout the day. We talked in class about the importance of planning ahead and trying to do more meal prep at home, so intake of eating out can be decreased. Patient is eating carbohydrates: Cutting these out  Patient was instructed to cut out starches and keep under 75 grams of carbohydrates per day. Reviewed what portions of carbohydrates are  Patient is snacking on fruit, nuts. This is being done: not a lot. I have talked to patient about some lower carbohydrate snack choices that focused more protein. Patient's sweet intake is: 2 x this past month. We talked about label reading and cutting out simple sugar. Fluid intake is make up of: water, sweet tea once in a while. I have encouraged patient to cut out liquid calories and focus on non-caloric, non-carbonated drinks. Physical Activity/Exercise    Comments:    We talked about the importance of establishing a work out routine. Patient is currently going to the gym for activity. Goals have been set. Behavior Modification       Comments:   Some of the behavior tips that were included in the power point, include being choosy about night time snacking. Patient was encouraged to make the TV a no eating zone and not eat after 7 pm.  Patient is also encouraged to keep a food journal.      One of the other things we talked about during class is whether or not patient has a support system.         Goals set by Registered Dietitian:  A diet plan after operation    Quillian Gosselin, [place] [address] October 20, 2022

## 2022-10-28 ENCOUNTER — HOSPITAL ENCOUNTER (OUTPATIENT)
Dept: PREADMISSION TESTING | Age: 37
Discharge: HOME | End: 2022-10-28
Payer: COMMERCIAL

## 2022-10-28 ENCOUNTER — HOSPITAL ENCOUNTER (OUTPATIENT)
Dept: GENERAL RADIOLOGY | Age: 37
Discharge: HOME | End: 2022-10-28
Payer: COMMERCIAL

## 2022-10-28 DIAGNOSIS — E66.01 MORBID OBESITY WITH BMI OF 50.0-59.9, ADULT (HCC): ICD-10-CM

## 2022-10-28 LAB
25(OH)D3 SERPL-MCNC: 12.3 NG/ML (ref 30–100)
ALBUMIN SERPL-MCNC: 3.7 G/DL (ref 3.4–5)
ALBUMIN/GLOB SERPL: 0.8 {RATIO} (ref 0.8–1.7)
ALP SERPL-CCNC: 101 U/L (ref 45–117)
ALT SERPL-CCNC: 20 U/L (ref 13–56)
ANION GAP SERPL CALC-SCNC: 7 MMOL/L (ref 3–18)
APPEARANCE UR: CLEAR
AST SERPL-CCNC: 14 U/L (ref 10–38)
ATRIAL RATE: 78 BPM
BILIRUB SERPL-MCNC: 0.3 MG/DL (ref 0.2–1)
BILIRUB UR QL: NEGATIVE
BUN SERPL-MCNC: 12 MG/DL (ref 7–18)
BUN/CREAT SERPL: 15 (ref 12–20)
CALCIUM SERPL-MCNC: 9.6 MG/DL (ref 8.5–10.1)
CALCULATED P AXIS, ECG09: 42 DEGREES
CALCULATED R AXIS, ECG10: 57 DEGREES
CALCULATED T AXIS, ECG11: 31 DEGREES
CHLORIDE SERPL-SCNC: 102 MMOL/L (ref 100–111)
CHOLEST SERPL-MCNC: 200 MG/DL
CO2 SERPL-SCNC: 29 MMOL/L (ref 21–32)
COLOR UR: YELLOW
CREAT SERPL-MCNC: 0.78 MG/DL (ref 0.6–1.3)
DIAGNOSIS, 93000: NORMAL
ERYTHROCYTE [DISTWIDTH] IN BLOOD BY AUTOMATED COUNT: 14.3 % (ref 11.6–14.5)
EST. AVERAGE GLUCOSE BLD GHB EST-MCNC: 137 MG/DL
FERRITIN SERPL-MCNC: 41 NG/ML (ref 8–388)
FOLATE SERPL-MCNC: 9.6 NG/ML (ref 3.1–17.5)
GLOBULIN SER CALC-MCNC: 4.5 G/DL (ref 2–4)
GLUCOSE SERPL-MCNC: 83 MG/DL (ref 74–99)
GLUCOSE UR STRIP.AUTO-MCNC: NEGATIVE MG/DL
HBA1C MFR BLD: 6.4 % (ref 4.2–5.6)
HCT VFR BLD AUTO: 36.9 % (ref 35–45)
HDLC SERPL-MCNC: 39 MG/DL (ref 40–60)
HDLC SERPL: 5.1 {RATIO} (ref 0–5)
HGB BLD-MCNC: 11.8 G/DL (ref 12–16)
HGB UR QL STRIP: NEGATIVE
IRON SERPL-MCNC: 22 UG/DL (ref 50–175)
KETONES UR QL STRIP.AUTO: NEGATIVE MG/DL
LDLC SERPL CALC-MCNC: 149.8 MG/DL (ref 0–100)
LEUKOCYTE ESTERASE UR QL STRIP.AUTO: NEGATIVE
LIPID PROFILE,FLP: ABNORMAL
MCH RBC QN AUTO: 26.4 PG (ref 24–34)
MCHC RBC AUTO-ENTMCNC: 32 G/DL (ref 31–37)
MCV RBC AUTO: 82.6 FL (ref 78–100)
NITRITE UR QL STRIP.AUTO: NEGATIVE
NRBC # BLD: 0 K/UL (ref 0–0.01)
NRBC BLD-RTO: 0 PER 100 WBC
P-R INTERVAL, ECG05: 170 MS
PH UR STRIP: 6.5 [PH] (ref 5–8)
PLATELET # BLD AUTO: 489 K/UL (ref 135–420)
PMV BLD AUTO: 9.2 FL (ref 9.2–11.8)
POTASSIUM SERPL-SCNC: 4.2 MMOL/L (ref 3.5–5.5)
PROT SERPL-MCNC: 8.2 G/DL (ref 6.4–8.2)
PROT UR STRIP-MCNC: NEGATIVE MG/DL
Q-T INTERVAL, ECG07: 366 MS
QRS DURATION, ECG06: 78 MS
QTC CALCULATION (BEZET), ECG08: 417 MS
RBC # BLD AUTO: 4.47 M/UL (ref 4.2–5.3)
SODIUM SERPL-SCNC: 138 MMOL/L (ref 136–145)
SP GR UR REFRACTOMETRY: 1.02 (ref 1–1.03)
TRIGL SERPL-MCNC: 56 MG/DL (ref ?–150)
TSH SERPL DL<=0.05 MIU/L-ACNC: 2.06 UIU/ML (ref 0.36–3.74)
UROBILINOGEN UR QL STRIP.AUTO: 1 EU/DL (ref 0.2–1)
VENTRICULAR RATE, ECG03: 78 BPM
VIT B12 SERPL-MCNC: 1241 PG/ML (ref 211–911)
VLDLC SERPL CALC-MCNC: 11.2 MG/DL
WBC # BLD AUTO: 5.9 K/UL (ref 4.6–13.2)

## 2022-10-28 PROCEDURE — 82306 VITAMIN D 25 HYDROXY: CPT

## 2022-10-28 PROCEDURE — 82728 ASSAY OF FERRITIN: CPT

## 2022-10-28 PROCEDURE — 82607 VITAMIN B-12: CPT

## 2022-10-28 PROCEDURE — 83540 ASSAY OF IRON: CPT

## 2022-10-28 PROCEDURE — 80053 COMPREHEN METABOLIC PANEL: CPT

## 2022-10-28 PROCEDURE — 85027 COMPLETE CBC AUTOMATED: CPT

## 2022-10-28 PROCEDURE — 81003 URINALYSIS AUTO W/O SCOPE: CPT

## 2022-10-28 PROCEDURE — 84425 ASSAY OF VITAMIN B-1: CPT

## 2022-10-28 PROCEDURE — 93005 ELECTROCARDIOGRAM TRACING: CPT

## 2022-10-28 PROCEDURE — 71046 X-RAY EXAM CHEST 2 VIEWS: CPT

## 2022-10-28 PROCEDURE — 84443 ASSAY THYROID STIM HORMONE: CPT

## 2022-10-28 PROCEDURE — 80061 LIPID PANEL: CPT

## 2022-10-28 PROCEDURE — 36415 COLL VENOUS BLD VENIPUNCTURE: CPT

## 2022-10-28 PROCEDURE — 83036 HEMOGLOBIN GLYCOSYLATED A1C: CPT

## 2022-11-01 LAB — VIT B1 BLD-SCNC: 80.7 NMOL/L (ref 66.5–200)

## 2022-11-14 ENCOUNTER — DOCUMENTATION ONLY (OUTPATIENT)
Dept: BARIATRICS/WEIGHT MGMT | Age: 37
End: 2022-11-14

## 2022-11-14 NOTE — PROGRESS NOTES
CLINICAL NUTRITION PRE-OPERATIVE EDUCATION    Patient's Name: Haim Fernandez   Age: 40 y.o. YOB: 1985   Sex: female    Education & Materials Provided:   - Soft Protein Diet Shopping List  -  Supplemental Resource Guide: MVI, B12, Calcium Citrate, Vitamin D, Vitamin B1,   and iron recommendations  - Protein Supplement Information  - Fluid Requirements/ No Straws  - No Caffeine or Carbonation   - No alcohol              - No Snacks or No Concentrated Sweets     - Exercising   - Support System at [facility] of Support Group meetings. Support System after surgery includes: x     - Key Diet Principles            - Addressed Current Habits/Changes to Make   - Patient has been educated on the liquid diet to begin 1 week prior to surgery. Patient understands the transition of the diet. Attendance of support group: Yes    Summary:  Patient has completed the required amount of visits with the Registered Dietitian. During these nutrition visits, we focused on dietary changes, behavior changes, and the importance of establishing an exercise routine. The diet protocol that patient was prescribed emphasized on low carbohydrates (less than 100 grams per day prior to surgery and 60-80 grams of protein per day). At today's session, patient was educated on the post-op diet protocol. Patient understands the importance of keeping total fat and sugar less than 3 grams per serving. Patient is aware of the transition of the diet stages and is aware that they will be on clear liquids for 7days, followed by soft protein for 5 weeks. Patient understands the body needs ~ 60-70 grams of protein per day. During the liquid phase, patient will need 60 grams of protein from shakes. Once eating soft protein, patient will only need 1 shake per day. Patient is aware of the importance of the vitamins and protein drinks. We spent a lot of time talking about the vitamins.   Patient understands the importance of being compliant with the diet protocol and the complications and risks that can occur if they are non-compliant with the nutritional protocol and non-compliant with the vitamins. Patient has also been educated on the pre-op liquid diet for 1 week. Patient understands that failure to comply in pre-op liquid diet could result in surgery being canceled. Patient's 6 week post-op nutrition appointment has been scheduled. At this 6 week visit, RD will assess how patient is tolerating soft protein and advance to vegetables, if tolerating soft protein without difficulty. Patient will also see RD again at 9 months post-op. This visit will assess patient's compliance with current protocol, including diet, vitamins, protein shakes, and exercise. Post-op diet guidelines will be reinforced. RD is available for questions and to meet with patient outside of the 6 week and 9 month post-op visit. Ok to proceed.      Candidate for surgery: Yes  Re-evaluation Date:     Lucie Mckinney 87 RD  11/14/2022

## 2022-11-15 ENCOUNTER — HOSPITAL ENCOUNTER (OUTPATIENT)
Dept: BARIATRICS/WEIGHT MGMT | Age: 37
Discharge: HOME | End: 2022-11-15

## 2022-11-15 ENCOUNTER — TELEPHONE (OUTPATIENT)
Dept: BARIATRICS/WEIGHT MGMT | Age: 37
End: 2022-11-15

## 2022-11-15 NOTE — TELEPHONE ENCOUNTER
Gastric Bypass Instruct patient to read and understand how their surgery works. The laparoscopic Melyssa-en-Y Gastric Bypass -- often called gastric  bypass -- is considered the gold standard of weight loss surgery. The procedure: The gastric bypass is one of the most frequently performed weight  loss procedures in the [place]. In this procedure, stapling  creates a small (15 to 20 milliliters) stomach pouch. The remainder  of the stomach is not removed but is completely stapled shut and divided from the stomach  pouch. The outlet from this newly formed pouch empties directly into the lower portion of the  small intestine, thus bypassing calorie absorption. This is done by dividing the small intestine just  beyond the duodenum for the purpose of bringing it up and constructing a connection with the  newly formed stomach pouch. The other end is connected into the side of the Melyssa limb of the  intestine creating the Y shape that gives the technique its name. The length of either segment of  the intestine can be increased to produce lower or higher levels of malabsorption. Most importantly, the rerouting of the food stream produces changes in gut hormones that  promote feeling of fullness, suppress hunger, and reverse one of the primary mechanisms by which  obesity induces Type 2 diabetes. Advantages: The average excess weight loss after the gastric bypass procedure is generally higher in a  compliant patient than with purely restrictive procedures. One year after surgery, weight loss can average 60 to 80 percent of excess body weight. Studies show that after 10 to 14 years, 50 to 60 percent of excess body weight loss has been  maintained by some patients. A 2000 study of 500 patients showed that 96 percent of associated health conditions (back  pain, sleep apnea, high blood pressure, diabetes and depression) were improved or resolved.   Disadvantages:   Because the duodenum is bypassed, poor absorption of iron and calcium can result in the  lowering of total body iron and a predisposition to iron deficiency anemia. A chronic anemia caused by vitamin B-12 deficiency may occur. This problem usually can be  prevented with vitamin B-12 pills under the tongue or injections. In some cases, the effectiveness of the procedure may be reduced if the stomach pouch is  stretched and/or if it is initially left larger than 15 to 30 cc. The bypassed portion of the stomach, duodenum and segments of the small intestine cannot  be easily visualized using X-ray or endoscopy if there are any problems, such as ulcers,  bleeding or malignancy. Sleeve Gastrectomy  The laparoscopic sleeve gastrectomy -- often called the  sleeve -- is performed by removing approximately 80 percent  of the stomach. The remaining stomach is a tubular pouch that  resembles a banana. The procedure:  During the sleeve gastrectomy procedure, a thin, vertical sleeve  of stomach is created by using a stapling device. The sleeve is about the size of a banana. The rest  of the stomach is removed. By creating a smaller stomach pouch, a sleeve gastrectomy limits the  amount of food that can be eaten at one time so you feel full sooner and stay full longer. As you  eat less food, your body will stop storing excess calories and start using its fat supply for energy. The greater impact, however, seems to be the effect the surgery has on gut hormones that impact  a number of factors including hunger, feeling of fullness, and blood sugar control. Advantages:   Eliminates the portion of the stomach that produces the hormones that stimulate hunger. Minimizes the chance of an ulcer occurring. Is an appealing option for people who are concerned about the complications of intestinal  bypass procedures or who have anemia, Crohns disease or various other conditions that make  intestinal bypass procedures too risky.   -- 13 --  PATIENT GUIDE TO BARIATRIC [facility]  Disadvantages:   Potential for inadequate weight loss or weight regain. While this is true for all procedures, it is  more possible with procedures that do not have an intestinal bypass. Higher BMI patients may need to have a second-stage procedure later to help lose additional  weight. Remember, two stages may ultimately be safer and more effective than one operation  for higher BMI patients. This procedure does involve stomach stapling and therefore, leaks and other complications  related to stapling may occur. This procedure is not reversible. Pre op Appoitments  PE LOCATION PROVIDER  2 Weeks  6-Week Nutrition  3 Months*  6 Months*  1 Year*  Patient Acknowledgement of Risks, Benefits,  and Alternatives to Bariatric Surgical Procedures  Patient Name:_________________________________________________________________  :_ _______________________________________________________________________  I am requesting bariatric surgery be performed on me. I believe I may benefit from bariatric  surgery. This form is designed to ensure that I understand the risks, benefits, and alternatives to  having bariatric surgery. Bariatric surgery, or surgery for morbid obesity, is major surgery. The  options available include restrictive procedures, or those that limit digestive capacity. Examples  include vertical sleeve gastrectomy, or the adjustable gastric band (Lap-Band¢ç/Realize). Malabsorptive procedures, such as distal gastric bypass produce weight loss by decreasing nutrient  absorption. Biliopancreatic diversion with duodenal switch (BPD/DS) and Melyssa-en-Y gastric  bypass combine these two processes to varying degrees. While most procedures can be performed  laparoscopically (through multiple small incisions), there is a possibility that an open technique  may be required (through a large incision).  There are alternatives to surgery including medications,  diet and exercise, and behavior modification. I understand that obesity is a chronic disease with no  known cure. I am choosing bariatric surgery as treatment for this disease. Patient initials: ______________  I am informed of the potential benefits from bariatric surgery which may include improvements  in associated comorbidities (ie; diabetes, obstructive sleep apnea, GERD, high blood pressure),  potential weight loss of 50-80 percent excess weight, and general improvement in quality of life. The benefits are not guaranteed, and are dependent upon me making the necessary lifestyle  changes for success. I am aware that some patients may not lose as much weight, or still may  require treatment for medical problems after bariatric surgery. Some patients may gain back some  or all of the weight lost after bariatric surgery. Bariatric surgery is a treatment tool, not a cure for  obesity. Compliance with the dietary and lifestyle recommendations is necessary for maintenance  of lost weight in the long term. For example, I have been taught that it is recommended that  all patients maintain a healthy diet consisting of low-carbohydrate, low-sugar foods rich in lean  protein, and non-starchy vegetables. Regular exercise including aerobic activity and weight  training is encouraged, as well as regular attendance at support group meetings. Patient initials: ______________  -- 23 --  PATIENT GUIDE TO [facility]  Eliminating habits that could be detrimental to my health such as drinking alcohol or smoking  is required for all patients. I am aware that the risks of smoking and alcohol use after bariatric  surgery include anesthesia complications, stomach ulcers, liver diseases and malnutrition.   In addition, research has shown an increase in sensitivity to alcohol particularly after gastric bypass  procedures resulting in rapid increases in blood alcohol levels and possible addiction. Patient initials: ______________  Because bariatric surgery is considered major surgery, there are many potential complications that  could arise. Some of the problems are related to the bariatric procedure itself, while others are  related to anesthesia and operating on the abdomen. I understand the serious potential complications include: deep venous thrombosis/pulmonary  embolism (blood clots in the legs and or lungs); gastrointestinal leak (leakage of digestive contents  into the abdomen); sepsis (serious infection); injury to adjacent organs such as esophagus, spleen,  pancreas, liver, diaphragm (requiring intervention or surgical removal); excessive bleeding; bowel  obstruction (blockage of the intestines); or organ failure. I am informed that these complications  can be life threatening. The overall mortality rate (risk of death) from bariatric surgery is close to  0.1 percent (1/1,000), but can be as high as 0.5 percent (1/200) for some patients. Patient initials: ______________  I understand that complications requiring re-operation may occur, either immediately after initial  surgery, or later in the recovery process. Patient initials: ______________  Other potential complications which I may have include: wound infections or seromas  (fluid collection under skin); hernias (breakdown of tissue holding in abdominal contents);  gastritis or stomach ulcer (inflammation of the stomach); formation of gallstones; formation  of kidney stones or urinary tract infection; or pneumonia. Device related complications such as  foreign body reaction, band slippage, or erosion may occur with the adjustable gastric band  (Lap-Band¢ç/Realize). Patient initials: ______________  -- 21 --  PATIENT GUIDE TO [facility] Flor[facility]  I may struggle with food intolerances after bariatric surgery. These may include sugars, fats, and  lactose (milk sugar).  My taste for certain foods may change as well. Dumping Syndrome (reaction  caused by sugar rapidly entering the intestine -- symptoms include: nausea, sweating, weakness,  dizziness, flushing, possible vomiting and/or diarrhea) occurs often after bariatric surgery. Vomiting and changes in bowel habits may occur, and may be the result of eating too fast, taking  too large a bite, inappropriate food choice or not chewing properly. Chronic vomiting may be a  result of stenosis (tightening) in the stomach pouch and intestine, and may require that I have  treatment with endoscopy or surgery. Malabsorption may lead to diarrhea, foul smelling gas and  protein malnutrition. Though rarely, I may require feedings through tubes into the digestive tract  or veins for nourishment. I am aware that in some patients hair loss or thinning may occur in the  rapid weight loss phase. This is usually temporary, but can be permanent in some cases. I have  been taught about the importance of proper hydration after bariatric surgery, and understand that  dehydration is the most common reason for re-admission to the hospital after surgery. Patient initials: ______________  I understand that over time, and especially with forced overeating, the stomach pouch may stretch  (dilate) or the staple lines may break. This can result in weight regain, ulcer formation or both. Patient initials: ______________  As a female undergoing bariatric surgery, I acknowledge that I must prevent pregnancies for at  least 12 to 18 months following surgery. Pregnancy during the rapid weight loss phase can be  dangerous and harmful to both the mother and fetus. Patient initials: ______________  Vitamin and mineral deficiencies can occur after bariatric surgery. I am instructed to take vitamin  and mineral supplements daily for life (including multivitamin, iron, B vitamins, calcium and vitamin  D).  Failure to comply with these recommendations could result in my experiencing weakness,  nerve or brain damage, confusion, fatigue, rashes, anemia, hair loss, bone loss, and mood changes. I agree to have lab work at regular intervals to assess for vitamin deficiencies. I understand that it  is imperative that I receive continued follow up care after my bariatric surgery by my surgeon, my  program, or other clinician experienced in bariatric care. Patient initials: ______________  -- 21 --  PATIENT GUIDE TO [facility]  If I have an adjustable gastric band (Lap-Band¢ç/Realize), needle adjustments (addition/removal  of saline solution) are required for weight loss and to maintain weight loss. Patient initials: ______________  After I lose weight, the skin of my arms, legs, abdomen, neck, and face may become wrinkled,  droop or sag. Rashes and infections may occur in between my skin folds. Cosmetic surgery may  be indicated in some cases. This is not considered medically necessary in most cases and is rarely  covered by insurance. Patient initials: ______________  I understand that psychological changes may occur with weight loss as a result of bariatric surgery,  which can affect relationships with my loved ones. I agree to re-engage with a mental health  provider as needed. Patient initials: ______________  Some patients elect to have revisional bariatric surgery (correcting anatomic defects from a  previous bariatric operation). I am aware that in these cases, all of the previously addressed risks  apply, however they are three to five times more common. Patient initials: ______________  Follow-up appointments are vital. I agree to the following schedule of appointments after surgery:  two to three weeks, three months, six months, 12 months, and then annually for life. Additional  appointments may be necessary. Gastric Band patient follow-up is determined by my need for  adjustments but is at least once per year after the first year.   Patient initials: ______________  -- 25 --  PATIENT GUIDE  Patient Acknowledgement of Risks, Benefits,  and Alternatives to Bariatric Surgical Procedures  Patient Name:_________________________________________________________________  :_ _______________________________________________________________________  I am requesting bariatric surgery be performed on me. I believe I may benefit from bariatric  surgery. This form is designed to ensure that I understand the risks, benefits, and alternatives to  having bariatric surgery. Bariatric surgery, or surgery for morbid obesity, is major surgery. The  options available include restrictive procedures, or those that limit digestive capacity. Examples  include vertical sleeve gastrectomy, or the adjustable gastric band (Lap-Band¢ç/Realize). Malabsorptive procedures, such as distal gastric bypass produce weight loss by decreasing nutrient  absorption. Biliopancreatic diversion with duodenal switch (BPD/DS) and Melyssa-en-Y gastric  bypass combine these two processes to varying degrees. While most procedures can be performed  laparoscopically (through multiple small incisions), there is a possibility that an open technique  may be required (through a large incision). There are alternatives to surgery including medications,  diet and exercise, and behavior modification. I understand that obesity is a chronic disease with no  known cure. I am choosing bariatric surgery as treatment for this disease. Patient initials: ______________  I am informed of the potential benefits from bariatric surgery which may include improvements  in associated comorbidities (ie; diabetes, obstructive sleep apnea, GERD, high blood pressure),  potential weight loss of 50-80 percent excess weight, and general improvement in quality of life. The benefits are not guaranteed, and are dependent upon me making the necessary lifestyle  changes for success.  I am aware that some patients may not lose as much weight, or still may  require treatment for medical problems after bariatric surgery. Some patients may gain back some  or all of the weight lost after bariatric surgery. Bariatric surgery is a treatment tool, not a cure for  obesity. Compliance with the dietary and lifestyle recommendations is necessary for maintenance  of lost weight in the long term. For example, I have been taught that it is recommended that  all patients maintain a healthy diet consisting of low-carbohydrate, low-sugar foods rich in lean  protein, and non-starchy vegetables. Regular exercise including aerobic activity and weight  training is encouraged, as well as regular attendance at support group meetings. Patient initials: ______________  -- 23 --  PATIENT GUIDE TO [facility]  Eliminating habits that could be detrimental to my health such as drinking alcohol or smoking  is required for all patients. I am aware that the risks of smoking and alcohol use after bariatric  surgery include anesthesia complications, stomach ulcers, liver diseases and malnutrition. In addition, research has shown an increase in sensitivity to alcohol particularly after gastric bypass  procedures resulting in rapid increases in blood alcohol levels and possible addiction. Patient initials: ______________  Because bariatric surgery is considered major surgery, there are many potential complications that  could arise. Some of the problems are related to the bariatric procedure itself, while others are  related to anesthesia and operating on the abdomen.   I understand the serious potential complications include: deep venous thrombosis/pulmonary  embolism (blood clots in the legs and or lungs); gastrointestinal leak (leakage of digestive contents  into the abdomen); sepsis (serious infection); injury to adjacent organs such as esophagus, spleen,  pancreas, liver, diaphragm (requiring intervention or surgical removal); excessive bleeding; bowel  obstruction (blockage of the intestines); or organ failure. I am informed that these complications  can be life threatening. The overall mortality rate (risk of death) from bariatric surgery is close to  0.1 percent (1/1,000), but can be as high as 0.5 percent (1/200) for some patients. Patient initials: ______________  I understand that complications requiring re-operation may occur, either immediately after initial  surgery, or later in the recovery process. Patient initials: ______________  Other potential complications which I may have include: wound infections or seromas  (fluid collection under skin); hernias (breakdown of tissue holding in abdominal contents);  gastritis or stomach ulcer (inflammation of the stomach); formation of gallstones; formation  of kidney stones or urinary tract infection; or pneumonia. Device related complications such as  foreign body reaction, band slippage, or erosion may occur with the adjustable gastric band  (Lap-Band¢ç/Realize). Patient initials: ______________  -- 21 --  PATIENT GUIDE TO [facility] Florian  I may struggle with food intolerances after bariatric surgery. These may include sugars, fats, and  lactose (milk sugar). My taste for certain foods may change as well. Dumping Syndrome (reaction  caused by sugar rapidly entering the intestine -- symptoms include: nausea, sweating, weakness,  dizziness, flushing, possible vomiting and/or diarrhea) occurs often after bariatric surgery. Vomiting and changes in bowel habits may occur, and may be the result of eating too fast, taking  too large a bite, inappropriate food choice or not chewing properly. Chronic vomiting may be a  result of stenosis (tightening) in the stomach pouch and intestine, and may require that I have  treatment with endoscopy or surgery. Malabsorption may lead to diarrhea, foul smelling gas and  protein malnutrition.  Though rarely, I may require feedings through tubes into the digestive tract  or veins for nourishment. I am aware that in some patients hair loss or thinning may occur in the  rapid weight loss phase. This is usually temporary, but can be permanent in some cases. I have  been taught about the importance of proper hydration after bariatric surgery, and understand that  dehydration is the most common reason for re-admission to the hospital after surgery. Patient initials: ______________  I understand that over time, and especially with forced overeating, the stomach pouch may stretch  (dilate) or the staple lines may break. This can result in weight regain, ulcer formation or both. Patient initials: ______________  As a female undergoing bariatric surgery, I acknowledge that I must prevent pregnancies for at  least 12 to 18 months following surgery. Pregnancy during the rapid weight loss phase can be  dangerous and harmful to both the mother and fetus. Patient initials: ______________  Vitamin and mineral deficiencies can occur after bariatric surgery. I am instructed to take vitamin  and mineral supplements daily for life (including multivitamin, iron, B vitamins, calcium and vitamin  D). Failure to comply with these recommendations could result in my experiencing weakness,  nerve or brain damage, confusion, fatigue, rashes, anemia, hair loss, bone loss, and mood changes. I agree to have lab work at regular intervals to assess for vitamin deficiencies. I understand that it  is imperative that I receive continued follow up care after my bariatric surgery by my surgeon, my  program, or other clinician experienced in bariatric care. If I have an adjustable gastric band (Lap-Band¢ç/Realize), needle adjustments (addition/removal  of saline solution) are required for weight loss and to maintain weight loss.   Patient initials: ______________  After I lose weight, the skin of my arms, legs, abdomen, neck, and face may become wrinkled,  droop or sag. Rashes and infections may occur in between my skin folds. Cosmetic surgery may  be indicated in some cases. This is not considered medically necessary in most cases and is rarely  covered by insurance. Patient initials: ______________  I understand that psychological changes may occur with weight loss as a result of bariatric surgery,  which can affect relationships with my loved ones. I agree to re-engage with a mental health  provider as needed. Patient initials: ______________  Some patients elect to have revisional bariatric surgery (correcting anatomic defects from a  previous bariatric operation). I am aware that in these cases, all of the previously addressed risks  apply, however they are three to five times more common. Patient initials: ______________  Follow-up appointments are vital. I agree to the following schedule of appointments after surgery:  two to three weeks, three months, six months, 12 months, and then annually for life. Additional  appointments may be necessary. Gastric Band patient follow-up is determined by my need for  adjustments but is at least once per year after the first year  Diet Quick Review  7-Day Preoperative Liquid Diet  Benefits:   Reducing intake before surgery will shrink  your liver by depleting glycogen (a form of  stored energy). Reduced liver size gives better access to  stomach during surgery, which translates  to a safer surgery. Prevents the last supper syndrome. Experiencing weight loss before the  procedure encourages postoperative  compliance and jump-starts weight loss. Specifics:   Start seven days before surgery:  ____________________. NO SOLID FOODS!! Your surgery will be CANCELED if this  diet is not followed!!! Minimum of 64 ounces of fluid daily,  including protein drinks. No added sugar or carbonated beverages.    Continue to take all your prescribed  medication and your vitamin supplements  during this preoperative diet phase. Clear liquids:   Water. Sugar free, non-carbonated beverages  (crystal light, propel). Sugar free popsicles. Sugar free Jell-O. Fat free, reduced sodium broth . Decaffeinated coffee or decaffeinated tea  with artificial sweeteners. Protein:   60 grams of protein daily  (in liquid supplements). Pre-made protein drinks. Protein powder added to water. 3 gram rule -- limit sugar and fat to less  than 3 grams per 8 ounces. 4 to 6 ounces of low fat/low sugar yogurt  OR cottage cheese three to four times  during the week. [facility] Gastric Bypass and Sleeve Dietary Progression Quick Review     Date of Surgery: _      __________Clear liquid diet. Begin bariatric clear liquid diet on: ______________________________________________   64 ounces of fluid per day. Low calorie, low sugar, non-carbonated beverages:  -- Water, Crystal Light, Propel Water, Sugar Free Jell-O, Sugar Free Popsicles, bouillon. -- Start protein supplement during this stage (60 to 70 grams per day). -- Start all vitamin supplements during this stage (see pages 57-58). -- Getting your fluid in and staying hydrated is your number one priority! The clear liquid diet will last for seven days. ____________________________________________________     t. Begin bariatric soft and moist diet on: _____________________________________________   This stage of the diet will last for five weeks, unless otherwise instructed by your surgeon. Begin -- one week after surgery. End -- six weeks after surgery (or when you follow up with the registered dietitian). Soft, moist, high protein foods -- three meals per day plus protein supplements. -- Portions should emphasize on soft protein. -- Portions will be a MAXIMUM of 1 ounce of solid food and 2 to 3 ounces of cottage cheese and yogurt. -- Protein supplements should be between meals and provide 30 to 40 grams per day during  soft protein diet.   -- Continue to get 64 ounces of fluid in per day. Protein foods that are OK (SLOW TRANSITION) on the soft and moist diet:  -- First week on soft protein should focus on yogurt, cottage cheese, eggs, VEGETARIAN refried  beans, black beans, kidney beans and white beans. (NO BAKED BEANS.)  -- Second through fourth weeks should focus on yogurt, cottage cheese, eggs, canned tuna,  canned chicken, tilapia and fish (needs to be soft enough to be cut up with a fork). -- Fifth week on soft protein diet should focus on yogurt, cottage cheese, eggs, canned tuna,  canned chicken, tilapia, fish, salmon, chicken breast or turkey. Remember to continue to get 64 ounces of fluid daily on ALL stages. To be advanced to bariatric maintenance stage of the bariatric diet, follow up with the dietitian  six weeks after surgery, around:   ___________________________________________________  After having a sleeve gastrectomy I will not be able to take NSAIDs  (non-steroidal anti-inflammatory drugs) for _________ weeks. 15. After having a gastric bypass I will not be able to take NSAIDs  (non-steroidal anti-inflammatory drugs) for _____________ weeks     Please discuss all of your current medications with your surgeon at your preoperative appointment. Your surgeon will inform you regarding which medications to stop before surgery and which  medications you are to take the morning of surgery. Medications to Stop  To minimize the risk of blood loss during surgery,  you must avoid or stop taking medicines that  contain anti-inflammatories, blood thinners, arthritis  medications, and herbal supplements seven to 14 days  before your surgery. A nurse from pre-anesthesia  testing will review your list of medication. Your current  medications will be reviewed at your preoperative  appointment with your surgeon. Note: You may take prescribed narcotics, such as  Vicodin, Ultram and Neurontin.   Diabetic Medications  Adjustments in your diabetic medications will be discussed with your surgeon at your  preoperative appointment. Birth Control  In order to decrease your chance of getting a postoperative blood clot you will be required to stop  any oral contraceptive two weeks before your surgery date. During this time it is your responsibility  to use an effective form of birth control. You will be required to take a pregnancy test the morning  of surgery at the hospital and surgery will be canceled if you are pregnant. We strongly encourage  that you resume your birth control two weeks after surgery or after your first menstrual cycle  following surgery. Do NOT start any new medications within a month of surgery without  discussing it with your surgeon and/or bariatric team first.  Non-Steroidal Anti-Inflammatory Drugs (NSAIDs)  Bypass:  One class of medications to avoid after Melyssa-en-Y gastric  bypass is NSAIDs. These can cause ulcers or stomach irritation  and are linked to a kind of ulcer called a marginal ulcer after  gastric bypass. Marginal ulcers can bleed or perforate. Usually  they are not fatal, but they can cause months or years of pain,  and are a common cause of re-operation and reversal of gastric  bypass. You will NEVER be able to take NSAIDs again. Your only choice for over the counter pain medication will be  Tylenol (acetaminophen). Steroid use can also be harmful to your stomach but may be necessary in some situations. Please consult with your bariatric surgeon and prescribing physician for approval.  Sleeve gastrectomy:  Following a sleeve gastrectomy you will not be allowed to take NSAIDs unless it has been  discussed and approved by your surgeon. Most commonly taken NSAIDs to be AVOIDED!! 1. Ibuprofen  2. Advil  3. Motrin  4. Excedrin  5. Aspirin  6. Celebrex  7. Naproxen  8. Aleve  9. Voltaren  10. Mobic     ___Pregnancy  It is in your best interest to avoid pregnancy for  12 to 18 months after surgery.  Pregnancy during  the rapid weight loss phase can be dangerous  and harmful to both mother and fetus. Do you have an effective method of birth  control? Please consult with your OB/GYN or  PCP for consultation. Alcohol  Alcohol is not recommended after bariatric  surgery. Alcohol contains calories but minimal  nutrition and will work against your weight  loss goal. For example, wine contains twice  the calories per ounce that regular soda does. The absorption of alcohol changes with gastric  bypass and gastric sleeve because an enzyme  in the stomach which usually begins to digest  alcohol is absent or greatly reduced making  alcohol more potent. Alcohol may also be absorbed more quickly  into the body after gastric bypass or gastric  sleeve. The absorbed alcohol will be more  potent, and studies have demonstrated  that obesity surgery patients reach a higher  alcohol level and maintain the higher levels for  a longer period than others. In some patients  alcohol use can increase and lead to alcohol  dependence or addiction. For all of these  reasons, it is recommended to avoid alcohol  after bariatric surgery. ___________________________________________  Smoking  It is required that ALL patients stop smoking  (including e-cigarettes and marijuana) and  chewing tobacco three months before  surgery. Prior to surgery your surgeon will  order a test to verify that you have quit. Your  surgery will be canceled if you are smoking. Smoking or chewing tobacco leads to  decreased blood supply to your bodys tissues  and delays healing. Smoking harms every  organ in the body and has been linked to:   Blood clots (the leading cause of death after  bariatric surgery). Marginal ulcers after gastric bypass. Heart disease. Stroke. Chronic obstructive pulmonary  (lung) disease. Increased risk for hip fracture. Cataracts.    Cancer of the mouth, throat, esophagus,  larynx (voice box), stomach, pancreas,  bladder, cervix, and kidney. Packing For the [address] your suitcase for the hospital a day or two before your surgery. Anti-skid socks will be provided. Items to Include in Your Bag   Clothing such as short gowns, short pajamas,  shorts, tops, loose-fitting shorts, bathrobe,  capris, etc.   Tennis shoes or flat runner sole shoes that tie. Toiletries. Waterless hand . Eyeglasses, contact lenses and denture cases. A list of medications you are currently taking,  including frequency and dosages. Magazines, books, needle work, crossword  puzzles, etc.   A method of payment to pay for prescriptions. What Not to Bring to the [address] wallet or purse. Jewelry or other valuables. Open-toe slippers or shoes without backs. Countdown to Surgery  14 to 30 Days   Attend a preoperative class with the  dietitian and bariatric coordinator. Pre-admission testing will contact you. Schedule your preoperative appointment  with your surgeon. 10 to 14 Days   Stop taking medications as instructed by  your physician. Seven Days   Start liquid diet. Stop all NSAIDS/aspirin. Three Days Before Surgery   Begin CHG skin prep. Day Before Surgery   Pack for the hospital.   Surgical time will be provided via phone call. YOU MAY NOT HAVE ANYTHING TO EAT  OR DRINK AFTER MIDNIGHT ON THE DAY  BEFORE YOUR SURGERY. This includes gum,  mints, water, etc. You may brush your teeth  the morning of surgery but do not swallow  the water. Simply rinse your mouth out. Day of Surgery   Take medications and brush your teeth  with a sip (1 teaspoon) of water (only the  medications you have been instructed to  take by your surgeon). Remember to report two hours before your  surgery time.  This will give the nursing staff  sufficient time to start IVs, prep you for  surgery and answer questions  If instructed by your surgeon to use sliding scale insulin   o Use regular insulin (Novolog Pen) according to the following insulin sliding scale:  BLOOD SUGAR: AMOUNT OF INSULIN:  Under 150 no insulin  150-200 2 units  201-250 4 units  251-300 6 units  301-350 8 units  351-400 10 units  400 or greater 12 units and call physician     Things to do following the preoperative class:  ? Thoroughly read this binder before surgery. Things to do before surgery:  ? Start the preoperative liquid diet on: _____________________________________________  ? Stop all NSAIDS (see page 30) and aspirin seven days before my surgery: _________________ .  Luke Song my doctor(PCP or surgeon) regarding stopping Coumadin, Plavix or  other blood thinners. ? Purchase bariatric clear liquids (Crystal Lite, sugar free Jell-O, broth, sugar free popsicles,  protein supplement) and bariatric soft and moist foods (low fat yogurt, cottage cheese, eggs,  tuna, fish, chicken) so that Im prepared when I get home from the hospital.  ? Purchase all vitamins that will be required following surgery. o Chewable multivitamin -- two per day (ex: Flintstones Complete). o Calcium Citrate -- 1,500 milligrams (500 milligrams, three times a day). o Vitamin B-12 -- 1,000 micrograms daily. o Vitamin D3 -- 5,000 IU daily. Vitamin B1 100mg daily  ? Create a system to keep track of how many ounces of fluid I am drinking daily  o Postoperative GOAL = minimum of 64 ounces per day. ? Purchase a protein supplement that I like.  o Brand: _ _________________________________________________________________ .  o Ounces: _________________________________________________________________ .  Tegan Lindsay of protein per serving: _________________________________________________ . -- 28 --  PATIENT GUIDE TO [facility] [address]  6. YOUR SURGERY  Day of Surgery  Before You Leave For the Hospital   Brush your teeth -- upon awakening, you may brush your teeth and rinse with water, but do not  swallow the water.    Take medication -- take only the medications as instructed by your provider with a small sip of  water as soon as you get up. Wear proper clothing that is loose-fitting and easily removed. Avoid back zippers and pantyhose. Please remove ALL jewelry (leave ALL jewelry and valuables at home). Avoid using perfumes, deodorant, shaving creams or any scented lotions. Bring a case with your name on it to hold your eyeglasses, contact lenses, hearing aids  and dentures. Reporting to the [address] [place] [address] will be asked to arrive approximately two  hours before your scheduled surgery. It is important  that you arrive on time to the hospital to avoid any  problems with starting your surgery on time. In some  cases lateness could result in moving your surgery to  a much later time. Your physicians office will provide  your time of arrival to the hospital.  Where Do You Report the Day of Surgery? [place]: [address] , [place], [address]. Enter through the main entrance. Turn left just past the information desk into the  Registration Office. You will check in for surgery there. You may designate one person to be contacted when your surgery has been completed. -- 36 --  [facility]  Before Surgery  Preoperative     Preoperative Preparation Area  Once you arrive at the hospital you will be escorted to the preoperative preparation area. During the preoperative phase, a nurse will review your medical records and conduct a brief  physical examination to include vital signs (i.e., blood pressure, pulse, temperature, respirations or  breathing). An intravenous tube will be inserted with IV fluids. Your skin will be cleansed with CHG  wipes. If you wear dentures, eyeglasses or contact lenses you will need to remove them at this time.   You will see your surgeon, your anesthesiologist and meet the members of your surgical team.  Medications, such as antibiotics, may be given by anesthetists to decrease your infection risk. Other medications may be given to allay any anxiety you may have. You are allowed to have two family members or friends in the preoperative area before surgery. Going into Surgery  The operating room team will escort you into the operating room where your abdomen will be  prepared for surgery. There will be a time out -- a verification of the correct operative site for  patient safety purposes -- performed. Once in the operating room, monitoring devices, such  as a blood pressure cuff, heart monitor and oxygen monitor, will be attached. You will be given  supplemental oxygen as you are readied for anesthesia. The average length of time for a laparoscopic sleeve gastrectomy is 60 to 90 minutes. The average length of time for a Melyssa-en-Y gastric bypass is two to two and a half hours. In the Recovery Area  After your surgery is completed, you will be wheeled into the recovery room. In the recovery room:   Nurses will frequently check your vital signs (i.e., blood pressure, pulse, breathing). Nurses will medicate you for your pain as needed through the IV line. Nurses will encourage you to take deep breaths and to move your ankles and feet. Please inform your family the length of time in the recoveryYou will move to your hospital room from the recovery area when you are ready. Your post-surgical  recovery begins here. room will noreen  What to Expect During [address]  From the recovery room, you will be transferred to your hospital room on the bariatric unit  known as 2 Surgical. The private rooms are spacious with large windows and beautiful views. The staff is specially trained in caring for bariatric patients and are extremely friendly and  knowledgeable. We look forward to caring for you during your hospital stay. IV -- IV fluids will be given to help nourish your body after surgery. You will also be given IV pain  medication.  IV fluids will continue until you are discharged from the hospital.  Heart rate monitor (telemetry) -- A heart rate monitor will be worn only in the recovery room  unless otherwise indicated. Mac catheter -- A Mac catheter will be placed in your bladder while you are in the OR and  removed when you arrive to the recovery room. Nasal cannula -- Oxygen will be worn in the nose the night of surgery. Anticoagulation -- A blood thinner may be administered to you to prevent blood clots. Lovenox,  an injectable medication will be used. Drainage tube -- A drainage tube may be inserted into your abdomen during surgery. This tube  collects bloody drainage after surgery. This may be removed prior to discharge from the hospital or  you may be discharged with the drain and it will be removed by your surgeon at your postoperative  visit. If you are discharged with a drain you will be taught how to empty it and care for it. After Surgery   If you do not see your providers clean their hands, please ask them to do so. Family and friends who visit you should not touch the surgical wound or dressings. Family and friends should clean their hands with soap and water or an alcohol-based hand  rub before and after visiting you. If you do not see them clean their hands, ask them to clean  their hands. Make sure you understand how to care for your incision before you leave the hospital.   Always clean your hands before and after caring for your incision. Make sure you know who to contact if you have questions or problems after you get home. If you have any symptoms of an infection, such as redness and pain at the surgery site, drainage,  or fever, call your doctor immediately. Ask your nursing staff to help you out of bed to walk within five hours of arriving at your  hospital room. Getting out of bed to walk helps to decrease complications, such as blood clots  and pneumonia.    of Stay  Yl be required to stay in the hospital for at least ONE night, possibly TWO. Lngth of Stay  Yas Bocanegra be required to stay in the hospital for at least ONE night, possibly TWO.  edications and Pain Control Options  There are many types of pain control methods that are available to control discomfort. Effective  pain control will allow you to be up and walking shortly after surgery. Your doctor will choose  the method that is right for you. Regardless of the method of pain medication being used, it is  important for you to communicate with your nurse if the pain medication is not enough. Call your  nurse for pain medication when the pain is moderate instead of waiting for when pain is severe. What type of pain should I expect? Abdominal pain   Rib pain   Shoulder pain   Brick feeling in the center of your chest  Nausea:  Nausea following bariatric surgery is very  common. Causes include: Anesthesia   Drinking too fast   Pain medication   Surgery itself           Length of Stay  You willExpectations on your Day of Surgery   You will be allowed 1 to 2 ounces of ice chips per hour when you are admitted to the floor. They are solely for your comfort. They are not required. You will be expected to walk the night of your surgery. Please ask your nurse or nursing assistant  for help the first time you walk. Please do not walk if you still feel groggy or unsteady on your feet. Your pain will be controlled with oral and IV pain medication on the day of surgery. In order to prevent pneumonia after surgery you please use your incentive spirometer (ICS)  at least 10 times per hour (see below). be [email or web address]  Bariatric Clear Liquid Diet  You will be started on the bariatric clear liquid diet the morning after your surgery. Your GOAL  will be to drink 4 ounces per hour (SLOW and STEADY) of the following liquids: water, crystal lite,  Jell-O, broth and Unjury (protein supplement). Feel free to bring your favorite protein supplement  from home.    Two important requirements when drinking is you must slowly SIP the fluid and you must be  SITTING up. Walking  while in the hospital you are expected to walk EVERY hour in the hallway. During your hospital  stay you will also be expected to sit in the recliner throughout the day rather than lie in bed. Pain Medication  The morning after surgery you will continue with oral pain medication ONLY for your pain control. Incentive Spirometer  Continue using your incentive spirometer(ICS) 10 times per hour.

## 2022-11-15 NOTE — ROUTINE PROCESS
Patient was educated on all discharge instructions below. He/she understood and was provided a copy. He/she knows who to call for any issues post discharge. Hydration  Hydration is your NUMBER ONE priority. Dehydration is the most common reason for readmission to the hospital. Dehydration occurs when  your body does not get enough fluid to keep it functioning at its best. Your body also requires fluid  to burn its stored fat calories for energy. Carry a bottle of water with you all day, even when you are away from home; remind yourself to  drink even if you dont feel thirsty. Drinking 64 ounces of fluid is your daily goal. You can tell if  youre getting enough fluid if youre making clear, light-colored urine five to 10 times per day. Signs of dehydration can be thirst, headache, hard stools or dizziness upon sitting or standing up. You should contact your surgeons office if you are unable to drink enough fluid to stay hydrated. --   [address] [facility] after Surgery   No lifting over 15 pounds for four weeks. No driving while taking the pain medication (about seven to 10 days). No tub baths, swimming or hot tubs until incisions are healed (about two weeks). You may shower. Clean incisions daily /gently with soap and check incisions for signs of infection:  -- Redness around incision. -- Swelling at site. -- Drainage with an foul odor (pus). -- Increase tenderness around incision. Take your temperature and resting pulse in the morning and evening. Record on tracking form  given to you. Call if your temperature is greater than 101 or your pulse rate is greater than 115. Please contact your surgeon if you are having excessive abdominal pain (that lasts longer than  four hours and does not improve with prescribed pain medication), vomiting or shortness of breath. Get up and move -- do not sit in one place for more than an hour.    You need to WALK (EXERCISE) for 30 minutes per day. -- Walking around your house does not count. -- Bike, treadmill and elliptical are OK. -- NO weight lifting or sit ups. If constipated take an adult dose of Miralax (available over the counter). Contact the doctors  office if Miralax doesnt help. You may swallow pills starting the day after surgery as long as they fit inside this [place]:   Continue to use your incentive spirometer (breathing machine) for the next couple of weeks to  help prevent pneumonia. [address] [employer]  Temperature/Heart Rate Log  Take your temperature and heart rate (pulse) twice a day for 14 days. Take both in the morning and  evening at about the same time each day (when you wake up and before you go to bed when you  are relaxed). Please contact your doctors office if your:   Temperature is higher than 101 degrees. Heart rate (pulse) is higher than 115 beats per minute  (normal heart rate is 60 to 100 beats per minute). How to Take Your Heart Rate (Pulse)   Turn your left hand so that your palm is face-up. With the index and middle fingers of your right  hand, draw a line from the base of your thumb to  just below the crease in your wrist. Your fingers  should nestle just to the left of the large tendon that  pops up when you bend your wrist toward you. Dont press too hard, that will make the pulse go  away. Use gentle pressure. Wait. It can take several seconds -- and several micro-adjustments in the placement of your two  fingers on your wrist -- to find your pulse. Just keep moving your fingers down or up your wrist  in small increments (and pausing for a few seconds) until you find it. How to Take Your Pulse Rate   Find a watch with a second hand and place it on your right wrist or on the table next  to your left hand. After finding your pulse, count the number of beats for 20 seconds.    Multiply by three to get your heart rate, or beats per minute  (or just count for 60 seconds for a math-free option). Normal, resting heart rate is about 60 to 100 beats per minute. Lovenox Self Injection Guide  Prepare  Step 1: Wash and dry your hands thoroughly. Step 2: Sit or lie in a comfortable position and choose an area  on the right or left side of the abdomen at least two inches away  from the belly button. Step 3: Clean the injection site with an alcohol swab and let dry. Inject  Step 4: Remove the needle cap by pulling it straight off the syringe and  discard it in a sharps . Step 5: With your other hand, pinch an inch of the cleansed area to  make a fold in the skin. Insert the full length of the needle straight  down -- at a 90? angle -- into the fold of skin. [address] [place]  Step 6: Press the plunger with your thumb until the syringe is empty. Then pull the needle straight out and release the skin fold. Dispose  Step 7: Point the needle down and away from yourself and others,  and then push down on the plunger to activate the safety shield. Step 8: Place the used syringe in the sharps . Do NOT expel the air bubble from the syringe before the injection. Administration should be alternated between the left and right abdominal wall. The whole length  of the needle should be introduced into a skin fold held between the thumb and forefinger; the  skin fold should be held throughout the injection. To minimize bruising, do not rub the injection  site after completion of the injection. Questions about LOVENOX? Call [phone]    9. DIET AND LIFESTYLE  Key Diet Principles Following Bariatric Surgery   Begin each meal with soft moist high protein foods (i.e. chicken, turkey, yogurt, tuna, eggs,  cottage cheese, other fish and seafood). Consume a minimum of 64 ounces of fluid each day to prevent dehydration. No straws. No food and fluid together.  Stop drinking 30 minutes before a meal. You may begin fluids again  30 minutes after you finish a meal.   Eat very slowly and chew all foods completely. Keep portions small. No simple sugars or high fat foods. No carbonated beverages. No caffeine. Eat three meals per day. No skipping. Avoid snacking between meals. No alcohol. No smoking. Two Flintstones Complete Chewable vitamins each day. Take one in the morning and one at night.   1,500 milligrams Calcium Citrate per day in separate dosages. Vitamin D 3: 5,000 IU taken per day. Vitamin B-12: Take 1,000 micrograms sublingually daily. Iron: 60 milligrams per day from [facility] [employer]. Protein supplements of your choice. Must be low sugar (0 to 3 grams), low fat (0 to 3 grams) and  provide at least 35 to 40 grams of protein each day. You need 60 to 70 grams of protein (food  and supplements) each day. Minimum of 30 minutes of physical activity daily. Do not take NSAIDS . Do not take Steroids without your surgeons permission. Your Priorities After Surgery  ? Fluid: 64 ounces of fluid per day. ? Protein: 60 to 70 grams of protein per day. ? Walk every day. Clear Liquid Diet  One week of clear liquids: minimum of 64 ounces of fluid per day. Fluid:   Zero calorie liquids. No caffeine. No carbonation. No sugary drinks. No alcohol. No straws. Food   Protein drinks. Less than 3 grams of sugar and  3 grams of fat per serving. Protein drink should provide you with  60 to 70 grams of protein. Soft Protein Diet  Five weeks of soft protein (1 ounce for soft protein, 3 ounces of yogurt/cottage cheese). Three meals per day and 1 protein shake. Protein shakes should provide you with 30 grams of  protein on the soft protein diet. Slow transition:   First week on soft protein diet -- focus on yogurt, cottage cheese, eggs, vegetarian refried beans,  black beans, kidney beans and white beans.  (NO BAKED BEANS.)   Second through fourth week on soft protein diet -- focus on yogurt, cottage cheese, eggs,  canned tuna, canned chicken, tilapia and fish (needs to be soft enough to be cut up with a fork). Fifth week on soft protein diet -- focus on yogurt, cottage cheese, eggs, canned tuna, canned  chicken, tilapia, fish, salmon, chicken breast or turkey. Fluid is your #1 Priority! Continue clear liquids between meals. You will need 64 ounces of fluid per day. Fluids that you can have include:   Water. Zero calorie liquids. You will need to sip throughout the day and should therefore have a water bottle with you at all  times! No liquids with your meals. Stop 30 minutes before a meal and wait 30 minutes after a meal.ugary drinks. No alco  Protein  You will need 60 to 70 grams of protein per day. 60 to 70 grams of protein shakes when on the clear liquid diet (two to three shakes per day). 30 to 50 grams of protein shakes when on the soft protein diet (one shake per day). Eat Three Times Per Day  You will need to eat three times per day. My planned times are:  _________________________________________________________  _________________________________________________________  _________________________________________________________  Nausea, Vomiting, Stomach Pain  If you have problems with nausea, vomiting or stomach pain, try:   Eating slowly: 20 to 30 minutes per meal.   Chewing food thoroughly: 20 to 30 chews before food is swallowed. Small portions: measure portions in medicine cup. Stopping before feeling full. AVOIDING SUGAR and FRIED FOOD: sugar will cause dumping syndrome and lead to weight gain. Exercise  I will need to get a minimum of 30 minutes of exercise per day or 150 minutes of exercise per week. Walking, swimming, biking or elliptical.   Find something you enjoy! Vitamins  After surgery, you will need to take the following vitamins for the rest of your life -- FOREVER. Vitamin D 3: 5,000 IU per day. Calcium Citrate: 1,500 milligrams, taken separately.    Flintstones Complete: two per day, taken separately. Sublingual Vitamin B-12: 1,000 micrograms daily. Iron for menstruating women or patients with a history of low iron: 65 milligrams daily. We recommend going to www.[email or web address] / [email or web address]. [email or web address] and purchasing iron from there. The lemon-lime has 60 milligrams. This iron is better absorbed than over-the-counter iron. Clear Liquid Log  Getting your fluid in is top priority during this week. Fluids (MINIUM of 64 ounces per day):  ? 8 oz. ? 8 oz. ? 8 oz. ? 8 oz. ? 8 oz. ? 8 oz. ? 8 oz. ? 8 oz. ? 8 oz. ? 8 oz. ? 8 oz. ? 8 oz. Flintstones Complete Chewable: ? a.m. ? p.m. Calcium Citrate (1,500 milligrams/day):  Pill form  ? Two crushed pills (morning) ? Two crushed pills (afternoon) ? Two crushed pills (evening)  OR Upcal D (powder)  ? One pack/scoop ? One pack/scoop ? One pack/scoop  OR Celebrate Chewable Vitamins (500 mg each) or Bariatric Advantage Chewables (500 mg)  ? One chewable (morning) ? One chewable (afternoon) ? One chewable (evening)  OR Liquid Calcium Citrate  ? 1 tbsp. Calcium Citrate ? 1 tbsp. Calcium Citrate ? 1 tbsp. Calcium Citrate  Vitamin D3: ? 5,000 IU daily. Vitamin B-12: ? 1,000 micrograms per day. Iron (menstruating women or patients with a history of low iron):  ? 60 milligrams per day from [facility] [employer]. Protein drinks (protein drinks should be under 3 grams of sugar and 3 grams of fat). Protein shake (60 grams per day): ? a.m. ? p.m. Exercise: ? 30 to 40 minutes per day. Bariatric Soft and Moist Diet Shopping List   alcium Citrate (1,500 milligrams/day):  Pill form  ? Two crushed pills (morning) ? Two crushed pills (afternoon) ? Two crushed pills (evening)  OR Upcal D (powder)  ? One pack/scoop ? One pack/scoop ? One pack/scoop  OR Celebrate Chewable Vitamins (500 mg each) or Bariatric Advantage Chewables (500 mg)  ? One chewable (morning) ? One chewable (afternoon) ? One chewable (evening)  OR Liquid Calcium Citrate  ? 1 tbsp. Calcium Citrate ? 1 tbsp.  Calcium Citrate ? 1 tbsp. Calcium Citrate  Vitamin D3: ? 5,000 IU daily  Vitamin B-12: ? 1,000 micrograms per day  Iron (menstruating women or  patients with a history of low iron):  ? 60 milligrams per day  from [facility]  Protein drinks (protein drinks should be under  3 grams of sugar and 3 grams of fat). Protein shake (30 to 40 grams per day):  ? a.m. ? p.m. Exercise: ? 30 to 40 minutes per  Educated on Diet Progression    Jorge Alicea Gastric Bypass and Sleeve Dietary Progression    Patient Name:   Date of Surgery: Ice Chips start once admitted on floor. Begin Bariatric Clear Liquid Diet on:     Clear Liquid Diet: 64 oz. of fluid per day  Low calorie, low sugar, non-carbonated beverages  Water, Crystal Light, Propel Water, Sugar Free Jell-O, Sugar Free Popsicles, Bouillon  Start protein supplement during this stage. (60-70 grams per day)  Getting your fluid in and staying hydrated is your #1 priority! The clear liquid diet will last for 7 days. Begin Bariatric Soft and Moist on: This stage of the diet will last for 5 weeks, unless otherwise instructed by your surgeon. Begin:  1 week post-op   End:  6 weeks post-op (or when you follow up with the Registered Dietitian)    Soft, moist, high protein foods: 3 meals per day plus protein supplements. Portions should emphasize on soft protein. Portions will be a MAXIMUM of:   1 ounce of solid food   2-3 ounces of cottage cheese and yogurt. Protein supplements should be between meals and provide 30-40 grams per day during soft protein diet. Continue to get 64 ounces of fluid in per day. Protein foods that are ok on the Soft and Moist Diet include:  Slow transition:  1st week on soft protein should focus on:  Yogurt, cottage cheese, eggs  2nd -4th  week on soft protein diet should focus on: yogurt, cottage cheese, eggs, canned tuna, canned chicken, tilapia, fish (needs to be soft enough to be cut up with a fork)  5th week on soft protein diet should focus on: Yogurt, cottage cheese, eggs, canned tuna, canned chicken, tilapia, fish, salmon, chicken breast, or turkey. Remember to continue to get 64 ounces of fluid daily on ALL Stages. To be advanced to Bariatric Maintenance Stage of the bariatric diet, follow up with the dietitian 6 weeks post-op, around:         For any additional questions, please refer to your blue binder that was provided to you at the start of the bariatric program.

## 2022-11-17 ENCOUNTER — OFFICE VISIT (OUTPATIENT)
Dept: SURGERY | Age: 37
End: 2022-11-17
Payer: COMMERCIAL

## 2022-11-17 VITALS
BODY MASS INDEX: 50.85 KG/M2 | HEART RATE: 98 BPM | HEIGHT: 60 IN | DIASTOLIC BLOOD PRESSURE: 78 MMHG | SYSTOLIC BLOOD PRESSURE: 126 MMHG | TEMPERATURE: 98 F | WEIGHT: 259 LBS | RESPIRATION RATE: 18 BRPM | OXYGEN SATURATION: 98 %

## 2022-11-17 DIAGNOSIS — K21.9 GASTROESOPHAGEAL REFLUX DISEASE, UNSPECIFIED WHETHER ESOPHAGITIS PRESENT: ICD-10-CM

## 2022-11-17 DIAGNOSIS — E66.01 CLASS 3 SEVERE OBESITY WITH SERIOUS COMORBIDITY AND BODY MASS INDEX (BMI) OF 50.0 TO 59.9 IN ADULT, UNSPECIFIED OBESITY TYPE (HCC): Primary | ICD-10-CM

## 2022-11-17 DIAGNOSIS — I10 HYPERTENSION, UNSPECIFIED TYPE: ICD-10-CM

## 2022-11-17 PROCEDURE — 3078F DIAST BP <80 MM HG: CPT | Performed by: STUDENT IN AN ORGANIZED HEALTH CARE EDUCATION/TRAINING PROGRAM

## 2022-11-17 PROCEDURE — 3074F SYST BP LT 130 MM HG: CPT | Performed by: STUDENT IN AN ORGANIZED HEALTH CARE EDUCATION/TRAINING PROGRAM

## 2022-11-17 PROCEDURE — 99204 OFFICE O/P NEW MOD 45 MIN: CPT | Performed by: STUDENT IN AN ORGANIZED HEALTH CARE EDUCATION/TRAINING PROGRAM

## 2022-11-17 RX ORDER — SCOLOPAMINE TRANSDERMAL SYSTEM 1 MG/1
1 PATCH, EXTENDED RELEASE TRANSDERMAL
Qty: 1 PATCH | Refills: 0 | Status: SHIPPED | OUTPATIENT
Start: 2022-11-17

## 2022-11-17 RX ORDER — ERGOCALCIFEROL 1.25 MG/1
50000 CAPSULE ORAL
Qty: 12 CAPSULE | Refills: 1 | Status: SHIPPED | OUTPATIENT
Start: 2022-11-17

## 2022-11-17 NOTE — PROGRESS NOTES
Chief Complaint   Patient presents with    Follow-up     Bariatric program    1. Have you been to the ER, urgent care clinic since your last visit? Hospitalized since your last visit? No    2. Have you seen or consulted any other health care providers outside of the [address] [place] [address] since your last visit? Include any pap smears or colon screening. Yes pcp  Pt ID confirmed    Weight Loss Metrics 11/17/2022 11/17/2022 7/27/2022 7/27/2022 7/23/2022 7/22/2022 5/6/2022   Pre op / Initial Wt 259 - 266 - - - 266   Today's Wt - 259 lb - 260 lb - 254 lb -   BMI - 50.58 kg/m2 - 50.78 kg/m2 49.61 kg/m2 - -   Ideal Body Wt 120 - 120 - - - 120   Excess Body Wt 139 - 146 - - - 146   Goal Wt 148 - 149 - - - 149   Wt loss to date 0 - 6 - - - 0   % Wt Loss 0 - 0.05 - - - 0   80% .2 - 116.8 - - - 116.8       Body mass index is 50.58 kg/m².

## 2022-11-17 NOTE — PROGRESS NOTES
Bariatric Surgery Initial Consult    Patient: Alan Miles MRN: [id number]  SSN: [id number]    YOB: 1985  Age: 40 y.o. Sex: female      Subjective:      CC: Morbid Obesity    Current Weight: 259 down approximately 6 pounds from program entry  Body mass index is 50.58 kg/m². Ideal body weight: 45.5 kg (100 lb 4.9 oz)  Adjusted ideal body weight: 74.3 kg (163 lb 12.6 oz)  Excess Body Weight: 139    Alan Miles is a 40 y.o. female who is being seen for new bariatric consultation. She was previously in the pathway following with Dr. Venita Upton and planning for a laparoscopic gastric bypass. She reports being obese her entire life. She has had multiple prior attempts at meaningful and sustained weight loss, she was able to lose approximately 20 pounds in 2019 with subsequent weight regain. She has multiple obesity related comorbidities including hypertension, prediabetes, hyperlipidemia, GERD, and stress urinary incontinence. She reports no interim changes in her health since her previous visit. She was told that her vitamin D was low previously though she has not been taking supplementation for this. PREOP:  10/2022: Cleared by dietitian for surgery Maddy Su)  2022: Cleared by psych for surgery Sammy Agustin)  Labs reviewed, elevated total cholesterol and LDL, hemoglobin A1c 6.4. Vitamin D low at 12.3. H. pylori breath test negative  10/2022: EKG normal sinus rhythm  10/2022: Chest x-ray normal    Past Medical History:   Diagnosis Date    Adverse effect of anesthesia     after c sections, pt get very cold and has trembles/shakes     Anemia     HTN (hypertension)      Past Surgical History:   Procedure Laterality Date    HX  SECTION      x4    HX GYN      tubal ligation,ablation      No Known Allergies  Current Outpatient Medications   Medication Sig Dispense Refill    ferrous sulfate 325 mg (65 mg iron) tablet Take  by mouth Daily (before breakfast).       docusate sodium (COLACE) 100 mg capsule Take 100 mg by mouth two (2) times a day. hydroCHLOROthiazide (HYDRODIURIL) 25 mg tablet Take 25 mg by mouth daily. atorvastatin (LIPITOR) 20 mg tablet Take 20 mg by mouth daily. amLODIPine (NORVASC) 5 mg tablet Take 5 mg by mouth daily. cyanocobalamin (VITAMIN B12) 100 mcg tablet Take 100 mcg by mouth daily. multivitamin (ONE A DAY) tablet Take 1 Tab by mouth daily. OTHER Iron infusions   Once a month  Last infusion was 4/14/2020 (Patient not taking: Reported on 11/17/2022)       Social History     Tobacco Use    Smoking status: Never    Smokeless tobacco: Never   Substance Use Topics    Alcohol use: No     Family History   Problem Relation Age of Onset    Hypertension Mother     Hypertension Father        Review of Systems:    General: Denies fevers, chills, night sweats, fatigue, weight loss, or weight gain. HEENT: Denies changes in auditory or visual acuity, recurrent pharyngitis, epistaxis, chronic rhinorrhea, vertigo    Respiratory: Denies increasing shortness of breath, productive cough, hemoptysis    Cardiac: Denies known history of cardiac disease, heart murmur, palpitations    GI: Denies dysphagia, recurrent emesis, hematemesis, changes in bowel habits, hematochezia, melena    : Denies hematuria frequency urgency dysuria    Musculoskeletal: + Chronic back, knee arthropathy.   Denies fractures, dislocations    Neurologic: Denies history of CVA, paralysis paresthesias, recurrent cephalgia, seizures    Endocrine: Denies polyuria, polydipsia, polyphagia, heat and cold intolerance    Lymph/heme: Denies a history of malignancy, anemia, bruising, blood transfusions    Integumentary: Negative for dermatitis     Psych: Denies a history of depression or anxiety    Objective:     Vitals:    11/17/22 1438   BP: 126/78   Pulse: 98   Resp: 18   Temp: 98 °F (36.7 °C)   SpO2: 98%   Weight: 117.5 kg (259 lb)   Height: 5' (1.524 m)        General: no acute distress, nontoxic in appearance. Head: Normocephalic, atraumatic  Mouth: Clear, no overt lesions, oral mucosa is pink and moist.  Neck: Supple, no masses, trachea midline  Resp: Clear to auscultation bilaterally, no wheezing. Excursions normal and symmetrical.  Cardio: Regular rate and rhythm, no murmurs  Abdomen: soft, nontender, nondistended, no hernias. Extremities: Warm, well perfused, no tenderness or swelling, normal gait/station, without edema or varicosities  Neuro: Sensation and strength grossly intact and symmetrical.  Psych: Alert and oriented to person, place, and time. Labs:     Lab Results   Component Value Date/Time    WBC 5.9 10/28/2022 09:56 AM    HGB 11.8 (L) 10/28/2022 09:56 AM    HCT 36.9 10/28/2022 09:56 AM    PLATELET 196 (H) 01/98/8128 09:56 AM    MCV 82.6 10/28/2022 09:56 AM     Lab Results   Component Value Date/Time    Sodium 138 10/28/2022 09:56 AM    Potassium 4.2 10/28/2022 09:56 AM    Chloride 102 10/28/2022 09:56 AM    CO2 29 10/28/2022 09:56 AM    Anion gap 7 10/28/2022 09:56 AM    Glucose 83 10/28/2022 09:56 AM    BUN 12 10/28/2022 09:56 AM    Creatinine 0.78 10/28/2022 09:56 AM    BUN/Creatinine ratio 15 10/28/2022 09:56 AM    GFR est AA >60 02/02/2020 04:06 AM    GFR est non-AA >60 02/02/2020 04:06 AM    Calcium 9.6 10/28/2022 09:56 AM    Bilirubin, total 0.3 10/28/2022 09:56 AM    Alk. phosphatase 101 10/28/2022 09:56 AM    Protein, total 8.2 10/28/2022 09:56 AM    Albumin 3.7 10/28/2022 09:56 AM    Globulin 4.5 (H) 10/28/2022 09:56 AM    A-G Ratio 0.8 10/28/2022 09:56 AM    ALT (SGPT) 20 10/28/2022 09:56 AM     Lab Results   Component Value Date/Time    Iron 22 (L) 10/28/2022 09:56 AM    Ferritin 41 10/28/2022 09:56 AM     Lab Results   Component Value Date/Time    Folate 9.6 10/28/2022 09:56 AM     Lab Results   Component Value Date/Time    Vitamin D 25-Hydroxy 12.3 (L) 10/28/2022 10:01 AM             Assessment:      This patient is a 40 y.o. obese female with a current Body mass index is 50.58 kg/m². who is considering bariatric surgery, and would be an appropriate candidate for enrollment in the preoperative pathway. The patient is considering Laparoscopic Melyssa-en-Y Gastric Bypass for surgical weight loss due to their ineffective progress with medical forms of weight loss and the urging of their physicians who care for their primary medical issues. The patient  now presents  for consideration for weight loss surgery understanding the benefits of this over a medical approach of weight loss as was discussed in our seminar on weight loss surgery. They have discussed their plans both with their family and primary care physician who is in support of their pursuit of such. The patient's goal weight is 150 lb. These goals are consistent with expected outcomes of their desired operation. Her Medical goals are resolution of these health issues. The possible short and long term  complications of the gastric bypass were also discussed, to include but not limited to;death, DVT/PE, staple line leak, bleeding, stricture formation, surgical site infection, internal hernia  and pouch dilation. Specific weight related outcomes for success were also discussed with an emphasis on careful and close follow-up with the first year and dietary behavior modification over the first years as baseline cyclical hunger returns  The patient expressed an understanding of the above factors, and her questions were answered in their entirety. Plan:     The patient has successfully completed the preoperative bariatric pathway and associated requirements and is deemed an appropriate candidate for surgery.   She is currently scheduled for laparoscopic gastric bypass on 11/30  Prescription sent for scopolamine patch to be placed the evening prior to surgery  I discussed the need for a 10-day liquid diet prior to surgery  Prescription also sent for vitamin D supplementation to start prior to surgery    Signed By: Vera Pandya Nimisha Esquivel MD     November 17, 2022

## 2022-11-17 NOTE — H&P (VIEW-ONLY)
Bariatric Surgery Initial Consult    Patient: Asher Sheppard MRN: [id number]  SSN: [id number]    YOB: 1985  Age: 40 y.o. Sex: female      Subjective:      CC: Morbid Obesity    Current Weight: 259 down approximately 6 pounds from program entry  Body mass index is 50.58 kg/m². Ideal body weight: 45.5 kg (100 lb 4.9 oz)  Adjusted ideal body weight: 74.3 kg (163 lb 12.6 oz)  Excess Body Weight: 139    Asher Sheppard is a 40 y.o. female who is being seen for new bariatric consultation. She was previously in the pathway following with Dr. Ryland Garcia and planning for a laparoscopic gastric bypass. She reports being obese her entire life. She has had multiple prior attempts at meaningful and sustained weight loss, she was able to lose approximately 20 pounds in 2019 with subsequent weight regain. She has multiple obesity related comorbidities including hypertension, prediabetes, hyperlipidemia, GERD, and stress urinary incontinence. She reports no interim changes in her health since her previous visit. She was told that her vitamin D was low previously though she has not been taking supplementation for this. PREOP:  10/2022: Cleared by dietitian for surgery Tito Rucker)  2022: Cleared by psych for surgery Diamond Swenson)  Labs reviewed, elevated total cholesterol and LDL, hemoglobin A1c 6.4. Vitamin D low at 12.3. H. pylori breath test negative  10/2022: EKG normal sinus rhythm  10/2022: Chest x-ray normal    Past Medical History:   Diagnosis Date    Adverse effect of anesthesia     after c sections, pt get very cold and has trembles/shakes     Anemia     HTN (hypertension)      Past Surgical History:   Procedure Laterality Date    HX  SECTION      x4    HX GYN      tubal ligation,ablation      No Known Allergies  Current Outpatient Medications   Medication Sig Dispense Refill    ferrous sulfate 325 mg (65 mg iron) tablet Take  by mouth Daily (before breakfast).       docusate sodium (COLACE) 100 mg capsule Take 100 mg by mouth two (2) times a day. hydroCHLOROthiazide (HYDRODIURIL) 25 mg tablet Take 25 mg by mouth daily. atorvastatin (LIPITOR) 20 mg tablet Take 20 mg by mouth daily. amLODIPine (NORVASC) 5 mg tablet Take 5 mg by mouth daily. cyanocobalamin (VITAMIN B12) 100 mcg tablet Take 100 mcg by mouth daily. multivitamin (ONE A DAY) tablet Take 1 Tab by mouth daily. OTHER Iron infusions   Once a month  Last infusion was 4/14/2020 (Patient not taking: Reported on 11/17/2022)       Social History     Tobacco Use    Smoking status: Never    Smokeless tobacco: Never   Substance Use Topics    Alcohol use: No     Family History   Problem Relation Age of Onset    Hypertension Mother     Hypertension Father        Review of Systems:    General: Denies fevers, chills, night sweats, fatigue, weight loss, or weight gain. HEENT: Denies changes in auditory or visual acuity, recurrent pharyngitis, epistaxis, chronic rhinorrhea, vertigo    Respiratory: Denies increasing shortness of breath, productive cough, hemoptysis    Cardiac: Denies known history of cardiac disease, heart murmur, palpitations    GI: Denies dysphagia, recurrent emesis, hematemesis, changes in bowel habits, hematochezia, melena    : Denies hematuria frequency urgency dysuria    Musculoskeletal: + Chronic back, knee arthropathy.   Denies fractures, dislocations    Neurologic: Denies history of CVA, paralysis paresthesias, recurrent cephalgia, seizures    Endocrine: Denies polyuria, polydipsia, polyphagia, heat and cold intolerance    Lymph/heme: Denies a history of malignancy, anemia, bruising, blood transfusions    Integumentary: Negative for dermatitis     Psych: Denies a history of depression or anxiety    Objective:     Vitals:    11/17/22 1438   BP: 126/78   Pulse: 98   Resp: 18   Temp: 98 °F (36.7 °C)   SpO2: 98%   Weight: 117.5 kg (259 lb)   Height: 5' (1.524 m)        General: no acute distress, nontoxic in appearance. Head: Normocephalic, atraumatic  Mouth: Clear, no overt lesions, oral mucosa is pink and moist.  Neck: Supple, no masses, trachea midline  Resp: Clear to auscultation bilaterally, no wheezing. Excursions normal and symmetrical.  Cardio: Regular rate and rhythm, no murmurs  Abdomen: soft, nontender, nondistended, no hernias. Extremities: Warm, well perfused, no tenderness or swelling, normal gait/station, without edema or varicosities  Neuro: Sensation and strength grossly intact and symmetrical.  Psych: Alert and oriented to person, place, and time. Labs:     Lab Results   Component Value Date/Time    WBC 5.9 10/28/2022 09:56 AM    HGB 11.8 (L) 10/28/2022 09:56 AM    HCT 36.9 10/28/2022 09:56 AM    PLATELET 677 (H) 99/43/3639 09:56 AM    MCV 82.6 10/28/2022 09:56 AM     Lab Results   Component Value Date/Time    Sodium 138 10/28/2022 09:56 AM    Potassium 4.2 10/28/2022 09:56 AM    Chloride 102 10/28/2022 09:56 AM    CO2 29 10/28/2022 09:56 AM    Anion gap 7 10/28/2022 09:56 AM    Glucose 83 10/28/2022 09:56 AM    BUN 12 10/28/2022 09:56 AM    Creatinine 0.78 10/28/2022 09:56 AM    BUN/Creatinine ratio 15 10/28/2022 09:56 AM    GFR est AA >60 02/02/2020 04:06 AM    GFR est non-AA >60 02/02/2020 04:06 AM    Calcium 9.6 10/28/2022 09:56 AM    Bilirubin, total 0.3 10/28/2022 09:56 AM    Alk. phosphatase 101 10/28/2022 09:56 AM    Protein, total 8.2 10/28/2022 09:56 AM    Albumin 3.7 10/28/2022 09:56 AM    Globulin 4.5 (H) 10/28/2022 09:56 AM    A-G Ratio 0.8 10/28/2022 09:56 AM    ALT (SGPT) 20 10/28/2022 09:56 AM     Lab Results   Component Value Date/Time    Iron 22 (L) 10/28/2022 09:56 AM    Ferritin 41 10/28/2022 09:56 AM     Lab Results   Component Value Date/Time    Folate 9.6 10/28/2022 09:56 AM     Lab Results   Component Value Date/Time    Vitamin D 25-Hydroxy 12.3 (L) 10/28/2022 10:01 AM             Assessment:      This patient is a 40 y.o. obese female with a current Body mass index is 50.58 kg/m². who is considering bariatric surgery, and would be an appropriate candidate for enrollment in the preoperative pathway. The patient is considering Laparoscopic Melyssa-en-Y Gastric Bypass for surgical weight loss due to their ineffective progress with medical forms of weight loss and the urging of their physicians who care for their primary medical issues. The patient  now presents  for consideration for weight loss surgery understanding the benefits of this over a medical approach of weight loss as was discussed in our seminar on weight loss surgery. They have discussed their plans both with their family and primary care physician who is in support of their pursuit of such. The patient's goal weight is 150 lb. These goals are consistent with expected outcomes of their desired operation. Her Medical goals are resolution of these health issues. The possible short and long term  complications of the gastric bypass were also discussed, to include but not limited to;death, DVT/PE, staple line leak, bleeding, stricture formation, surgical site infection, internal hernia  and pouch dilation. Specific weight related outcomes for success were also discussed with an emphasis on careful and close follow-up with the first year and dietary behavior modification over the first years as baseline cyclical hunger returns  The patient expressed an understanding of the above factors, and her questions were answered in their entirety. Plan:     The patient has successfully completed the preoperative bariatric pathway and associated requirements and is deemed an appropriate candidate for surgery.   She is currently scheduled for laparoscopic gastric bypass on 11/30  Prescription sent for scopolamine patch to be placed the evening prior to surgery  I discussed the need for a 10-day liquid diet prior to surgery  Prescription also sent for vitamin D supplementation to start prior to surgery    Signed By: Yisel Rey Susi Ybarra MD     November 17, 2022

## 2022-11-21 NOTE — PERIOP NOTES
PRE-SURGICAL INSTRUCTIONS        Patient's Name:  Felipe Hand      [facility]'P Date:  11/21/2022            Covid Testing Date and Time:    Surgery Date:  11/30/2022                Do NOT eat or drink anything, including candy, gum, or ice chips after midnight on 11/30/2022, unless you have specific instructions from your surgeon or anesthesia provider to do so. You may brush your teeth before coming to the hospital.  No smoking 24 hours prior to the day of surgery. No alcohol 24 hours prior to the day of surgery. No recreational drugs for one week prior to the day of surgery. Leave all valuables, including money/purse, at home. Remove all jewelry, nail polish, acrylic nails, and makeup (including mascara); no lotions powders, deodorant, or perfume/cologne/after shave on the skin. Follow instruction for Hibiclens washes and CHG wipes from surgeon's office. Glasses/contact lenses and dentures may be worn to the hospital.  They will be removed prior to surgery. Call your doctor if symptoms of a cold or illness develop within 24-48 hours prior to your surgery. 11.  If you are having an outpatient procedure, please make arrangements for a responsible ADULT TO [address] [place] [address] and stay with you for 24 hours after your surgery. 12. ONE VISITOR in the hospital at this time for outpatient procedures. Exceptions may be made for surgical admissions, per nursing unit guidelines      Special Instructions:      Bring list of CURRENT medications. Bring any pertinent legal medical records. Take these medications the morning of surgery with a sip of water:  per surgeon    Follow physician instructions about stopping anticoagulants. On the day of surgery, come in the main entrance of [facility]. Let the  at the desk know you are there for surgery. A staff member will come escort you to the surgical area on the second floor.     If you have any questions or concerns, please do not hesitate to call:     (Prior to the day of surgery) [facility] department:  [phone]   (Day of surgery) Pre-Op department:  [phone]    These surgical instructions were reviewed with Dianna Nicole during the [facility] phone call.

## 2022-11-29 ENCOUNTER — TELEPHONE (OUTPATIENT)
Dept: SURGERY | Age: 37
End: 2022-11-29

## 2022-11-29 ENCOUNTER — ANESTHESIA EVENT (OUTPATIENT)
Dept: SURGERY | Age: 37
DRG: 621 | End: 2022-11-29
Payer: COMMERCIAL

## 2022-11-30 ENCOUNTER — ANESTHESIA (OUTPATIENT)
Dept: SURGERY | Age: 37
DRG: 621 | End: 2022-11-30
Payer: COMMERCIAL

## 2022-11-30 ENCOUNTER — HOSPITAL ENCOUNTER (INPATIENT)
Age: 37
LOS: 1 days | Discharge: HOME | DRG: 621 | End: 2022-12-01
Attending: STUDENT IN AN ORGANIZED HEALTH CARE EDUCATION/TRAINING PROGRAM | Admitting: STUDENT IN AN ORGANIZED HEALTH CARE EDUCATION/TRAINING PROGRAM
Payer: COMMERCIAL

## 2022-11-30 DIAGNOSIS — G89.18 ACUTE POST-OPERATIVE PAIN: Primary | ICD-10-CM

## 2022-11-30 PROBLEM — E66.01 MORBID OBESITY (HCC): Status: ACTIVE | Noted: 2022-11-30

## 2022-11-30 LAB — HCG UR QL: NEGATIVE

## 2022-11-30 PROCEDURE — 77030018706 HC CORD MPLR COVD -A: Performed by: STUDENT IN AN ORGANIZED HEALTH CARE EDUCATION/TRAINING PROGRAM

## 2022-11-30 PROCEDURE — 77030018813 HC SCIS LAPSCP EPIX DISP AMR -B: Performed by: STUDENT IN AN ORGANIZED HEALTH CARE EDUCATION/TRAINING PROGRAM

## 2022-11-30 PROCEDURE — 77030025303 HC STPLR ENDOSC J&J -G: Performed by: STUDENT IN AN ORGANIZED HEALTH CARE EDUCATION/TRAINING PROGRAM

## 2022-11-30 PROCEDURE — 0FB04ZX EXCISION OF LIVER, PERCUTANEOUS ENDOSCOPIC APPROACH, DIAGNOSTIC: ICD-10-PCS | Performed by: STUDENT IN AN ORGANIZED HEALTH CARE EDUCATION/TRAINING PROGRAM

## 2022-11-30 PROCEDURE — 77030016151 HC PROTCTR LNS DFOG COVD -B: Performed by: STUDENT IN AN ORGANIZED HEALTH CARE EDUCATION/TRAINING PROGRAM

## 2022-11-30 PROCEDURE — 74011250636 HC RX REV CODE- 250/636: Performed by: STUDENT IN AN ORGANIZED HEALTH CARE EDUCATION/TRAINING PROGRAM

## 2022-11-30 PROCEDURE — 88307 TISSUE EXAM BY PATHOLOGIST: CPT

## 2022-11-30 PROCEDURE — 2709999900 HC NON-CHARGEABLE SUPPLY

## 2022-11-30 PROCEDURE — 88313 SPECIAL STAINS GROUP 2: CPT

## 2022-11-30 PROCEDURE — 77030018875 HC APPL CLP LIG4 J&J -B: Performed by: STUDENT IN AN ORGANIZED HEALTH CARE EDUCATION/TRAINING PROGRAM

## 2022-11-30 PROCEDURE — 76010000132 HC OR TIME 2.5 TO 3 HR: Performed by: STUDENT IN AN ORGANIZED HEALTH CARE EDUCATION/TRAINING PROGRAM

## 2022-11-30 PROCEDURE — 77030002996 HC SUT SLK J&J -A: Performed by: STUDENT IN AN ORGANIZED HEALTH CARE EDUCATION/TRAINING PROGRAM

## 2022-11-30 PROCEDURE — 77030040361 HC SLV COMPR DVT MDII -B: Performed by: STUDENT IN AN ORGANIZED HEALTH CARE EDUCATION/TRAINING PROGRAM

## 2022-11-30 PROCEDURE — 77030008602 HC TRCR ENDOSC EPATH J&J -B: Performed by: STUDENT IN AN ORGANIZED HEALTH CARE EDUCATION/TRAINING PROGRAM

## 2022-11-30 PROCEDURE — 77030008437 HC REINF STRP REINF SEMGD WLGO -C: Performed by: STUDENT IN AN ORGANIZED HEALTH CARE EDUCATION/TRAINING PROGRAM

## 2022-11-30 PROCEDURE — 77030002966 HC SUT PDS J&J -A: Performed by: STUDENT IN AN ORGANIZED HEALTH CARE EDUCATION/TRAINING PROGRAM

## 2022-11-30 PROCEDURE — 77030036732 HC RELD STPLR VASC J&J -F: Performed by: STUDENT IN AN ORGANIZED HEALTH CARE EDUCATION/TRAINING PROGRAM

## 2022-11-30 PROCEDURE — 76210000016 HC OR PH I REC 1 TO 1.5 HR: Performed by: STUDENT IN AN ORGANIZED HEALTH CARE EDUCATION/TRAINING PROGRAM

## 2022-11-30 PROCEDURE — 76060000036 HC ANESTHESIA 2.5 TO 3 HR: Performed by: STUDENT IN AN ORGANIZED HEALTH CARE EDUCATION/TRAINING PROGRAM

## 2022-11-30 PROCEDURE — 2709999900 HC NON-CHARGEABLE SUPPLY: Performed by: STUDENT IN AN ORGANIZED HEALTH CARE EDUCATION/TRAINING PROGRAM

## 2022-11-30 PROCEDURE — 77030002912 HC SUT ETHBND J&J -A: Performed by: STUDENT IN AN ORGANIZED HEALTH CARE EDUCATION/TRAINING PROGRAM

## 2022-11-30 PROCEDURE — 74011250636 HC RX REV CODE- 250/636: Performed by: NURSE ANESTHETIST, CERTIFIED REGISTERED

## 2022-11-30 PROCEDURE — 0DJ08ZZ INSPECTION OF UPPER INTESTINAL TRACT, VIA NATURAL OR ARTIFICIAL OPENING ENDOSCOPIC: ICD-10-PCS | Performed by: STUDENT IN AN ORGANIZED HEALTH CARE EDUCATION/TRAINING PROGRAM

## 2022-11-30 PROCEDURE — 77030021158 HC TRCR BLN GELPRT AMR -B: Performed by: STUDENT IN AN ORGANIZED HEALTH CARE EDUCATION/TRAINING PROGRAM

## 2022-11-30 PROCEDURE — 77030031139 HC SUT VCRL2 J&J -A: Performed by: STUDENT IN AN ORGANIZED HEALTH CARE EDUCATION/TRAINING PROGRAM

## 2022-11-30 PROCEDURE — 77030002916 HC SUT ETHLN J&J -A: Performed by: STUDENT IN AN ORGANIZED HEALTH CARE EDUCATION/TRAINING PROGRAM

## 2022-11-30 PROCEDURE — 77030030073 HC APPL ENDOSC CLP AMR -C: Performed by: STUDENT IN AN ORGANIZED HEALTH CARE EDUCATION/TRAINING PROGRAM

## 2022-11-30 PROCEDURE — 74011000250 HC RX REV CODE- 250: Performed by: NURSE ANESTHETIST, CERTIFIED REGISTERED

## 2022-11-30 PROCEDURE — 77030018836 HC SOL IRR NACL ICUM -A: Performed by: STUDENT IN AN ORGANIZED HEALTH CARE EDUCATION/TRAINING PROGRAM

## 2022-11-30 PROCEDURE — 77030008598 HC TRCR ENDOSC BLDLS J&J -B: Performed by: STUDENT IN AN ORGANIZED HEALTH CARE EDUCATION/TRAINING PROGRAM

## 2022-11-30 PROCEDURE — 77030040922 HC BLNKT HYPOTHRM STRY -A: Performed by: STUDENT IN AN ORGANIZED HEALTH CARE EDUCATION/TRAINING PROGRAM

## 2022-11-30 PROCEDURE — 0D164ZA BYPASS STOMACH TO JEJUNUM, PERCUTANEOUS ENDOSCOPIC APPROACH: ICD-10-PCS | Performed by: STUDENT IN AN ORGANIZED HEALTH CARE EDUCATION/TRAINING PROGRAM

## 2022-11-30 PROCEDURE — 81025 URINE PREGNANCY TEST: CPT

## 2022-11-30 PROCEDURE — 74011000258 HC RX REV CODE- 258: Performed by: STUDENT IN AN ORGANIZED HEALTH CARE EDUCATION/TRAINING PROGRAM

## 2022-11-30 PROCEDURE — 77030002933 HC SUT MCRYL J&J -A: Performed by: STUDENT IN AN ORGANIZED HEALTH CARE EDUCATION/TRAINING PROGRAM

## 2022-11-30 PROCEDURE — 77030009968 HC RELD STPLR ENDOSC J&J -D: Performed by: STUDENT IN AN ORGANIZED HEALTH CARE EDUCATION/TRAINING PROGRAM

## 2022-11-30 PROCEDURE — 65270000029 HC RM PRIVATE

## 2022-11-30 PROCEDURE — 77030010031 HC SCIS ENDOSC MPLR J&J -C: Performed by: STUDENT IN AN ORGANIZED HEALTH CARE EDUCATION/TRAINING PROGRAM

## 2022-11-30 PROCEDURE — C9290 INJ, BUPIVACAINE LIPOSOME: HCPCS | Performed by: STUDENT IN AN ORGANIZED HEALTH CARE EDUCATION/TRAINING PROGRAM

## 2022-11-30 PROCEDURE — 77030025171 HC FCPS ENDOSC DISP 2 J&J -B: Performed by: STUDENT IN AN ORGANIZED HEALTH CARE EDUCATION/TRAINING PROGRAM

## 2022-11-30 PROCEDURE — 77030008603 HC TRCR ENDOSC EPATH J&J -C: Performed by: STUDENT IN AN ORGANIZED HEALTH CARE EDUCATION/TRAINING PROGRAM

## 2022-11-30 PROCEDURE — 74011000250 HC RX REV CODE- 250: Performed by: STUDENT IN AN ORGANIZED HEALTH CARE EDUCATION/TRAINING PROGRAM

## 2022-11-30 PROCEDURE — 74011250637 HC RX REV CODE- 250/637: Performed by: STUDENT IN AN ORGANIZED HEALTH CARE EDUCATION/TRAINING PROGRAM

## 2022-11-30 PROCEDURE — 77030008756 HC TU IRR SUC STRY -B: Performed by: STUDENT IN AN ORGANIZED HEALTH CARE EDUCATION/TRAINING PROGRAM

## 2022-11-30 PROCEDURE — 77030011808 HC STPLR ENDOSCOPIC J&J -D: Performed by: STUDENT IN AN ORGANIZED HEALTH CARE EDUCATION/TRAINING PROGRAM

## 2022-11-30 RX ORDER — ACETAMINOPHEN 650 MG/1
650 SUPPOSITORY RECTAL ONCE
Status: ACTIVE | OUTPATIENT
Start: 2022-11-30 | End: 2022-12-01

## 2022-11-30 RX ORDER — SODIUM CHLORIDE, SODIUM LACTATE, POTASSIUM CHLORIDE, CALCIUM CHLORIDE 600; 310; 30; 20 MG/100ML; MG/100ML; MG/100ML; MG/100ML
INJECTION, SOLUTION INTRAVENOUS
Status: DISCONTINUED | OUTPATIENT
Start: 2022-11-30 | End: 2022-11-30 | Stop reason: HOSPADM

## 2022-11-30 RX ORDER — SODIUM CHLORIDE, SODIUM LACTATE, POTASSIUM CHLORIDE, CALCIUM CHLORIDE 600; 310; 30; 20 MG/100ML; MG/100ML; MG/100ML; MG/100ML
25 INJECTION, SOLUTION INTRAVENOUS CONTINUOUS
Status: DISCONTINUED | OUTPATIENT
Start: 2022-11-30 | End: 2022-11-30 | Stop reason: HOSPADM

## 2022-11-30 RX ORDER — SODIUM CHLORIDE 0.9 % (FLUSH) 0.9 %
5-40 SYRINGE (ML) INJECTION EVERY 8 HOURS
Status: DISCONTINUED | OUTPATIENT
Start: 2022-11-30 | End: 2022-11-30 | Stop reason: HOSPADM

## 2022-11-30 RX ORDER — FENTANYL CITRATE 50 UG/ML
INJECTION, SOLUTION INTRAMUSCULAR; INTRAVENOUS AS NEEDED
Status: DISCONTINUED | OUTPATIENT
Start: 2022-11-30 | End: 2022-11-30 | Stop reason: HOSPADM

## 2022-11-30 RX ORDER — SUCCINYLCHOLINE CHLORIDE 20 MG/ML
INJECTION INTRAMUSCULAR; INTRAVENOUS AS NEEDED
Status: DISCONTINUED | OUTPATIENT
Start: 2022-11-30 | End: 2022-11-30 | Stop reason: HOSPADM

## 2022-11-30 RX ORDER — AMLODIPINE BESYLATE 5 MG/1
5 TABLET ORAL DAILY
Status: DISCONTINUED | OUTPATIENT
Start: 2022-12-01 | End: 2022-12-01 | Stop reason: HOSPADM

## 2022-11-30 RX ORDER — MAGNESIUM SULFATE 100 %
4 CRYSTALS MISCELLANEOUS AS NEEDED
Status: DISCONTINUED | OUTPATIENT
Start: 2022-11-30 | End: 2022-11-30 | Stop reason: HOSPADM

## 2022-11-30 RX ORDER — SCOLOPAMINE TRANSDERMAL SYSTEM 1 MG/1
1 PATCH, EXTENDED RELEASE TRANSDERMAL
Status: DISCONTINUED | OUTPATIENT
Start: 2022-11-30 | End: 2022-12-01 | Stop reason: HOSPADM

## 2022-11-30 RX ORDER — ONDANSETRON 2 MG/ML
4 INJECTION INTRAMUSCULAR; INTRAVENOUS
Status: DISCONTINUED | OUTPATIENT
Start: 2022-11-30 | End: 2022-12-01 | Stop reason: HOSPADM

## 2022-11-30 RX ORDER — NALOXONE HYDROCHLORIDE 0.4 MG/ML
0.4 INJECTION, SOLUTION INTRAMUSCULAR; INTRAVENOUS; SUBCUTANEOUS AS NEEDED
Status: DISCONTINUED | OUTPATIENT
Start: 2022-11-30 | End: 2022-12-01 | Stop reason: HOSPADM

## 2022-11-30 RX ORDER — MIDAZOLAM HYDROCHLORIDE 1 MG/ML
INJECTION, SOLUTION INTRAMUSCULAR; INTRAVENOUS AS NEEDED
Status: DISCONTINUED | OUTPATIENT
Start: 2022-11-30 | End: 2022-11-30 | Stop reason: HOSPADM

## 2022-11-30 RX ORDER — SODIUM CHLORIDE 0.9 % (FLUSH) 0.9 %
5-40 SYRINGE (ML) INJECTION AS NEEDED
Status: DISCONTINUED | OUTPATIENT
Start: 2022-11-30 | End: 2022-11-30 | Stop reason: HOSPADM

## 2022-11-30 RX ORDER — SODIUM CHLORIDE, SODIUM LACTATE, POTASSIUM CHLORIDE, CALCIUM CHLORIDE 600; 310; 30; 20 MG/100ML; MG/100ML; MG/100ML; MG/100ML
125 INJECTION, SOLUTION INTRAVENOUS CONTINUOUS
Status: DISCONTINUED | OUTPATIENT
Start: 2022-11-30 | End: 2022-12-01 | Stop reason: HOSPADM

## 2022-11-30 RX ORDER — KETOROLAC TROMETHAMINE 15 MG/ML
15 INJECTION, SOLUTION INTRAMUSCULAR; INTRAVENOUS EVERY 6 HOURS
Status: DISCONTINUED | OUTPATIENT
Start: 2022-11-30 | End: 2022-12-01 | Stop reason: HOSPADM

## 2022-11-30 RX ORDER — PROMETHAZINE HYDROCHLORIDE 12.5 MG/1
12.5 TABLET ORAL
Status: DISCONTINUED | OUTPATIENT
Start: 2022-11-30 | End: 2022-12-01 | Stop reason: HOSPADM

## 2022-11-30 RX ORDER — ONDANSETRON 2 MG/ML
INJECTION INTRAMUSCULAR; INTRAVENOUS AS NEEDED
Status: DISCONTINUED | OUTPATIENT
Start: 2022-11-30 | End: 2022-11-30 | Stop reason: HOSPADM

## 2022-11-30 RX ORDER — HYDROMORPHONE HYDROCHLORIDE 2 MG/ML
INJECTION, SOLUTION INTRAMUSCULAR; INTRAVENOUS; SUBCUTANEOUS AS NEEDED
Status: DISCONTINUED | OUTPATIENT
Start: 2022-11-30 | End: 2022-11-30 | Stop reason: HOSPADM

## 2022-11-30 RX ORDER — HYDROMORPHONE HYDROCHLORIDE 1 MG/ML
0.2 INJECTION, SOLUTION INTRAMUSCULAR; INTRAVENOUS; SUBCUTANEOUS AS NEEDED
Status: DISCONTINUED | OUTPATIENT
Start: 2022-11-30 | End: 2022-11-30 | Stop reason: HOSPADM

## 2022-11-30 RX ORDER — LIDOCAINE HYDROCHLORIDE 20 MG/ML
INJECTION, SOLUTION EPIDURAL; INFILTRATION; INTRACAUDAL; PERINEURAL AS NEEDED
Status: DISCONTINUED | OUTPATIENT
Start: 2022-11-30 | End: 2022-11-30 | Stop reason: HOSPADM

## 2022-11-30 RX ORDER — APREPITANT 40 MG/1
80 CAPSULE ORAL ONCE
Status: COMPLETED | OUTPATIENT
Start: 2022-11-30 | End: 2022-11-30

## 2022-11-30 RX ORDER — ENOXAPARIN SODIUM 100 MG/ML
40 INJECTION SUBCUTANEOUS EVERY 24 HOURS
Status: DISCONTINUED | OUTPATIENT
Start: 2022-12-01 | End: 2022-12-01 | Stop reason: HOSPADM

## 2022-11-30 RX ORDER — ACETAMINOPHEN 325 MG/1
650 TABLET ORAL EVERY 6 HOURS
Status: DISCONTINUED | OUTPATIENT
Start: 2022-11-30 | End: 2022-12-01 | Stop reason: HOSPADM

## 2022-11-30 RX ORDER — LIDOCAINE HYDROCHLORIDE 10 MG/ML
0.1 INJECTION, SOLUTION EPIDURAL; INFILTRATION; INTRACAUDAL; PERINEURAL AS NEEDED
Status: DISCONTINUED | OUTPATIENT
Start: 2022-11-30 | End: 2022-11-30 | Stop reason: HOSPADM

## 2022-11-30 RX ORDER — CEFOXITIN 1 G/1
2 INJECTION, POWDER, FOR SOLUTION INTRAVENOUS ONCE
Status: DISCONTINUED | OUTPATIENT
Start: 2022-11-30 | End: 2022-11-30 | Stop reason: CLARIF

## 2022-11-30 RX ORDER — ENOXAPARIN SODIUM 100 MG/ML
40 INJECTION SUBCUTANEOUS ONCE
Status: COMPLETED | OUTPATIENT
Start: 2022-11-30 | End: 2022-11-30

## 2022-11-30 RX ORDER — ROCURONIUM BROMIDE 10 MG/ML
INJECTION, SOLUTION INTRAVENOUS AS NEEDED
Status: DISCONTINUED | OUTPATIENT
Start: 2022-11-30 | End: 2022-11-30 | Stop reason: HOSPADM

## 2022-11-30 RX ORDER — PROPOFOL 10 MG/ML
INJECTION, EMULSION INTRAVENOUS AS NEEDED
Status: DISCONTINUED | OUTPATIENT
Start: 2022-11-30 | End: 2022-11-30 | Stop reason: HOSPADM

## 2022-11-30 RX ORDER — INSULIN LISPRO 100 [IU]/ML
INJECTION, SOLUTION INTRAVENOUS; SUBCUTANEOUS ONCE
Status: DISCONTINUED | OUTPATIENT
Start: 2022-11-30 | End: 2022-11-30 | Stop reason: HOSPADM

## 2022-11-30 RX ORDER — OXYCODONE HYDROCHLORIDE 5 MG/1
5 TABLET ORAL
Status: DISCONTINUED | OUTPATIENT
Start: 2022-11-30 | End: 2022-12-01 | Stop reason: HOSPADM

## 2022-11-30 RX ORDER — METOCLOPRAMIDE HYDROCHLORIDE 5 MG/ML
5 INJECTION INTRAMUSCULAR; INTRAVENOUS
Status: DISCONTINUED | OUTPATIENT
Start: 2022-11-30 | End: 2022-12-01 | Stop reason: HOSPADM

## 2022-11-30 RX ORDER — DEXAMETHASONE SODIUM PHOSPHATE 4 MG/ML
INJECTION, SOLUTION INTRA-ARTICULAR; INTRALESIONAL; INTRAMUSCULAR; INTRAVENOUS; SOFT TISSUE AS NEEDED
Status: DISCONTINUED | OUTPATIENT
Start: 2022-11-30 | End: 2022-11-30 | Stop reason: HOSPADM

## 2022-11-30 RX ADMIN — ROCURONIUM BROMIDE 10 MG: 10 INJECTION, SOLUTION INTRAVENOUS at 12:07

## 2022-11-30 RX ADMIN — MIDAZOLAM HYDROCHLORIDE 2 MG: 1 INJECTION, SOLUTION INTRAMUSCULAR; INTRAVENOUS at 11:57

## 2022-11-30 RX ADMIN — PROPOFOL 200 MG: 10 INJECTION, EMULSION INTRAVENOUS at 12:07

## 2022-11-30 RX ADMIN — OXYCODONE HYDROCHLORIDE 5 MG: 5 TABLET ORAL at 21:38

## 2022-11-30 RX ADMIN — ROCURONIUM BROMIDE 5 MG: 10 INJECTION, SOLUTION INTRAVENOUS at 14:09

## 2022-11-30 RX ADMIN — FAMOTIDINE 20 MG: 10 INJECTION, SOLUTION INTRAVENOUS at 11:02

## 2022-11-30 RX ADMIN — SODIUM CHLORIDE, SODIUM LACTATE, POTASSIUM CHLORIDE, CALCIUM CHLORIDE: 600; 310; 30; 20 INJECTION, SOLUTION INTRAVENOUS at 11:57

## 2022-11-30 RX ADMIN — ROCURONIUM BROMIDE 10 MG: 10 INJECTION, SOLUTION INTRAVENOUS at 13:00

## 2022-11-30 RX ADMIN — ENOXAPARIN SODIUM 40 MG: 100 INJECTION SUBCUTANEOUS at 11:02

## 2022-11-30 RX ADMIN — CEFOXITIN 2 G: 2 INJECTION, POWDER, FOR SOLUTION INTRAVENOUS at 12:12

## 2022-11-30 RX ADMIN — LIDOCAINE HYDROCHLORIDE 60 MG: 20 INJECTION, SOLUTION EPIDURAL; INFILTRATION; INTRACAUDAL; PERINEURAL at 12:07

## 2022-11-30 RX ADMIN — FENTANYL CITRATE 50 MCG: 50 INJECTION, SOLUTION INTRAMUSCULAR; INTRAVENOUS at 12:07

## 2022-11-30 RX ADMIN — ACETAMINOPHEN 650 MG: 325 TABLET, FILM COATED ORAL at 17:11

## 2022-11-30 RX ADMIN — ACETAMINOPHEN 650 MG: 325 TABLET, FILM COATED ORAL at 21:38

## 2022-11-30 RX ADMIN — ROCURONIUM BROMIDE 30 MG: 10 INJECTION, SOLUTION INTRAVENOUS at 12:20

## 2022-11-30 RX ADMIN — HYDROMORPHONE HYDROCHLORIDE 0.2 MG: 2 INJECTION, SOLUTION INTRAMUSCULAR; INTRAVENOUS; SUBCUTANEOUS at 14:00

## 2022-11-30 RX ADMIN — SUCCINYLCHOLINE CHLORIDE 100 MG: 20 INJECTION INTRAMUSCULAR; INTRAVENOUS at 12:08

## 2022-11-30 RX ADMIN — OXYCODONE HYDROCHLORIDE 5 MG: 5 TABLET ORAL at 17:11

## 2022-11-30 RX ADMIN — KETOROLAC TROMETHAMINE 15 MG: 15 INJECTION, SOLUTION INTRAMUSCULAR; INTRAVENOUS at 18:29

## 2022-11-30 RX ADMIN — ROCURONIUM BROMIDE 10 MG: 10 INJECTION, SOLUTION INTRAVENOUS at 13:25

## 2022-11-30 RX ADMIN — APREPITANT 80 MG: 40 CAPSULE ORAL at 10:58

## 2022-11-30 RX ADMIN — ONDANSETRON 4 MG: 2 INJECTION INTRAMUSCULAR; INTRAVENOUS at 14:20

## 2022-11-30 RX ADMIN — SODIUM CHLORIDE, POTASSIUM CHLORIDE, SODIUM LACTATE AND CALCIUM CHLORIDE 125 ML/HR: 600; 310; 30; 20 INJECTION, SOLUTION INTRAVENOUS at 21:42

## 2022-11-30 RX ADMIN — KETOROLAC TROMETHAMINE 15 MG: 15 INJECTION, SOLUTION INTRAMUSCULAR; INTRAVENOUS at 23:19

## 2022-11-30 RX ADMIN — DEXAMETHASONE SODIUM PHOSPHATE 8 MG: 4 INJECTION, SOLUTION INTRA-ARTICULAR; INTRALESIONAL; INTRAMUSCULAR; INTRAVENOUS; SOFT TISSUE at 12:18

## 2022-11-30 RX ADMIN — FENTANYL CITRATE 50 MCG: 50 INJECTION, SOLUTION INTRAMUSCULAR; INTRAVENOUS at 12:20

## 2022-11-30 RX ADMIN — HYDROMORPHONE HYDROCHLORIDE 0.2 MG: 2 INJECTION, SOLUTION INTRAMUSCULAR; INTRAVENOUS; SUBCUTANEOUS at 13:50

## 2022-11-30 NOTE — ANESTHESIA PREPROCEDURE EVALUATION
Relevant Problems   ENDOCRINE   (+) Morbid obesity ([facility] [address]       Anesthetic History   No history of anesthetic complications            Review of Systems / Medical History  Patient summary reviewed and pertinent labs reviewed    Pulmonary        Sleep apnea           Neuro/Psych   Within defined limits           Cardiovascular    Hypertension              Exercise tolerance: >4 METS     GI/Hepatic/Renal  Within defined limits              Endo/Other        Morbid obesity and anemia     Other Findings              Physical Exam    Airway  Mallampati: II  TM Distance: > 6 cm  Neck ROM: normal range of motion   Mouth opening: Normal     Cardiovascular  Regular rate and rhythm,  S1 and S2 normal,  no murmur, click, rub, or gallop             Dental  No notable dental hx       Pulmonary  Breath sounds clear to auscultation               Abdominal  GI exam deferred       Other Findings            Anesthetic Plan    ASA: 3  Anesthesia type: general          Induction: Intravenous  Anesthetic plan and risks discussed with: Patient

## 2022-11-30 NOTE — INTERVAL H&P NOTE
Update History & Physical    The Patient's History and Physical of November 17, 2022 was reviewed with the patient and I examined the patient. There was no change. The surgical site was confirmed by the patient and me. Plan:  The risk, benefits, expected outcome, and alternative to the recommended procedure have been discussed with the patient. Patient understands and wants to proceed with the procedure.     Electronically signed by Shyanne Bailon MD on 11/30/2022 at 11:12 AM

## 2022-11-30 NOTE — PROGRESS NOTES
Ketorolac dose limited to 15 mg per [facility] policy

## 2022-11-30 NOTE — ROUTINE PROCESS
TRANSFER - IN REPORT:    Verbal report received from JOB Elgin (name) on Americo Ren  being received from PACU (unit) for routine post - op      Report consisted of patients Situation, Background, Assessment and   Recommendations(SBAR). Information from the following report(s) SBAR, OR Summary, Intake/Output, MAR, Recent Results, and Cardiac Rhythm NSR  was reviewed with the receiving nurse. Opportunity for questions and clarification was provided. Assessment completed upon patients arrival to unit and care assumed.

## 2022-11-30 NOTE — OP NOTES
OPERATIVE REPORT     Minor Pires   : 1985  Medical Record [id number]    Pre-operative Diagnosis:  Morbid obesity ([address] [E66.01]  Gastroesophageal reflux disease, unspecified whether esophagitis present [K21.9]  Essential hypertension [I10]                  Post-operative Diagnosis: Same                 Procedure:   Laparoscopic Melyssa-en-Y gastric bypass, 100 cm Melyssa limb  Upper endoscopy  Liver wedge biopsy  Bilateral Exparel TAP blocks    Location: Hospital: [facility]                  Surgeon: Sujata Smith MD                  Assistant:   Sury Lopez    Anesthesia: General     Specimen:  Liver wedge biopsy    EBL: less than 10 cc    Complications: none      STATEMENT OF MEDICAL NECESSITY: The patient is a 40y.o.-year-old  female who has had a long-standing history of obesity. she has a preoperative BMI of 49 and obesity related comorbidities, including GERD and hypertension. She has undergone preoperative evaluation and was felt to be a reasonable candidate for surgery, and has elected for Melyssa-en-y Gastric Bypass. I explained to the patient the risks associated with this procedure and she understood all this very clearly and did wish to proceed with operative intervention at this time period. OPERATIVE PROCEDURE: The patient was brought to the operating room, placed on the table in the split leg position at which time period general anesthesia was administered without any difficulty. The abdomen was then prepped and draped in the usual sterile fashion. A 1 cm incision was made just to the left of the midline at the level of the umbilicus. Access to the abdominal cavity was obtained using a 12 mm Optiview technique. The abdomen was insufflated and inspected for entry injuries of which there were none. Additional 12 mm ports were placed in the left and right upper quadrants, and 12 mm assist ports were placed in the left and right flanks.  Bilateral TAP blocks were performed using a mixture of 0.25% Marcaine and Exparel under direct visualization. The greater omentum was mobilized cephalad and the transverse mesocolon retracted anteriorly, exposing the ligament of Treitz. The proximal jejunum was measured for 50 cm distally and a window was created in the mesentery, allowing it to be transected at this point with a white load. This would become the distal BP limb. The mesentery was divided further toward the root using ultrasonic energy to mobilize the Melyssa limb. The Melyssa limb was then run a total of 110 cm distally, and an enterotomy was made at the antimesenteric border here, as well as at the distal BP limb. A side to side jejunojejunostomy was created with a single firing of a 60 mm white load stapler. The common enterotomy was closed using a running 2-0 silk suture. The mesenteric defect was exposed at its base and closed in a running fashion with a 2-0 silk. This was run toward the anastomosis, imbricating over the blind end of the BP limb. There was some slight ischemia of the terminal BP limb as the mesentery was undermined a little bit, but these areas of ischemia were oversewn with the mesenteric defect closure. All limbs were run away from the anastomosis to confirm correct anatomy. At the conclusion of the case I reexamined this area to ensure there was no worsening in ischemia and the anastomosis appeared well perfused. The greater omentum was then divided to the level of the transverse colon to allow minimal tension on the antecolic Melyssa limb. I took a wedge liver biopsy at this point from the inferior edge of the left lobe using the harmonic device. I used some monopolar energy to cauterize the friable liver parenchyma. Hemostasis was excellent and the specimen was removed. The patient was then repositioned in steep reverse [place] and a liver retractor placed through an epigastric port site. The left lobe of the liver was swept medially.  The hiatus was inspected, and no hiatal hernia was identified. The proximal fundus was retracted laterally and the Angle of His was dissected free until retroperitoneal fat was visible. I then measured 5 cm from the GEJ and began my perigastric dissection at the lesser curve here. Ultrasonic energy was used to take down some of the smaller vessels to aid in hemostasis. A window to the lesser sac was made, and the first firing of the gastric pouch was made with a 60 mm blue load. I made two gastrotomies, one in the pouch staple line, and one on the gastric body. I replaced the left upper quadrant port with a balloon tip port fashioned with a 25 mm EEA anvil, and introduced the anvil into the remnant stomach and through the pouch gastrotomy using an articulating [place] device. The gastric body gastrotomy was closed using a reinforced blue load. I completed the lateral staple line using two additional firings of a 60 mm blue load stapler. It was inspected for hemostasis which was adequate. I brought the Melyssa limb back into the field. An enterotomy was made at the distal Melyssa limb and the EEA was introduced through the left upper quadrant incision, and telescoped over the Melyssa limb. The spike was deployed and mated with the anvil. I took care to avoid kinking the Melyssa limb while firing the stapler. After firing, the stapler was carefully removed and inspected for two fully formed donuts. The mesentery of the excess Melyssa limb was taken down slightly and the bowel transected flush with the gastric pouch with a 60 mm white load stapler. I reinforced the lateral aspects of the GJ with subserosal 3-0 Vicryl sutures. There were 2 small areas of the proximal Melyssa limb with serosal tears from manipulation during creation of the [address] [place] [address], and these were oversewn longitudinally with 3-0 Vicryl single Lembert stitches. I then repositioned the patient supine and exposed Ibrahim's defect.  This was closed in a posterior to anterior fashion using a running 2-0 silk suture. All 12 mm port sites were closed using 0 PDS in a figure of eight pattern with a suture passing device. An endoscope was introduced into the esophagus and passed carefully into the pouch. The Melyssa limb was clamped and the upper abdomen flooded. No leak was appreciated. The pouch was appropriately sized without endoscopic evidence of bleeding. The pouch was desufflated and endoscope withdrawn. The abdomen was irrigated and desufflated. The left upper quadrant port site was copiously irrigated. Skin of all port sites was closed using 4-0 Monocryl and Steri strips to the closed skin incisions. At the conclusion of the case, all lap, sponge, instrument counts were correct. The patient was awoken from general anesthesia uneventfully. I was scrubbed for all portions of the procedure.       Dylan Amaya MD, FACS, [facility]

## 2022-11-30 NOTE — ANESTHESIA POSTPROCEDURE EVALUATION
Procedure(s):  LAPAROSCOPIC PRATEEK-EN-Y GASTRIC BYPASS WITH LIVER WEDGE BIOPSY, ESOPHAGASTRODUODENOSCOPY (EGD). general    Anesthesia Post Evaluation      Multimodal analgesia: multimodal analgesia used between 6 hours prior to anesthesia start to PACU discharge  Patient location during evaluation: PACU  Patient participation: complete - patient participated  Level of consciousness: sleepy but conscious  Pain management: adequate  Airway patency: patent  Anesthetic complications: no  Cardiovascular status: acceptable  Respiratory status: acceptable  Hydration status: acceptable  Post anesthesia nausea and vomiting:  controlled  Final Post Anesthesia Temperature Assessment:  Normothermia (36.0-37.5 degrees C)      INITIAL Post-op Vital signs:   Vitals Value Taken Time   /79 11/30/22 1455   Temp 36.6 °C (97.9 °F) 11/30/22 1455   Pulse 89 11/30/22 1456   Resp 18 11/30/22 1456   SpO2 100 % 11/30/22 1455   Vitals shown include unvalidated device data.

## 2022-11-30 NOTE — PERIOP NOTES
TRANSFER - OUT REPORT:    Verbal report given to Sobeida(name) on Fatemeh Adams  being transferred to 2 surgical(unit) for routine post - op       Report consisted of patients Situation, Background, Assessment and   Recommendations(SBAR). Information from the following report(s) SBAR, OR Summary, Intake/Output, MAR, Recent Results, Cardiac Rhythm NSR, and Alarm Parameters  was reviewed with the receiving nurse. Lines:   Peripheral IV 11/30/22 Posterior;Proximal;Right Forearm (Active)   Site Assessment Clean, dry, & intact 11/30/22 1455   Phlebitis Assessment 0 11/30/22 1455   Infiltration Assessment 0 11/30/22 1455   Dressing Status Clean, dry, & intact 11/30/22 1455   Dressing Type Tape;Transparent 11/30/22 1455   Hub Color/Line Status Pink; Infusing;Patent 11/30/22 1455   Alcohol Cap Used No 11/30/22 1056        Opportunity for questions and clarification was provided.       Patient transported with:   [employer]

## 2022-12-01 VITALS
WEIGHT: 250 LBS | HEART RATE: 101 BPM | BODY MASS INDEX: 49.08 KG/M2 | DIASTOLIC BLOOD PRESSURE: 80 MMHG | HEIGHT: 60 IN | TEMPERATURE: 97.8 F | OXYGEN SATURATION: 93 % | RESPIRATION RATE: 16 BRPM | SYSTOLIC BLOOD PRESSURE: 143 MMHG

## 2022-12-01 LAB
ANION GAP SERPL CALC-SCNC: 9 MMOL/L (ref 3–18)
BUN SERPL-MCNC: 11 MG/DL (ref 7–18)
BUN/CREAT SERPL: 11 (ref 12–20)
CALCIUM SERPL-MCNC: 9.2 MG/DL (ref 8.5–10.1)
CHLORIDE SERPL-SCNC: 99 MMOL/L (ref 100–111)
CO2 SERPL-SCNC: 26 MMOL/L (ref 21–32)
CREAT SERPL-MCNC: 1.04 MG/DL (ref 0.6–1.3)
ERYTHROCYTE [DISTWIDTH] IN BLOOD BY AUTOMATED COUNT: 14.5 % (ref 11.6–14.5)
GLUCOSE SERPL-MCNC: 138 MG/DL (ref 74–99)
HCT VFR BLD AUTO: 37.5 % (ref 35–45)
HGB BLD-MCNC: 12.1 G/DL (ref 12–16)
MAGNESIUM SERPL-MCNC: 1.7 MG/DL (ref 1.6–2.6)
MCH RBC QN AUTO: 26.7 PG (ref 24–34)
MCHC RBC AUTO-ENTMCNC: 32.3 G/DL (ref 31–37)
MCV RBC AUTO: 82.8 FL (ref 78–100)
NRBC # BLD: 0 K/UL (ref 0–0.01)
NRBC BLD-RTO: 0 PER 100 WBC
PLATELET # BLD AUTO: 473 K/UL (ref 135–420)
PMV BLD AUTO: 9.1 FL (ref 9.2–11.8)
POTASSIUM SERPL-SCNC: 4.4 MMOL/L (ref 3.5–5.5)
RBC # BLD AUTO: 4.53 M/UL (ref 4.2–5.3)
SODIUM SERPL-SCNC: 134 MMOL/L (ref 136–145)
WBC # BLD AUTO: 19.5 K/UL (ref 4.6–13.2)

## 2022-12-01 PROCEDURE — 83735 ASSAY OF MAGNESIUM: CPT

## 2022-12-01 PROCEDURE — 74011000250 HC RX REV CODE- 250: Performed by: STUDENT IN AN ORGANIZED HEALTH CARE EDUCATION/TRAINING PROGRAM

## 2022-12-01 PROCEDURE — 80048 BASIC METABOLIC PNL TOTAL CA: CPT

## 2022-12-01 PROCEDURE — 74011250636 HC RX REV CODE- 250/636: Performed by: STUDENT IN AN ORGANIZED HEALTH CARE EDUCATION/TRAINING PROGRAM

## 2022-12-01 PROCEDURE — 85027 COMPLETE CBC AUTOMATED: CPT

## 2022-12-01 PROCEDURE — 74011250637 HC RX REV CODE- 250/637: Performed by: STUDENT IN AN ORGANIZED HEALTH CARE EDUCATION/TRAINING PROGRAM

## 2022-12-01 PROCEDURE — 36415 COLL VENOUS BLD VENIPUNCTURE: CPT

## 2022-12-01 PROCEDURE — C9113 INJ PANTOPRAZOLE SODIUM, VIA: HCPCS | Performed by: STUDENT IN AN ORGANIZED HEALTH CARE EDUCATION/TRAINING PROGRAM

## 2022-12-01 RX ORDER — ONDANSETRON 4 MG/1
4 TABLET, ORALLY DISINTEGRATING ORAL
Qty: 12 TABLET | Refills: 0 | Status: SHIPPED | OUTPATIENT
Start: 2022-12-01

## 2022-12-01 RX ORDER — OXYCODONE HYDROCHLORIDE 5 MG/1
5 TABLET ORAL
Qty: 10 TABLET | Refills: 0 | Status: SHIPPED | OUTPATIENT
Start: 2022-12-01 | End: 2022-12-04

## 2022-12-01 RX ORDER — ENOXAPARIN SODIUM 100 MG/ML
40 INJECTION SUBCUTANEOUS DAILY
Qty: 7 EACH | Refills: 0 | Status: SHIPPED | OUTPATIENT
Start: 2022-12-01

## 2022-12-01 RX ADMIN — AMLODIPINE BESYLATE 5 MG: 5 TABLET ORAL at 09:37

## 2022-12-01 RX ADMIN — SODIUM CHLORIDE 40 MG: 9 INJECTION, SOLUTION INTRAMUSCULAR; INTRAVENOUS; SUBCUTANEOUS at 09:37

## 2022-12-01 RX ADMIN — ENOXAPARIN SODIUM 40 MG: 100 INJECTION SUBCUTANEOUS at 09:37

## 2022-12-01 RX ADMIN — OXYCODONE HYDROCHLORIDE 5 MG: 5 TABLET ORAL at 09:37

## 2022-12-01 RX ADMIN — ACETAMINOPHEN 650 MG: 325 TABLET, FILM COATED ORAL at 09:37

## 2022-12-01 RX ADMIN — KETOROLAC TROMETHAMINE 15 MG: 15 INJECTION, SOLUTION INTRAMUSCULAR; INTRAVENOUS at 12:13

## 2022-12-01 RX ADMIN — KETOROLAC TROMETHAMINE 15 MG: 15 INJECTION, SOLUTION INTRAMUSCULAR; INTRAVENOUS at 06:31

## 2022-12-01 RX ADMIN — ACETAMINOPHEN 650 MG: 325 TABLET, FILM COATED ORAL at 04:22

## 2022-12-01 NOTE — PROGRESS NOTES
Problem: Falls - Risk of  Goal: *Absence of Falls  Description: Document [place] Fall Risk and appropriate interventions in the flowsheet.   Outcome: Progressing Towards Goal  Note: Fall Risk Interventions:            Medication Interventions: Patient to call before getting OOB, Teach patient to arise slowly                   Problem: Patient Education: Go to Patient Education Activity  Goal: Patient/Family Education  Outcome: Progressing Towards Goal

## 2022-12-01 NOTE — ACP (ADVANCE CARE PLANNING)
Advance Care Planning   Advance Care Planning Inpatient Note  [address] [facility] Department    Today's Date: 12/1/2022  Unit: [facility] 2 SURGICAL    Received request from rounding. Upon review of chart and communication with care team, patient's decision making abilities are not in question. Patient and  her mother were present in the room during visit. Goals of ACP Conversation:  Discuss Advance Care planning documents  Facilitate a discussion related to patient's goals of care as they align with the patient's values and beliefs    Health Care Decision Makers:      Primary Decision Maker: Binh Berumen Spouse - [phone]    Secondary Decision Maker: Yamilka Woodard - Parent - [phone]    Summary:  Completed [facility]  Documented Next of Kin, per patient report  Patient named Jass Richardson (spouse) from [address], [address] as the primary decision maker and Stacey Torres (mother) from [address], [address] as the secondary decision maker. Advance Care Planning Documents (Patient Wishes) on file:  Healthcare Power of /Advance Directive appointment of Health care agent  Living Will/ Advance Directive   Patient expressed her wishes to be given life support to prolong her life in the event her death becomes imminent and in the event she loses her awareness of herself, others, and unable to communicate her needs. Patient made it clear that she is not an organ donor.     Assessment:    Patient's ability to make healthcare decisions is not in question at the time this AMD was completed and done so in the presence of her mother, Stacey Torres.   Interventions:  Provided education on documents for clarity and greater understanding  Discussed and provided education on state decision maker hierarchy  Assisted in the completion of documents according to patient's wishes at this time  Encouraged ongoing ACP conversation with future decision makers and loved ones    Care Preferences Communicated:  No    Outcomes/Plan:  ACP Discussion Completed  New Advance Directive completed  Returned original document(s) to patient, as well as copies for distribution to appointed agents  Copy of Advance Directive given to staff to scan into medical record  Routed ACP note to attending provider or other IDT member  Teach Back Method used to verify the patient/Healthcare Decision Maker's understanding of key information in the advance directive documents    Latricia [facility] on 12/1/2022 at 11:44 AM

## 2022-12-01 NOTE — ROUTINE PROCESS
Patient was educated on all discharge instructions below. He/she understood and was provided a copy. He/she knows who to call for any issues post discharge. Hydration  Hydration is your NUMBER ONE priority. Dehydration is the most common reason for readmission to the hospital. Dehydration occurs when  your body does not get enough fluid to keep it functioning at its best. Your body also requires fluid  to burn its stored fat calories for energy. Carry a bottle of water with you all day, even when you are away from home; remind yourself to  drink even if you dont feel thirsty. Drinking 64 ounces of fluid is your daily goal. You can tell if  youre getting enough fluid if youre making clear, light-colored urine five to 10 times per day. Signs of dehydration can be thirst, headache, hard stools or dizziness upon sitting or standing up. You should contact your surgeons office if you are unable to drink enough fluid to stay hydrated. --   [facility] after Surgery   No lifting over 15 pounds for four weeks. No driving while taking the pain medication (about seven to 10 days). No tub baths, swimming or hot tubs until incisions are healed (about two weeks). You may shower. Clean incisions daily /gently with soap and check incisions for signs of infection:  -- Redness around incision. -- Swelling at site. -- Drainage with an foul odor (pus). -- Increase tenderness around incision. Take your temperature and resting pulse in the morning and evening. Record on tracking form  given to you. Call if your temperature is greater than 101 or your pulse rate is greater than 115. Please contact your surgeon if you are having excessive abdominal pain (that lasts longer than  four hours and does not improve with prescribed pain medication), vomiting or shortness of breath. Get up and move -- do not sit in one place for more than an hour.    You need to WALK (EXERCISE) for 30 minutes per day. -- Walking around your house does not count. -- Bike, treadmill and elliptical are OK. -- NO weight lifting or sit ups. If constipated take an adult dose of Miralax (available over the counter). Contact the doctors  office if Miralax doesnt help. You may swallow pills starting the day after surgery as long as they fit inside this [place]:   Continue to use your incentive spirometer (breathing machine) for the next couple of weeks to  help prevent pneumonia. [address]  Temperature/Heart Rate Log  Take your temperature and heart rate (pulse) twice a day for 14 days. Take both in the morning and  evening at about the same time each day (when you wake up and before you go to bed when you  are relaxed). Please contact your doctors office if your:   Temperature is higher than 101 degrees. Heart rate (pulse) is higher than 115 beats per minute  (normal heart rate is 60 to 100 beats per minute). How to Take Your Heart Rate (Pulse)   Turn your left hand so that your palm is face-up. With the index and middle fingers of your right  hand, draw a line from the base of your thumb to  just below the crease in your wrist. Your fingers  should nestle just to the left of the large tendon that  pops up when you bend your wrist toward you. Dont press too hard, that will make the pulse go  away. Use gentle pressure. Wait. It can take several seconds -- and several micro-adjustments in the placement of your two  fingers on your wrist -- to find your pulse. Just keep moving your fingers down or up your wrist  in small increments (and pausing for a few seconds) until you find it. How to Take Your Pulse Rate   Find a watch with a second hand and place it on your right wrist or on the table next  to your left hand. After finding your pulse, count the number of beats for 20 seconds.    Multiply by three to get your heart rate, or beats per minute  (or just count for 60 seconds for a math-free option). Normal, resting heart rate is about 60 to 100 beats per minute. Lovenox Self Injection Guide  Prepare  Step 1: Wash and dry your hands thoroughly. Step 2: Sit or lie in a comfortable position and choose an area  on the right or left side of the abdomen at least two inches away  from the belly button. Step 3: Clean the injection site with an alcohol swab and let dry. Inject  Step 4: Remove the needle cap by pulling it straight off the syringe and  discard it in a sharps . Step 5: With your other hand, pinch an inch of the cleansed area to  make a fold in the skin. Insert the full length of the needle straight  down -- at a 90? angle -- into the fold of skin. [address] [place]  Step 6: Press the plunger with your thumb until the syringe is empty. Then pull the needle straight out and release the skin fold. Dispose  Step 7: Point the needle down and away from yourself and others,  and then push down on the plunger to activate the safety shield. Step 8: Place the used syringe in the sharps . Do NOT expel the air bubble from the syringe before the injection. Administration should be alternated between the left and right abdominal wall. The whole length  of the needle should be introduced into a skin fold held between the thumb and forefinger; the  skin fold should be held throughout the injection. To minimize bruising, do not rub the injection  site after completion of the injection. Questions about LOVENOX? Call [phone]    9. DIET AND LIFESTYLE  Key Diet Principles Following Bariatric Surgery   Begin each meal with soft moist high protein foods (i.e. chicken, turkey, yogurt, tuna, eggs,  cottage cheese, other fish and seafood). Consume a minimum of 64 ounces of fluid each day to prevent dehydration. No straws. No food and fluid together.  Stop drinking 30 minutes before a meal. You may begin fluids again  30 minutes after you finish a meal.   Eat very slowly and chew all foods completely. Keep portions small. No simple sugars or high fat foods. No carbonated beverages. No caffeine. Eat three meals per day. No skipping. Avoid snacking between meals. No alcohol. No smoking. Two Flintstones Complete Chewable vitamins each day. Take one in the morning and one at night.   1,500 milligrams Calcium Citrate per day in separate dosages. Vitamin D 3: 5,000 IU taken per day. Vitamin B-12: Take 1,000 micrograms sublingually daily. Iron: 60 milligrams per day from Bariat[employer] Advantage. Protein supplements of your choice. Must be low sugar (0 to 3 grams), low fat (0 to 3 grams) and  provide at least 35 to 40 grams of protein each day. You need 60 to 70 grams of protein (food  and supplements) each day. Minimum of 30 minutes of physical activity daily. Do not take NSAIDS . Do not take Steroids without your surgeons permission. Your Priorities After Surgery  ? Fluid: 64 ounces of fluid per day. ? Protein: 60 to 70 grams of protein per day. ? Walk every day. Clear Liquid Diet  One week of clear liquids: minimum of 64 ounces of fluid per day. Fluid:   Zero calorie liquids. No caffeine. No carbonation. No sugary drinks. No alcohol. No straws. Food   Protein drinks. Less than 3 grams of sugar and  3 grams of fat per serving. Protein drink should provide you with  60 to 70 grams of protein. Soft Protein Diet  Five weeks of soft protein (1 ounce for soft protein, 3 ounces of yogurt/cottage cheese). Three meals per day and 1 protein shake. Protein shakes should provide you with 30 grams of  protein on the soft protein diet. Slow transition:   First week on soft protein diet -- focus on yogurt, cottage cheese, eggs, vegetarian refried beans,  black beans, kidney beans and white beans.  (NO BAKED BEANS.)   Second through fourth week on soft protein diet -- focus on yogurt, cottage cheese, eggs,  canned tuna, canned chicken, tilapia and fish (needs to be soft enough to be cut up with a fork). Fifth week on soft protein diet -- focus on yogurt, cottage cheese, eggs, canned tuna, canned  chicken, tilapia, fish, salmon, chicken breast or turkey. Fluid is your #1 Priority! Continue clear liquids between meals. You will need 64 ounces of fluid per day. Fluids that you can have include:   Water. Zero calorie liquids. You will need to sip throughout the day and should therefore have a water bottle with you at all  times! No liquids with your meals. Stop 30 minutes before a meal and wait 30 minutes after a meal.ugary drinks. No alco  Protein  You will need 60 to 70 grams of protein per day. 60 to 70 grams of protein shakes when on the clear liquid diet (two to three shakes per day). 30 to 50 grams of protein shakes when on the soft protein diet (one shake per day). Eat Three Times Per Day  You will need to eat three times per day. My planned times are:  _________________________________________________________  _________________________________________________________  _________________________________________________________  Nausea, Vomiting, Stomach Pain  If you have problems with nausea, vomiting or stomach pain, try:   Eating slowly: 20 to 30 minutes per meal.   Chewing food thoroughly: 20 to 30 chews before food is swallowed. Small portions: measure portions in medicine cup. Stopping before feeling full. AVOIDING SUGAR and FRIED FOOD: sugar will cause dumping syndrome and lead to weight gain. Exercise  I will need to get a minimum of 30 minutes of exercise per day or 150 minutes of exercise per week. Walking, swimming, biking or elliptical.   Find something you enjoy! Vitamins  After surgery, you will need to take the following vitamins for the rest of your life -- FOREVER. Vitamin D 3: 5,000 IU per day. Calcium Citrate: 1,500 milligrams, taken separately.    Flintstones Complete: two per day, taken separately. Sublingual Vitamin B-12: 1,000 micrograms daily. Iron for menstruating women or patients with a history of low iron: 65 milligrams daily. We recommend going to www.bariatricad[employer]romb[employer] Internationalage. Five9 and purchasing iron from there. The lemon-lime has 60 milligrams. This iron is better absorbed than over-the-counter iron. Clear Liquid Log  Getting your fluid in is top priority during this week. Fluids (MINIUM of 64 ounces per day):  ? 8 oz. ? 8 oz. ? 8 oz. ? 8 oz. ? 8 oz. ? 8 oz. ? 8 oz. ? 8 oz. ? 8 oz. ? 8 oz. ? 8 oz. ? 8 oz. Flintstones Complete Chewable: ? a.m. ? p.m. Calcium Citrate (1,500 milligrams/day):  Pill form  ? Two crushed pills (morning) ? Two crushed pills (afternoon) ? Two crushed pills (evening)  OR Upcal D (powder)  ? One pack/scoop ? One pack/scoop ? One pack/scoop  OR Celebrate Chewable Vitamins (500 mg each) or Bariatric Advantage Chewables (500 mg)  ? One chewable (morning) ? One chewable (afternoon) ? One chewable (evening)  OR Liquid Calcium Citrate  ? 1 tbsp. Calcium Citrate ? 1 tbsp. Calcium Citrate ? 1 tbsp. Calcium Citrate  Vitamin D3: ? 5,000 IU daily. Vitamin B-12: ? 1,000 micrograms per day. Iron (menstruating women or patients with a history of low iron):  ? 60 milligrams per day from [employer]. Protein drinks (protein drinks should be under 3 grams of sugar and 3 grams of fat). Protein shake (60 grams per day): ? a.m. ? p.m. Exercise: ? 30 to 40 minutes per day. Bariatric Soft and Moist Diet Shopping List   alcium Citrate (1,500 milligrams/day):  Pill form  ? Two crushed pills (morning) ? Two crushed pills (afternoon) ? Two crushed pills (evening)  OR Upcal D (powder)  ? One pack/scoop ? One pack/scoop ? One pack/scoop  OR Celebrate Chewable Vitamins (500 mg each) or Bariatric Advantage Chewables (500 mg)  ? One chewable (morning) ? One chewable (afternoon) ? One chewable (evening)  OR Liquid Calcium Citrate  ? 1 tbsp. Calcium Citrate ? 1 tbsp.  Calcium Citrate ? 1 tbsp. Calcium Citrate  Vitamin D3: ? 5,000 IU daily  Vitamin B-12: ? 1,000 micrograms per day  Iron (menstruating women or  patients with a history of low iron):  ? 60 milligrams per day  from Bariat[employer] Advantage  Protein drinks (protein drinks should be under  3 grams of sugar and 3 grams of fat). Protein shake (30 to 40 grams per day):  ? a.m. ? p.m. Exercise: ? 30 to 40 minutes per  Educated on Diet Progression    Jorge Alicea Gastric Bypass and Sleeve Dietary Progression    Patient Name:   Date of Surgery: Ice Chips start once admitted on floor. Begin Bariatric Clear Liquid Diet on:     Clear Liquid Diet: 64 oz. of fluid per day  Low calorie, low sugar, non-carbonated beverages  Water, Crystal Light, Propel Water, Sugar Free Jell-O, Sugar Free Popsicles, Bouillon  Start protein supplement during this stage. (60-70 grams per day)  Getting your fluid in and staying hydrated is your #1 priority! The clear liquid diet will last for 7 days. Begin Bariatric Soft and Moist on: This stage of the diet will last for 5 weeks, unless otherwise instructed by your surgeon. Begin:  1 week post-op   End:  6 weeks post-op (or when you follow up with the Registered Dietitian)    Soft, moist, high protein foods: 3 meals per day plus protein supplements. Portions should emphasize on soft protein. Portions will be a MAXIMUM of:   1 ounce of solid food   2-3 ounces of cottage cheese and yogurt. Protein supplements should be between meals and provide 30-40 grams per day during soft protein diet. Continue to get 64 ounces of fluid in per day. Protein foods that are ok on the Soft and Moist Diet include:  Slow transition:  1st week on soft protein should focus on:  Yogurt, cottage cheese, eggs  2nd -4th  week on soft protein diet should focus on: yogurt, cottage cheese, eggs, canned tuna, canned chicken, tilapia, fish (needs to be soft enough to be cut up with a fork)  5th week on soft protein diet should focus on: Yogurt, cottage cheese, eggs, canned tuna, canned chicken, tilapia, fish, salmon, chicken breast, or turkey. Remember to continue to get 64 ounces of fluid daily on ALL Stages. To be advanced to Bariatric Maintenance Stage of the bariatric diet, follow up with the dietitian 6 weeks post-op, around:         For any additional questions, please refer to your blue binder that was provided to you at the start of the bariatric program.

## 2022-12-01 NOTE — PROGRESS NOTES
Problem: Falls - Risk of  Goal: *Absence of Falls  Description: Document Sabrina Fothergill Fall Risk and appropriate interventions in the flowsheet.   Outcome: Progressing Towards Goal  Note: Fall Risk Interventions:            Medication Interventions: Teach patient to arise slowly                   Problem: Patient Education: Go to Patient Education Activity  Goal: Patient/Family Education  Outcome: Progressing Towards Goal

## 2022-12-01 NOTE — DISCHARGE SUMMARY
Bariatric Surgery Discharge Progress Note    Admission Date: 11/30/2022    Discharge Date: 12/1/2022    Pre-operative Diagnosis:  Morbid obesity ([facility] [address] [E66.01]  Gastroesophageal reflux disease, unspecified whether esophagitis present [K21.9]  Essential hypertension [I10]                  Post-operative Diagnosis: Same                 Procedure:   Laparoscopic Melyssa-en-Y gastric bypass, 100 cm Melyssa limb  Upper endoscopy  Liver wedge biopsy  Bilateral Exparel TAP blocks    Postop Complications: none    Hospital Course:  Patient was admitted on 11/30/2022 for scheduled bariatric surgery. Operation was without significant complication. Patient admitted to the floor postoperatively, monitored as per protocol. Diet sequentially advanced beginning POD 1, pain medications transitioned to oral during the hospital course. Currently the patient is afebrile, vital signs stable, tolerating a clear liquid diet with protein supplementation, voiding spontaneously, ambulatory with adequate pain control with oral medications and clear surgical sites without evidence of infection. Discharge Diet:  Clear Liquid Bariatric Diet for 7 days, then soft moist protein diet for 5 weeks    Discharge Medications:  Discharge Medication List as of 12/1/2022 12:09 PM        START taking these medications    Details   oxyCODONE IR (ROXICODONE) 5 mg immediate release tablet Take 1 Tablet by mouth every six (6) hours as needed for Pain for up to 3 days. Max Daily Amount: 20 mg., Normal, Disp-10 Tablet, R-0      ondansetron (ZOFRAN ODT) 4 mg disintegrating tablet Take 1 Tablet by mouth every eight (8) hours as needed for Nausea or Vomiting., Normal, Disp-12 Tablet, R-0           CONTINUE these medications which have NOT CHANGED    Details   ergocalciferol (ERGOCALCIFEROL) 1,250 mcg (50,000 unit) capsule Take 1 Capsule by mouth every seven (7) days.  Indications: vitamin D deficiency (high dose therapy), Normal, Disp-12 Capsule, R-1 atorvastatin (LIPITOR) 20 mg tablet Take 20 mg by mouth daily. , Historical Med      cyanocobalamin (VITAMIN B12) 100 mcg tablet Take 100 mcg by mouth daily. , Historical Med      scopolamine (TRANSDERM-SCOP) 1 mg over 3 days pt3d 1 Patch by TransDERmal route every seventy-two (72) hours.  Indications: prevent nausea and vomiting after surgery, place behind ear evening prior to surgery, Normal, Disp-1 Patch, R-0           STOP taking these medications       ferrous sulfate 325 mg (65 mg iron) tablet Comments:   Reason for Stopping:         hydroCHLOROthiazide (HYDRODIURIL) 25 mg tablet Comments:   Reason for Stopping:         amLODIPine (NORVASC) 5 mg tablet Comments:   Reason for Stopping:         multivitamin (ONE A DAY) tablet Comments:   Reason for Stopping:                 Bariatric Chewable vitamins, 2 orally daily for life  Calcium Citrate 1500 mg orally daily for life  Vitamin B12 1000 micrograms sublingual daily for life  Vitamin D3 5,000 units orally daily for life   Vitamin B1 100 mg orally daily for life    Discharge disposition: home    Discharge condition: stable      Local wound care with daily showers, keep wounds clean and dry     Activity: as desired, no lifting, pushing, or pulling greater than 15lbs or situps for 30 days     Special Instructions:            - No driving until activity is not influenced by incisional pain and off narcotics            - No bath or hot tub until wounds are healed            - Check pulse and temperature twice daily for 10 days            - Notify [employer] for a Temp >100.5 or Pulse>115     Follow-up with your surgeon in 2 weeks, call office for appointment [phone] ([address] [place] [address]) [phone]([address])

## 2022-12-01 NOTE — PROGRESS NOTES
conducted an initial consultation and Spiritual Assessment for Americo Ren, who is a 40 y.o.,female. Patients Primary Language is: [place]. According to the patients EMR Yazdanism Affiliation is: [facility].     The reason the Patient came to the hospital is:   Patient Active Problem List    Diagnosis Date Noted    Morbid obesity ([facility] [address] 11/30/2022        The  provided the following Interventions:  Initiated a relationship of care and support. Patient's mother, Idalia Drake, was present in the room upon visit. Explored issues of cathy, belief, spirituality and Voodoo/ritual needs while hospitalized. Listened empathically. Provided chaplaincy education. Provided information about Spiritual Care Services. Offered prayer and assurance of continued prayers on patient's behalf. Chart reviewed. The following outcomes where achieved:  Patient shared limited information about both her medical narrative and spiritual journey/beliefs.  confirmed Patient's [facility] Yazdanism Affiliation. Patient processed feeling about current hospitalization. ACP Summary:   Completed [facility]  Documented Next of Kin, per patient report  Patient named Cyndee Pinzon (spouse) from [address] [facility], [address] as the primary decision maker and Idalia Drake (mother) from [address], [address] as the secondary decision maker. Patient expressed her wishes to be given life support to prolong her life in the event her death becomes imminent and in the event she loses her awareness of herself, others, and unable to communicate her needs. Patient made it clear that she is not an organ donor. Patient expressed gratitude for 's visit.     Assessment:  Patient's ability to make healthcare decisions is not in question at the time this AMD was completed and done so in the presence of her mother, Idalia Drake. Patient does not have any Gnosticism/cultural needs that will affect patients preferences in health care. There are no spiritual or Gnosticism issues which require intervention at this time. Plan:  Chaplains will continue to follow and will provide pastoral care on an as needed/requested basis.  recommends bedside caregivers page  on duty if patient shows signs of acute spiritual or emotional distress.     Caity Denise5   [phone]

## 2022-12-01 NOTE — PROGRESS NOTES
Surgery Progress Note    12/1/2022    Admit Date: 11/30/2022    Subjective:     Pt reports abd pain but is managed with current plan. Denies n/v and is tolerating PO well. She has been ambulating in the halls. Objective:     Blood pressure (!) 143/80, pulse (!) 101, temperature 97.8 °F (36.6 °C), resp. rate 16, height 5' (1.524 m), weight 113.4 kg (250 lb), SpO2 93 %. 12/01 0701 - 12/01 1900  In: 120 [P.O.:120]  Out: -     11/29 1901 - 12/01 0700  In: 6473 [P.O.:120;  I.V.:1350]  Out: 610 [Urine:600]    EXAM: GENERAL: alert, pleasant, no distress   HEART: regular rate and rhythm   LUNGS: clear to auscultation   ABDOMEN:  Soft, obese, appropriate incisional tenderness, +BS, upper abd slightly distended, surgical incisions clean, dry, no erythema or drainage   EXTREMITIES: warm, well perfused    Data Review    Recent Results (from the past 24 hour(s))   HCG URINE, QL. - POC    Collection Time: 11/30/22 10:32 AM   Result Value Ref Range    Pregnancy test,urine (POC) Negative NEG     CBC W/O DIFF    Collection Time: 12/01/22  5:11 AM   Result Value Ref Range    WBC 19.5 (H) 4.6 - 13.2 K/uL    RBC 4.53 4.20 - 5.30 M/uL    HGB 12.1 12.0 - 16.0 g/dL    HCT 37.5 35.0 - 45.0 %    MCV 82.8 78.0 - 100.0 FL    MCH 26.7 24.0 - 34.0 PG    MCHC 32.3 31.0 - 37.0 g/dL    RDW 14.5 11.6 - 14.5 %    PLATELET 621 (H) 555 - 420 K/uL    MPV 9.1 (L) 9.2 - 11.8 FL    NRBC 0.0 0  WBC    ABSOLUTE NRBC 0.00 0.00 - 6.26 K/uL   METABOLIC PANEL, BASIC    Collection Time: 12/01/22  5:11 AM   Result Value Ref Range    Sodium 134 (L) 136 - 145 mmol/L    Potassium 4.4 3.5 - 5.5 mmol/L    Chloride 99 (L) 100 - 111 mmol/L    CO2 26 21 - 32 mmol/L    Anion gap 9 3.0 - 18 mmol/L    Glucose 138 (H) 74 - 99 mg/dL    BUN 11 7.0 - 18 MG/DL    Creatinine 1.04 0.6 - 1.3 MG/DL    BUN/Creatinine ratio 11 (L) 12 - 20      eGFR >60 >60 ml/min/1.73m2    Calcium 9.2 8.5 - 10.1 MG/DL   MAGNESIUM    Collection Time: 12/01/22  5:11 AM   Result Value Ref Range    Magnesium 1.7 1.6 - 2.6 mg/dL       Assessment:   Pierre Avila is a 40 y.o. female,  day 1 status post   Laparoscopic Melyssa-en-Y gastric bypass, 100 cm Melyssa limb  Upper endoscopy  Liver wedge biopsy  Bilateral Exparel TAP blocks   Condition: good    Plan:   -Ambulate every four hours  -Pain managed prior to d/c   -Nausea managed prior to d/c   -Advance to Clear liquid Gastric Bypass Diet, if able to tolerate clear liquid diet (with pro shake) 4oz per hour for 3 hours prior to d/c    If patient continues to progress d/c home later today     Isabell Rodriguez NP  9:57 AM  12/1/2022

## 2022-12-01 NOTE — PROGRESS NOTES
Bedside and Verbal shift change report given to [facility] SARITA STERN RN (oncoming nurse) by JOY Ge (offgoing nurse). Report included the following information SBAR, Kardex, and Recent Results.

## 2022-12-01 NOTE — PROGRESS NOTES
Pt ambulated to restroom and voided, missing the collection device. Urine output unmeasured. Bladder scan showed 0 PVR.

## 2022-12-01 NOTE — DISCHARGE INSTRUCTIONS
DISCHARGE SUMMARY from Nurse    PATIENT INSTRUCTIONS:    After general anesthesia or intravenous sedation, for 24 hours or while taking prescription Narcotics:  Limit your activities  Do not drive and operate hazardous machinery  Do not make important personal or business decisions  Do  not drink alcoholic beverages  If you have not urinated within 8 hours after discharge, please contact your surgeon on call. Report the following to your surgeon:  Excessive pain, swelling, redness or odor of or around the surgical area  Temperature over 100.5  Nausea and vomiting lasting longer than 4 hours or if unable to take medications  Any signs of decreased circulation or nerve impairment to extremity: change in color, persistent  numbness, tingling, coldness or increase pain  Any questions    What to do at Home:  Recommended activity: Activity as tolerated,    If you experience any of the following symptoms Refer to [facility], [facility], please follow up with Sebas Mueller MD.    *  Please give a list of your current medications to your Primary Care Provider. *  Please update this list whenever your medications are discontinued, doses are      changed, or new medications (including over-the-counter products) are added. *  Please carry medication information at all times in case of emergency situations. These are general instructions for a healthy lifestyle:    No smoking/ No tobacco products/ Avoid exposure to second hand smoke  Surgeon General's Warning:  Quitting smoking now greatly reduces serious risk to your health.     Obesity, smoking, and sedentary lifestyle greatly increases your risk for illness    A healthy diet, regular physical exercise & weight monitoring are important for maintaining a healthy lifestyle    You may be retaining fluid if you have a history of heart failure or if you experience any of the following symptoms:  Weight gain of 3 pounds or more overnight or 5 pounds in a week, increased swelling in our hands or feet or shortness of breath while lying flat in bed. Please call your doctor as soon as you notice any of these symptoms; do not wait until your next office visit. Patient armband removed and shredded   MyChart Activation    Thank you for requesting access to [employer]. Please follow the instructions below to securely access and download your online medical record. [employer] allows you to send messages to your doctor, view your test results, renew your prescriptions, schedule appointments, and more. How Do I Sign Up? In your internet browser, go to [email or web address]  Click on the First Time User? Click Here link in the Sign In box. You will be redirect to the New Member Sign Up page. Enter your [employer] Access Code exactly as it appears below. You will not need to use this code after youve completed the sign-up process. If you do not sign up before the expiration date, you must request a new code. [employer] Access Code: Activation code not generated  Current [employer] Status: Active (This is the date your [employer] access code will )    Enter the last four digits of your Social Security Number (xxxx) and Date of Birth (mm/dd/yyyy) as indicated and click Submit. You will be taken to the next sign-up page. Create a [employer] ID. This will be your [employer] login ID and cannot be changed, so think of one that is secure and easy to remember. Create a [employer] password. You can change your password at any time. Enter your Password Reset Question and Answer. This can be used at a later time if you forget your password. Enter your e-mail address. You will receive e-mail notification when new information is available in [address] Click Sign Up. You can now view and download portions of your medical record. Click the [place] link to download a portable copy of your medical information.     Additional Information    If you have questions, please visit the Frequently Asked Questions section of the [employer] website at [email or web address] Remember, [employer] is NOT to be used for urgent needs. For medical emergencies, dial 911. The discharge information has been reviewed with the {PATIENT PARENT GUARDIAN:49340}. The {PATIENT PARENT GUARDIAN:61673} verbalized understanding. Discharge medications reviewed with the {Dishcarge meds reviewed UEXU:92099} and appropriate educational materials and side effects teaching were provided.   ___________________________________________________________________________________________________________________________________

## 2022-12-04 ENCOUNTER — TELEPHONE (OUTPATIENT)
Dept: SURGERY | Age: 37
End: 2022-12-04

## 2022-12-05 ENCOUNTER — FACE TO FACE ENCOUNTER (OUTPATIENT)
Dept: SURGERY | Age: 37
End: 2022-12-05

## 2022-12-05 VITALS — TEMPERATURE: 98.4 F

## 2022-12-05 DIAGNOSIS — Z98.84 S/P GASTRIC BYPASS: Primary | ICD-10-CM

## 2022-12-05 DIAGNOSIS — G89.18 POST-OP PAIN: ICD-10-CM

## 2022-12-05 RX ORDER — CYCLOBENZAPRINE HCL 5 MG
5 TABLET ORAL
Qty: 15 TABLET | Refills: 0 | Status: SHIPPED | OUTPATIENT
Start: 2022-12-05

## 2022-12-05 RX ORDER — LIDOCAINE 50 MG/G
1 PATCH TOPICAL EVERY 24 HOURS
Qty: 5 EACH | Refills: 0 | Status: SHIPPED | OUTPATIENT
Start: 2022-12-05 | End: 2022-12-08

## 2022-12-05 NOTE — PROGRESS NOTES
S: Pt presented to office today without appointment for further evaluation of abd pain. She is 5 days s/p laparoscopic gastric bypass and liver biopsy with  on 11/30/2022. Reporting LUQ pain and swelling near surgical lap site. Mildly elevated temperature over the weekend with the highest being 100.2 via oral route and 103.0 via temporal route which resolved with Tylenol. Spoke with Dr. Michelle Finney (bariatric surgeon on call) yesterday regarding temperature and that she has not had a BM since surgery. She reports she has had a BM since speaking with Dr. Michelle Finney, her main concern today is the pain around the LUQ surgical lap site. She has been getting in 5 oz fluids per hour and drinking her protein shake. She has been walking and denies elevated HR.     O: Temperature 98.4 oral  Physical Exam  Constitutional:       Appearance: Normal appearance. She is obese. HENT:      Head: Normocephalic and atraumatic. Abdominal:      General: There is no distension. Palpations: Abdomen is soft. Tenderness: There is abdominal tenderness. Musculoskeletal:         General: Normal range of motion. Skin:     General: Skin is warm and dry. Comments: 5 surgical lap sites across upper abd well approximated with steri-strips. No drainage or erythema observed. Small amount of swelling directly above LUQ lap site but not directly at site. Area of swelling tender to touch and slightly warm. No erythema or drainage observed at site. Neurological:      General: No focal deficit present. Mental Status: She is alert and oriented to person, place, and time. Psychiatric:         Mood and Affect: Mood normal.         Behavior: Behavior normal. Behavior is cooperative. A: Post operative pain, Possible post-operative seroma    P:   -Ice packs to area as needed.   -Tylenol q6 hours  -Will send in scripts for lidocaine patch and muscle relaxer.  -Continue monitoring temperature and heart rate  -Continue wound care as directed   -Continue stage one bariatric diet and advanced as previously directed.    -Follow up in 2 days in office (appointment made)

## 2022-12-05 NOTE — TELEPHONE ENCOUNTER
Patient contacted me via answering service. Her chief complaint was that she had not had a bowel movement and had just started her miralax today. While I was asking about that she stated that she also had a fever today. She said that earlier her temp was 99.1 and them later 100.2 via oral thermometer and then 103 via a forehead thermometer. She says that she felt well at the time she took the forehead temp which was just a little bit ago. I had her retake an oral temp while I was on the phone and it was 100.2. She also complains of pain at a left sided incision. The pain improves with pain meds but she has been reluctant to take pain meds. She denies any redness or swelling at the incision    During our conversation she seemed most worried about her bowel movements despite me telling her I was more interested in her temp. We had about a 10 min conversation and agreed that she would take her temp in another couple hours and that if her temp was above 101.5 orally she would either call in to talk to me again or go to the ED. We discussed using incentive spirometry and she said hers was broken so we talked about how to do it by taking a big breath and exhaling through pursed lips. She agreed to do this  We also agreed that she could repeat her miralax now and again in the morning. She said that she was planning on going in to the clinic in the morning due to her concern about her bowel movements. She does not have an appointment but said she would just go in.     I advised her to call me back if she had any other questions

## 2022-12-07 ENCOUNTER — OFFICE VISIT (OUTPATIENT)
Dept: SURGERY | Age: 37
End: 2022-12-07
Payer: COMMERCIAL

## 2022-12-07 VITALS
OXYGEN SATURATION: 99 % | HEIGHT: 60 IN | DIASTOLIC BLOOD PRESSURE: 90 MMHG | RESPIRATION RATE: 18 BRPM | BODY MASS INDEX: 47.71 KG/M2 | SYSTOLIC BLOOD PRESSURE: 162 MMHG | TEMPERATURE: 98.9 F | HEART RATE: 122 BPM | WEIGHT: 243 LBS

## 2022-12-07 DIAGNOSIS — T81.41XA INFECTION OF SUPERFICIAL INCISIONAL SURGICAL SITE AFTER PROCEDURE, INITIAL ENCOUNTER: Primary | ICD-10-CM

## 2022-12-07 DIAGNOSIS — L02.91 ENCOUNTER FOR DRAINAGE OF ABSCESS: ICD-10-CM

## 2022-12-07 DIAGNOSIS — Z98.84 S/P GASTRIC BYPASS: ICD-10-CM

## 2022-12-07 RX ORDER — ASPIRIN 325 MG
50000 TABLET, DELAYED RELEASE (ENTERIC COATED) ORAL
COMMUNITY

## 2022-12-07 RX ORDER — CALCIUM CITRATE/VITAMIN D3 500MG-12.5
500 TABLET,CHEWABLE ORAL 3 TIMES DAILY
COMMUNITY

## 2022-12-07 RX ORDER — AMOXICILLIN AND CLAVULANATE POTASSIUM 875; 125 MG/1; MG/1
1 TABLET, FILM COATED ORAL EVERY 12 HOURS
Qty: 10 TABLET | Refills: 0 | Status: SHIPPED | OUTPATIENT
Start: 2022-12-07

## 2022-12-07 RX ORDER — LANOLIN ALCOHOL/MO/W.PET/CERES
CREAM (GRAM) TOPICAL DAILY
COMMUNITY

## 2022-12-07 NOTE — PROGRESS NOTES
Maryjo Pulido is a 40 y.o. female  Ms. Stacie Agrawal has been given the following recommendations today due to her elevated BP reading: referred to Alternative/PCP. Chief Complaint   Patient presents with    Post OP Follow Up     [facility] 11/30/2022. C/o ULQ pain at incision site.

## 2022-12-08 NOTE — PROGRESS NOTES
Bariatric Postoperative Progress Note    CC: LUQ Pain    Geovanna Waldron is a 40 y.o. female now 1 week status post laparoscopic gastric bypass surgery performed on 2022. Currently on stage one diet. Walking 15 min twice daily. Reports she is complaint with her bariatric vitamin supplementation and getting in at least 30 oz fluids daily. Pain has worsened at LUQ lap site and swelling has increased since since she came into office on  and she does not want to walk or do anything. She developed a rash where the lidocaine patch was placed and did not want to try it a second time. She has been taking Tylenol every 6 hours without improvement. She does not want to take the oxycodone as it makes her sleepy. Denies n/v, dysphagia, fevers, chills, diarrhea, constipation, or pain in extremities. Weight Loss Metrics 2022   Pre op / Initial Wt 259 - - - 259 - 266   Today's Wt - 243 lb - 250 lb - 259 lb -   BMI - 47.46 kg/m2 48.82 kg/m2 - - 50.58 kg/m2 -   Ideal Body Wt 119 - - - 120 - 120   Excess Body Wt 140 - - - 139 - 146   Goal Wt 147 - - - 148 - 149   Wt loss to date 16 - - - 0 - 6   % Wt Loss 0.14 - - - 0 - 0.05   80%  - - - 111.2 - 116.8         Past Medical History:   Diagnosis Date    Adverse effect of anesthesia     after c sections, pt get very cold and has trembles/shakes     Anemia     HTN (hypertension)        Past Surgical History:   Procedure Laterality Date    HX  SECTION      x4    HX GASTRIC BYPASS  2022    HX GYN      tubal ligation,ablation       Current Outpatient Medications   Medication Sig Dispense Refill    multivitamin with iron (FLINTSTONES) chewable tablet Take 1 Tablet by mouth two (2) times a day. calcium citrate-vitamin D3 500 mg-12.5 mcg (500 unit) chew Take 500 mg by mouth three (3) times daily.       cholecalciferol (VITAMIN D3) (50,000 UNITS /1250 MCG) capsule Take 50,000 Units by mouth every seven (7) days. thiamine HCL (Vitamin B-1) 100 mg tablet Take  by mouth daily. amoxicillin-clavulanate (AUGMENTIN) 875-125 mg per tablet Take 1 Tablet by mouth every twelve (12) hours. 10 Tablet 0    cyclobenzaprine (FLEXERIL) 5 mg tablet Take 1 Tablet by mouth three (3) times daily as needed for Muscle Spasm(s). 15 Tablet 0    ondansetron (ZOFRAN ODT) 4 mg disintegrating tablet Take 1 Tablet by mouth every eight (8) hours as needed for Nausea or Vomiting. 12 Tablet 0    enoxaparin (LOVENOX) 40 mg/0.4 mL 0.4 mL by SubCUTAneous route daily. 7 Each 0    ergocalciferol (ERGOCALCIFEROL) 1,250 mcg (50,000 unit) capsule Take 1 Capsule by mouth every seven (7) days. Indications: vitamin D deficiency (high dose therapy) 12 Capsule 1    atorvastatin (LIPITOR) 20 mg tablet Take 20 mg by mouth daily. cyanocobalamin (VITAMIN B12) 100 mcg tablet Take 100 mcg by mouth daily. No Known Allergies    ROS:  Review of Systems   Constitutional:  Positive for weight loss. Negative for malaise/fatigue. Respiratory: Negative. Cardiovascular:  Negative for chest pain and palpitations. Gastrointestinal:  Positive for abdominal pain and melena. Negative for blood in stool, constipation, diarrhea, heartburn, nausea and vomiting. Skin:  Positive for itching and rash. Neurological:  Positive for weakness. Negative for dizziness and headaches. Physicial Exam:  Visit Vitals  BP (!) 162/90 (BP 1 Location: Left arm, BP Patient Position: Sitting, BP Cuff Size: Other (Comment))   Pulse (!) 122   Temp 98.9 °F (37.2 °C) (Temporal)   Resp 18   Ht 5' (1.524 m)   Wt 110.2 kg (243 lb)   SpO2 99%   BMI 47.46 kg/m²     Physical Exam  Vitals and nursing note reviewed. Constitutional:       Appearance: She is obese. HENT:      Head: Normocephalic and atraumatic. Pulmonary:      Effort: Pulmonary effort is normal.   Abdominal:      General: Bowel sounds are normal. There is no distension. Palpations: Abdomen is soft. Tenderness: There is abdominal tenderness. Musculoskeletal:         General: Normal range of motion. Skin:     General: Skin is warm and dry. Neurological:      General: No focal deficit present. Mental Status: She is alert and oriented to person, place, and time. Psychiatric:         Mood and Affect: Mood normal.         Behavior: Behavior normal.       Labs:   Recent Results (from the past 672 hour(s))   HCG URINE, QL. - POC    Collection Time: 11/30/22 10:32 AM   Result Value Ref Range    Pregnancy test,urine (POC) Negative NEG     CBC W/O DIFF    Collection Time: 12/01/22  5:11 AM   Result Value Ref Range    WBC 19.5 (H) 4.6 - 13.2 K/uL    RBC 4.53 4.20 - 5.30 M/uL    HGB 12.1 12.0 - 16.0 g/dL    HCT 37.5 35.0 - 45.0 %    MCV 82.8 78.0 - 100.0 FL    MCH 26.7 24.0 - 34.0 PG    MCHC 32.3 31.0 - 37.0 g/dL    RDW 14.5 11.6 - 14.5 %    PLATELET 668 (H) 031 - 420 K/uL    MPV 9.1 (L) 9.2 - 11.8 FL    NRBC 0.0 0  WBC    ABSOLUTE NRBC 0.00 0.00 - 8.58 K/uL   METABOLIC PANEL, BASIC    Collection Time: 12/01/22  5:11 AM   Result Value Ref Range    Sodium 134 (L) 136 - 145 mmol/L    Potassium 4.4 3.5 - 5.5 mmol/L    Chloride 99 (L) 100 - 111 mmol/L    CO2 26 21 - 32 mmol/L    Anion gap 9 3.0 - 18 mmol/L    Glucose 138 (H) 74 - 99 mg/dL    BUN 11 7.0 - 18 MG/DL    Creatinine 1.04 0.6 - 1.3 MG/DL    BUN/Creatinine ratio 11 (L) 12 - 20      eGFR >60 >60 ml/min/1.73m2    Calcium 9.2 8.5 - 10.1 MG/DL   MAGNESIUM    Collection Time: 12/01/22  5:11 AM   Result Value Ref Range    Magnesium 1.7 1.6 - 2.6 mg/dL       Assessment:  Surgical Site Abscess    Plan:   After consent was obtained, area around LUQ lap site numbed with 6 mL of 1% lidocaine. Reopened surgical lap site partially with hemostat. Drained purulent foul smelling exudate from wound and irrigated with sterile normal saline.  Unable to pack wound as abscess appeared very superficial and pt was only able to tolerate partial opening. Covered and taped 4x4 piece of gauze over wound. Pt tolerated procedure well with multiple breaks but was tearful throughout process. Discussed wound care with pt, keep area clean and dry. Change gauze covering every 24 hours or if saturated. Follow up in office in two days for wound check. Will prescribe 5 day course of Augmentin. Advance to stage two diet tomorrow. Tylenol q6 hours with previously prescribed oxycodone prn. Continue with bariatric vitamin supplementation. Increase fluids to 64 oz. Pt reports some dark stool since surgery. Denies dizziness, SOB, or bright red blood in stool. The primary encounter diagnosis was Infection of superficial incisional surgical site after procedure, initial encounter. Diagnoses of S/P gastric bypass and Encounter for drainage of abscess were also pertinent to this visit. Follow-up and Dispositions    Return in about 2 days (around 12/9/2022).        Mark Valdovinos NP

## 2022-12-09 ENCOUNTER — OFFICE VISIT (OUTPATIENT)
Dept: SURGERY | Age: 37
End: 2022-12-09
Payer: COMMERCIAL

## 2022-12-09 VITALS
RESPIRATION RATE: 18 BRPM | SYSTOLIC BLOOD PRESSURE: 132 MMHG | BODY MASS INDEX: 46.72 KG/M2 | HEIGHT: 60 IN | TEMPERATURE: 97.8 F | HEART RATE: 112 BPM | OXYGEN SATURATION: 99 % | WEIGHT: 238 LBS | DIASTOLIC BLOOD PRESSURE: 86 MMHG

## 2022-12-09 DIAGNOSIS — Z98.84 S/P GASTRIC BYPASS: ICD-10-CM

## 2022-12-09 DIAGNOSIS — Z48.00 ENCOUNTER FOR CHANGE OR REMOVAL OF WOUND DRESSING: ICD-10-CM

## 2022-12-09 DIAGNOSIS — T81.41XD INFECTION OF SUPERFICIAL INCISIONAL SURGICAL SITE AFTER PROCEDURE, SUBSEQUENT ENCOUNTER: Primary | ICD-10-CM

## 2022-12-09 PROCEDURE — 99024 POSTOP FOLLOW-UP VISIT: CPT | Performed by: NURSE PRACTITIONER

## 2022-12-09 RX ORDER — OXYCODONE HYDROCHLORIDE 5 MG/1
5 TABLET ORAL
Qty: 5 TABLET | Refills: 0 | Status: SHIPPED | OUTPATIENT
Start: 2022-12-09 | End: 2022-12-12

## 2022-12-09 NOTE — PROGRESS NOTES
Pierre Avila is a 40 y.o. female  Chief Complaint   Patient presents with    Post OP Follow Up     [facility] 11/30/2022. Patient presents for wound check and repacking of wound LUQ at incision site.

## 2022-12-12 ENCOUNTER — OFFICE VISIT (OUTPATIENT)
Dept: SURGERY | Age: 37
End: 2022-12-12
Payer: COMMERCIAL

## 2022-12-12 VITALS
BODY MASS INDEX: 46.37 KG/M2 | HEIGHT: 60 IN | SYSTOLIC BLOOD PRESSURE: 129 MMHG | DIASTOLIC BLOOD PRESSURE: 82 MMHG | RESPIRATION RATE: 18 BRPM | TEMPERATURE: 98.1 F | OXYGEN SATURATION: 98 % | WEIGHT: 236.2 LBS | HEART RATE: 101 BPM

## 2022-12-12 DIAGNOSIS — Z48.00 ENCOUNTER FOR CHANGE OR REMOVAL OF WOUND DRESSING: Primary | ICD-10-CM

## 2022-12-12 DIAGNOSIS — T81.41XD INFECTION OF SUPERFICIAL INCISIONAL SURGICAL SITE AFTER PROCEDURE, SUBSEQUENT ENCOUNTER: ICD-10-CM

## 2022-12-12 DIAGNOSIS — Z98.84 S/P GASTRIC BYPASS: ICD-10-CM

## 2022-12-12 PROCEDURE — 99024 POSTOP FOLLOW-UP VISIT: CPT | Performed by: NURSE PRACTITIONER

## 2022-12-13 NOTE — PROGRESS NOTES
Bariatric Postoperative Progress Note    CC: Wound Follow Up    Haim Fernandez is a 40 y.o. female now 12 days status post laparoscopic gastric bypass surgery performed on 2022. Tolerating stage two bariatric diet. Walking daily, reports she is complaint with her bariatric vitamin supplementation, and getting in at least 50 oz fluids daily. Accompanied by her . Completed antibiotics and reports pain continues to improve. Went to and walked around [employer] yesterday and did well but got tired. Denies n/v, dysphagia, fevers, chills, diarrhea, constipation, or pain in extremities. Weight Loss Metrics 2022   Pre op / Initial Wt - 259 - 259 - - -   Today's Wt 236 lb 3.2 oz - 238 lb - 243 lb - 250 lb   BMI 46.13 kg/m2 - 46.48 kg/m2 - 47.46 kg/m2 48.82 kg/m2 -   Ideal Body Wt - 120 - 119 - - -   Excess Body Wt - 139 - 140 - - -   Goal Wt - 148 - 147 - - -   Wt loss to date - 21 - 16 - - -   % Wt Loss - 0.19 - 0.14 - - -   80% EBW - 111.2 - 112 - - -         Past Medical History:   Diagnosis Date    Adverse effect of anesthesia     after c sections, pt get very cold and has trembles/shakes     Anemia     HTN (hypertension)        Past Surgical History:   Procedure Laterality Date    HX  SECTION      x4    HX GASTRIC BYPASS  2022    HX GYN      tubal ligation,ablation       Current Outpatient Medications   Medication Sig Dispense Refill    multivitamin with iron (FLINTSTONES) chewable tablet Take 1 Tablet by mouth two (2) times a day. calcium citrate-vitamin D3 500 mg-12.5 mcg (500 unit) chew Take 500 mg by mouth three (3) times daily. cholecalciferol (VITAMIN D3) (50,000 UNITS /1250 MCG) capsule Take 50,000 Units by mouth every seven (7) days. thiamine HCL (B-1) 100 mg tablet Take  by mouth daily.       amoxicillin-clavulanate (AUGMENTIN) 875-125 mg per tablet Take 1 Tablet by mouth every twelve (12) hours. 10 Tablet 0    cyclobenzaprine (FLEXERIL) 5 mg tablet Take 1 Tablet by mouth three (3) times daily as needed for Muscle Spasm(s). 15 Tablet 0    ondansetron (ZOFRAN ODT) 4 mg disintegrating tablet Take 1 Tablet by mouth every eight (8) hours as needed for Nausea or Vomiting. 12 Tablet 0    enoxaparin (LOVENOX) 40 mg/0.4 mL 0.4 mL by SubCUTAneous route daily. (Patient not taking: Reported on 12/9/2022) 7 Each 0    ergocalciferol (ERGOCALCIFEROL) 1,250 mcg (50,000 unit) capsule Take 1 Capsule by mouth every seven (7) days. Indications: vitamin D deficiency (high dose therapy) (Patient not taking: Reported on 12/9/2022) 12 Capsule 1    atorvastatin (LIPITOR) 20 mg tablet Take 20 mg by mouth daily. cyanocobalamin (VITAMIN B12) 100 mcg tablet Take 100 mcg by mouth daily. No Known Allergies    ROS:  Review of Systems   Constitutional:  Positive for weight loss. Negative for malaise/fatigue. Respiratory: Negative. Cardiovascular:  Negative for chest pain and palpitations. Gastrointestinal:  Positive for abdominal pain. Negative for blood in stool, constipation, diarrhea, heartburn, melena, nausea and vomiting. Skin:  Negative for itching and rash. Neurological:  Negative for dizziness, weakness and headaches. Physicial Exam:  Visit Vitals  /82 (BP 1 Location: Left upper arm, BP Patient Position: Sitting, BP Cuff Size: Large adult)   Pulse (!) 101   Temp 98.1 °F (36.7 °C)   Resp 18   Ht 5' (1.524 m)   Wt 107.1 kg (236 lb 3.2 oz)   SpO2 98%   BMI 46.13 kg/m²     Physical Exam  Vitals and nursing note reviewed. Constitutional:       Appearance: She is obese. HENT:      Head: Normocephalic and atraumatic. Pulmonary:      Effort: Pulmonary effort is normal.   Abdominal:      General: Bowel sounds are normal. There is no distension. Palpations: Abdomen is soft. Tenderness: There is abdominal tenderness.    Musculoskeletal:         General: Normal range of motion. Skin:     General: Skin is warm and dry. Neurological:      General: No focal deficit present. Mental Status: She is alert and oriented to person, place, and time. Psychiatric:         Mood and Affect: Mood normal.         Behavior: Behavior normal.       Labs:   Recent Results (from the past 672 hour(s))   HCG URINE, QL. - POC    Collection Time: 11/30/22 10:32 AM   Result Value Ref Range    Pregnancy test,urine (POC) Negative NEG     CBC W/O DIFF    Collection Time: 12/01/22  5:11 AM   Result Value Ref Range    WBC 19.5 (H) 4.6 - 13.2 K/uL    RBC 4.53 4.20 - 5.30 M/uL    HGB 12.1 12.0 - 16.0 g/dL    HCT 37.5 35.0 - 45.0 %    MCV 82.8 78.0 - 100.0 FL    MCH 26.7 24.0 - 34.0 PG    MCHC 32.3 31.0 - 37.0 g/dL    RDW 14.5 11.6 - 14.5 %    PLATELET 500 (H) 127 - 420 K/uL    MPV 9.1 (L) 9.2 - 11.8 FL    NRBC 0.0 0  WBC    ABSOLUTE NRBC 0.00 0.00 - 3.93 K/uL   METABOLIC PANEL, BASIC    Collection Time: 12/01/22  5:11 AM   Result Value Ref Range    Sodium 134 (L) 136 - 145 mmol/L    Potassium 4.4 3.5 - 5.5 mmol/L    Chloride 99 (L) 100 - 111 mmol/L    CO2 26 21 - 32 mmol/L    Anion gap 9 3.0 - 18 mmol/L    Glucose 138 (H) 74 - 99 mg/dL    BUN 11 7.0 - 18 MG/DL    Creatinine 1.04 0.6 - 1.3 MG/DL    BUN/Creatinine ratio 11 (L) 12 - 20      eGFR >60 >60 ml/min/1.73m2    Calcium 9.2 8.5 - 10.1 MG/DL   MAGNESIUM    Collection Time: 12/01/22  5:11 AM   Result Value Ref Range    Magnesium 1.7 1.6 - 2.6 mg/dL       Assessment:  Appropriately healing superficial incisional surgical site infection    Plan:   Removed gauze covering over wound with minimal clear drainage. Removed aquacel packing and irrigated with normal saline. Packed 1 ribbon of aquacel ag into wound and covered with 4x4 gauze and taped into place. Wound care discussed, change gauze covering every 24  hours or if becomes saturated. Return to office on Wednesday 12/14 to remove and replace packing.  Pt and  do not want to pack wound, discussed possibility of home healthcare if additional packing is needed. Tylenol q6 hours prn. Continue stage 2 bariatric diet. Continue with bariatric vitamin supplementation. Continue to increase fluids to 64 oz. The primary encounter diagnosis was Encounter for change or removal of wound dressing. Diagnoses of Infection of superficial incisional surgical site after procedure, subsequent encounter and S/P gastric bypass were also pertinent to this visit. Follow-up and Dispositions    Return in about 2 days (around 12/14/2022).          Grisel Vera NP

## 2022-12-13 NOTE — PROGRESS NOTES
Bariatric Postoperative Progress Note    CC: Wound Follow Up    Kuldeep Roach is a 40 y.o. female now 9 days status post laparoscopic gastric bypass surgery performed on 2022. Currently on stage one diet. Walking daily, reports she is complaint with her bariatric vitamin supplementation, and getting in at least 40-50 oz fluids daily. Accompanied by her . Pain has improved, started antibiotics, and overall feels better-able to walk and do her daily activities. Denies n/v, dysphagia, fevers, chills, diarrhea, constipation, or pain in extremities. Weight Loss Metrics 2022   Pre op / Initial Wt - 259 - 259 - - -   Today's Wt 236 lb 3.2 oz - 238 lb - 243 lb - 250 lb   BMI 46.13 kg/m2 - 46.48 kg/m2 - 47.46 kg/m2 48.82 kg/m2 -   Ideal Body Wt - 120 - 119 - - -   Excess Body Wt - 139 - 140 - - -   Goal Wt - 148 - 147 - - -   Wt loss to date - 21 - 16 - - -   % Wt Loss - 0.19 - 0.14 - - -   80% EBW - 111.2 - 112 - - -         Past Medical History:   Diagnosis Date    Adverse effect of anesthesia     after c sections, pt get very cold and has trembles/shakes     Anemia     HTN (hypertension)        Past Surgical History:   Procedure Laterality Date    HX  SECTION      x4    HX GASTRIC BYPASS  2022    HX GYN      tubal ligation,ablation       Current Outpatient Medications   Medication Sig Dispense Refill    multivitamin with iron (FLINTSTONES) chewable tablet Take 1 Tablet by mouth two (2) times a day. calcium citrate-vitamin D3 500 mg-12.5 mcg (500 unit) chew Take 500 mg by mouth three (3) times daily. cholecalciferol (VITAMIN D3) (50,000 UNITS /1250 MCG) capsule Take 50,000 Units by mouth every seven (7) days. thiamine HCL (B-1) 100 mg tablet Take  by mouth daily. amoxicillin-clavulanate (AUGMENTIN) 875-125 mg per tablet Take 1 Tablet by mouth every twelve (12) hours.  10 Tablet 0 cyclobenzaprine (FLEXERIL) 5 mg tablet Take 1 Tablet by mouth three (3) times daily as needed for Muscle Spasm(s). 15 Tablet 0    ondansetron (ZOFRAN ODT) 4 mg disintegrating tablet Take 1 Tablet by mouth every eight (8) hours as needed for Nausea or Vomiting. 12 Tablet 0    atorvastatin (LIPITOR) 20 mg tablet Take 20 mg by mouth daily. cyanocobalamin (VITAMIN B12) 100 mcg tablet Take 100 mcg by mouth daily. enoxaparin (LOVENOX) 40 mg/0.4 mL 0.4 mL by SubCUTAneous route daily. (Patient not taking: Reported on 12/9/2022) 7 Each 0    ergocalciferol (ERGOCALCIFEROL) 1,250 mcg (50,000 unit) capsule Take 1 Capsule by mouth every seven (7) days. Indications: vitamin D deficiency (high dose therapy) (Patient not taking: Reported on 12/9/2022) 12 Capsule 1       No Known Allergies    ROS:  Review of Systems   Constitutional:  Positive for weight loss. Negative for malaise/fatigue. Respiratory: Negative. Cardiovascular:  Negative for chest pain and palpitations. Gastrointestinal:  Positive for abdominal pain. Negative for blood in stool, constipation, diarrhea, heartburn, melena, nausea and vomiting. Skin:  Negative for itching and rash. Neurological:  Negative for dizziness, weakness and headaches. Physicial Exam:  Visit Vitals  /86   Pulse (!) 112   Temp 97.8 °F (36.6 °C) (Temporal)   Resp 18   Ht 5' (1.524 m)   Wt 108 kg (238 lb)   SpO2 99%   BMI 46.48 kg/m²     Physical Exam  Vitals and nursing note reviewed. Constitutional:       Appearance: She is obese. HENT:      Head: Normocephalic and atraumatic. Pulmonary:      Effort: Pulmonary effort is normal.   Abdominal:      General: Bowel sounds are normal. There is no distension. Palpations: Abdomen is soft. Tenderness: There is abdominal tenderness. Musculoskeletal:         General: Normal range of motion. Skin:     General: Skin is warm and dry. Neurological:      General: No focal deficit present. Mental Status: She is alert and oriented to person, place, and time. Psychiatric:         Mood and Affect: Mood normal.         Behavior: Behavior normal.       Labs:   Recent Results (from the past 672 hour(s))   HCG URINE, QL. - POC    Collection Time: 11/30/22 10:32 AM   Result Value Ref Range    Pregnancy test,urine (POC) Negative NEG     CBC W/O DIFF    Collection Time: 12/01/22  5:11 AM   Result Value Ref Range    WBC 19.5 (H) 4.6 - 13.2 K/uL    RBC 4.53 4.20 - 5.30 M/uL    HGB 12.1 12.0 - 16.0 g/dL    HCT 37.5 35.0 - 45.0 %    MCV 82.8 78.0 - 100.0 FL    MCH 26.7 24.0 - 34.0 PG    MCHC 32.3 31.0 - 37.0 g/dL    RDW 14.5 11.6 - 14.5 %    PLATELET 226 (H) 049 - 420 K/uL    MPV 9.1 (L) 9.2 - 11.8 FL    NRBC 0.0 0  WBC    ABSOLUTE NRBC 0.00 0.00 - 7.94 K/uL   METABOLIC PANEL, BASIC    Collection Time: 12/01/22  5:11 AM   Result Value Ref Range    Sodium 134 (L) 136 - 145 mmol/L    Potassium 4.4 3.5 - 5.5 mmol/L    Chloride 99 (L) 100 - 111 mmol/L    CO2 26 21 - 32 mmol/L    Anion gap 9 3.0 - 18 mmol/L    Glucose 138 (H) 74 - 99 mg/dL    BUN 11 7.0 - 18 MG/DL    Creatinine 1.04 0.6 - 1.3 MG/DL    BUN/Creatinine ratio 11 (L) 12 - 20      eGFR >60 >60 ml/min/1.73m2    Calcium 9.2 8.5 - 10.1 MG/DL   MAGNESIUM    Collection Time: 12/01/22  5:11 AM   Result Value Ref Range    Magnesium 1.7 1.6 - 2.6 mg/dL       Assessment:  Appropriately healing superficial incisional surgical site infection    Plan:   Removed gauze covering over wound saturated with clear drainage. Removed additional exudate from wound and irrigated with normal saline. With assistance from Dr. Katarina Ocampo packed 1 ribbon of aquacel ag into wound and covered with 4x4 gauze and taped into place. Wound care discussed, change gauze covering every 24  hours or if becomes saturated. Return to office on Monday 12/12 to remove and replace packing.    Will send in refill of oxycodone 5 mg if needing pain control over weekend, continue with Tylenol q6 hours prn. Advance to stage 2 bariatric diet. Continue with bariatric vitamin supplementation. Continue to increase fluids to 64 oz. The primary encounter diagnosis was Infection of superficial incisional surgical site after procedure, subsequent encounter. Diagnoses of Encounter for change or removal of wound dressing and S/P gastric bypass were also pertinent to this visit. Follow-up and Dispositions    Return in about 3 days (around 12/12/2022).        Chavo Schuler NP

## 2022-12-14 ENCOUNTER — OFFICE VISIT (OUTPATIENT)
Dept: SURGERY | Age: 37
End: 2022-12-14
Payer: COMMERCIAL

## 2022-12-14 VITALS
OXYGEN SATURATION: 100 % | BODY MASS INDEX: 45.94 KG/M2 | SYSTOLIC BLOOD PRESSURE: 120 MMHG | HEART RATE: 100 BPM | RESPIRATION RATE: 18 BRPM | TEMPERATURE: 98.3 F | WEIGHT: 234 LBS | DIASTOLIC BLOOD PRESSURE: 84 MMHG | HEIGHT: 60 IN

## 2022-12-14 DIAGNOSIS — T81.41XD INFECTION OF SUPERFICIAL INCISIONAL SURGICAL SITE AFTER PROCEDURE, SUBSEQUENT ENCOUNTER: ICD-10-CM

## 2022-12-14 DIAGNOSIS — Z48.00 ENCOUNTER FOR CHANGE OR REMOVAL OF WOUND DRESSING: Primary | ICD-10-CM

## 2022-12-14 DIAGNOSIS — Z98.84 S/P GASTRIC BYPASS: ICD-10-CM

## 2022-12-14 DIAGNOSIS — E55.9 VITAMIN D DEFICIENCY: ICD-10-CM

## 2022-12-14 DIAGNOSIS — K91.2 POST-RESECTION MALABSORPTION: ICD-10-CM

## 2022-12-14 PROCEDURE — 99024 POSTOP FOLLOW-UP VISIT: CPT | Performed by: NURSE PRACTITIONER

## 2022-12-14 RX ORDER — ASPIRIN 325 MG
50000 TABLET, DELAYED RELEASE (ENTERIC COATED) ORAL
Qty: 12 CAPSULE | Refills: 1 | Status: SHIPPED | OUTPATIENT
Start: 2022-12-14

## 2022-12-14 NOTE — PROGRESS NOTES
Najma Hill is a 40 y.o. female  Chief Complaint   Patient presents with    Follow-up     GBP 11/30/22 wound check near incision area.

## 2022-12-15 ENCOUNTER — HOME HEALTH ADMISSION (OUTPATIENT)
Dept: HOME HEALTH SERVICES | Facility: HOME HEALTH | Age: 37
End: 2022-12-15
Payer: COMMERCIAL

## 2022-12-15 NOTE — PROGRESS NOTES
Bariatric Postoperative Progress Note    CC: Wound Follow Up    Raymundo Nevarez is a 40 y.o. female now 14 days status post laparoscopic gastric bypass surgery performed on 2022. Tolerating stage two bariatric diet. Walking daily, reports she is complaint with her bariatric vitamin supplementation, and getting in at least 50 oz fluids daily. Accompanied by her mother, reports pain continues to improve. Requesting refill of high dose weekly vitamin D. Denies n/v, dysphagia, fevers, chills, diarrhea, constipation, or pain in extremities. Weight Loss Metrics 2022   Pre op / Initial Wt 259 - - 259 - 259 -   Today's Wt - 234 lb 236 lb 3.2 oz - 238 lb - 243 lb   BMI - 45.7 kg/m2 46.13 kg/m2 - 46.48 kg/m2 - 47.46 kg/m2   Ideal Body Wt 119 - - 120 - 119 -   Excess Body Wt 140 - - 139 - 140 -   Goal Wt 148 - - 148 - 147 -   Wt loss to date 25 - - 21 - 16 -   % Wt Loss 0.22 - - 0.19 - 0.14 -   80%  - - 111.2 - 112 -         Past Medical History:   Diagnosis Date    Adverse effect of anesthesia     after c sections, pt get very cold and has trembles/shakes     Anemia     HTN (hypertension)        Past Surgical History:   Procedure Laterality Date    HX  SECTION      x4    HX GASTRIC BYPASS  2022    HX GYN      tubal ligation,ablation       Current Outpatient Medications   Medication Sig Dispense Refill    cholecalciferol (VITAMIN D3) (50,000 UNITS /1250 MCG) capsule Take 1 Capsule by mouth every seven (7) days. 12 Capsule 1    multivitamin with iron (FLINTSTONES) chewable tablet Take 1 Tablet by mouth two (2) times a day. calcium citrate-vitamin D3 500 mg-12.5 mcg (500 unit) chew Take 500 mg by mouth three (3) times daily. thiamine HCL (B-1) 100 mg tablet Take  by mouth daily. cyclobenzaprine (FLEXERIL) 5 mg tablet Take 1 Tablet by mouth three (3) times daily as needed for Muscle Spasm(s).  15 Tablet 0 ondansetron (ZOFRAN ODT) 4 mg disintegrating tablet Take 1 Tablet by mouth every eight (8) hours as needed for Nausea or Vomiting. 12 Tablet 0    atorvastatin (LIPITOR) 20 mg tablet Take 20 mg by mouth daily. cyanocobalamin (VITAMIN B12) 100 mcg tablet Take 100 mcg by mouth daily. No Known Allergies    ROS:  Review of Systems   Constitutional:  Positive for weight loss. Negative for malaise/fatigue. Respiratory: Negative. Cardiovascular:  Negative for chest pain and palpitations. Gastrointestinal:  Positive for abdominal pain. Negative for blood in stool, constipation, diarrhea, heartburn, melena, nausea and vomiting. Skin:  Negative for itching and rash. Neurological:  Negative for dizziness, weakness and headaches. Physicial Exam:  Visit Vitals  /84   Pulse 100   Temp 98.3 °F (36.8 °C) (Temporal)   Resp 18   Ht 5' (1.524 m)   Wt 106.1 kg (234 lb)   SpO2 100%   BMI 45.70 kg/m²     Physical Exam  Vitals and nursing note reviewed. Constitutional:       Appearance: She is obese. HENT:      Head: Normocephalic and atraumatic. Pulmonary:      Effort: Pulmonary effort is normal.   Abdominal:      General: Bowel sounds are normal. There is no distension. Palpations: Abdomen is soft. Tenderness: There is abdominal tenderness. Musculoskeletal:         General: Normal range of motion. Skin:     General: Skin is warm and dry. Neurological:      General: No focal deficit present. Mental Status: She is alert and oriented to person, place, and time.    Psychiatric:         Mood and Affect: Mood normal.         Behavior: Behavior normal.       Labs:   Recent Results (from the past 672 hour(s))   HCG URINE, QL. - POC    Collection Time: 11/30/22 10:32 AM   Result Value Ref Range    Pregnancy test,urine (POC) Negative NEG     CBC W/O DIFF    Collection Time: 12/01/22  5:11 AM   Result Value Ref Range    WBC 19.5 (H) 4.6 - 13.2 K/uL    RBC 4.53 4.20 - 5.30 M/uL HGB 12.1 12.0 - 16.0 g/dL    HCT 37.5 35.0 - 45.0 %    MCV 82.8 78.0 - 100.0 FL    MCH 26.7 24.0 - 34.0 PG    MCHC 32.3 31.0 - 37.0 g/dL    RDW 14.5 11.6 - 14.5 %    PLATELET 298 (H) 679 - 420 K/uL    MPV 9.1 (L) 9.2 - 11.8 FL    NRBC 0.0 0  WBC    ABSOLUTE NRBC 0.00 0.00 - 1.79 K/uL   METABOLIC PANEL, BASIC    Collection Time: 12/01/22  5:11 AM   Result Value Ref Range    Sodium 134 (L) 136 - 145 mmol/L    Potassium 4.4 3.5 - 5.5 mmol/L    Chloride 99 (L) 100 - 111 mmol/L    CO2 26 21 - 32 mmol/L    Anion gap 9 3.0 - 18 mmol/L    Glucose 138 (H) 74 - 99 mg/dL    BUN 11 7.0 - 18 MG/DL    Creatinine 1.04 0.6 - 1.3 MG/DL    BUN/Creatinine ratio 11 (L) 12 - 20      eGFR >60 >60 ml/min/1.73m2    Calcium 9.2 8.5 - 10.1 MG/DL   MAGNESIUM    Collection Time: 12/01/22  5:11 AM   Result Value Ref Range    Magnesium 1.7 1.6 - 2.6 mg/dL       Assessment:  Appropriately healing superficial incisional surgical site infection    Plan:   Removed gauze covering over wound with minimal clear drainage. Removed aquacel packing and irrigated with normal saline. Packed approximately 1.5 cm of calcium alginate rope into wound and covered with 4x4 gauze and taped into place. Wound care discussed, change gauze covering every 24  hours or if becomes saturated. Pt and  do not want to pack wound themselves, will order home health for skilled nursing wound care. Tylenol q6 hours prn. Continue stage 2 bariatric diet. Continue with bariatric vitamin supplementation. High dose weekly D3 ordered. Continue to increase fluids to 64 oz. Increase activity as tolerated. The primary encounter diagnosis was Infection of superficial incisional surgical site after procedure, subsequent encounter. Diagnoses of Post-resection malabsorption, S/P gastric bypass, Vitamin D deficiency, and Encounter for change or removal of wound dressing were also pertinent to this visit.      Return in one month for wound check, return in 3 months for 3 month bariatric follow up with labs.      Chanel Peralta NP

## 2022-12-16 ENCOUNTER — TELEPHONE (OUTPATIENT)
Dept: SURGERY | Age: 37
End: 2022-12-16

## 2022-12-16 ENCOUNTER — OFFICE VISIT (OUTPATIENT)
Dept: SURGERY | Age: 37
End: 2022-12-16
Payer: COMMERCIAL

## 2022-12-16 VITALS
DIASTOLIC BLOOD PRESSURE: 86 MMHG | HEART RATE: 102 BPM | HEIGHT: 60 IN | RESPIRATION RATE: 18 BRPM | WEIGHT: 232 LBS | BODY MASS INDEX: 45.55 KG/M2 | SYSTOLIC BLOOD PRESSURE: 122 MMHG | OXYGEN SATURATION: 99 % | TEMPERATURE: 98.2 F

## 2022-12-16 DIAGNOSIS — Z98.84 S/P GASTRIC BYPASS: ICD-10-CM

## 2022-12-16 DIAGNOSIS — Z48.00 ENCOUNTER FOR CHANGE OR REMOVAL OF WOUND DRESSING: Primary | ICD-10-CM

## 2022-12-16 DIAGNOSIS — E66.01 MORBID OBESITY (HCC): ICD-10-CM

## 2022-12-16 DIAGNOSIS — T81.41XD INFECTION OF SUPERFICIAL INCISIONAL SURGICAL SITE AFTER PROCEDURE, SUBSEQUENT ENCOUNTER: ICD-10-CM

## 2022-12-16 PROCEDURE — 99024 POSTOP FOLLOW-UP VISIT: CPT | Performed by: NURSE PRACTITIONER

## 2022-12-16 NOTE — PROGRESS NOTES
Kaia Couch is a 40 y.o. female  Chief Complaint   Patient presents with    Post OP Follow Up     GBP 11/30/22. Patient presents today for wound check.

## 2022-12-16 NOTE — TELEPHONE ENCOUNTER
I called [facility] to check status on patient home [facility] service which was to start today. Patient was put on priority to be seen today but no guarantee they could make it to her today. Patient will come into office for wound check today and home health stated they will start home health care for patient as stated on referral from [facility] on Sunday 12/18/2022.

## 2022-12-16 NOTE — TELEPHONE ENCOUNTER
Called  home health again and talked to Anne James requesting home health to start tomorrow for patient instead of Sunday. She stated she would call scheduling to make the change. I asked her to call patient to let her know they would be coming tomorrow and she stated someone should call her.

## 2022-12-17 ENCOUNTER — HOME CARE VISIT (OUTPATIENT)
Dept: SCHEDULING | Facility: HOME HEALTH | Age: 37
End: 2022-12-17
Payer: COMMERCIAL

## 2022-12-17 PROCEDURE — G0299 HHS/HOSPICE OF RN EA 15 MIN: HCPCS

## 2022-12-17 NOTE — Clinical Note
Completed [facility] ADM 12/6/22 for abdominal wound care, disease and medication management, SN freq 1d1, 3w1, 2w2 & 2 prn.

## 2022-12-18 VITALS
OXYGEN SATURATION: 98 % | SYSTOLIC BLOOD PRESSURE: 128 MMHG | DIASTOLIC BLOOD PRESSURE: 72 MMHG | TEMPERATURE: 96 F | RESPIRATION RATE: 16 BRPM | HEART RATE: 96 BPM

## 2022-12-19 ENCOUNTER — HOME CARE VISIT (OUTPATIENT)
Dept: SCHEDULING | Facility: HOME HEALTH | Age: 37
End: 2022-12-19
Payer: COMMERCIAL

## 2022-12-19 PROCEDURE — G0299 HHS/HOSPICE OF RN EA 15 MIN: HCPCS

## 2022-12-19 NOTE — PROGRESS NOTES
Bariatric Postoperative Progress Note    CC: Wound Follow Up    Cristinakarina Kitchen is a 40 y.o. female now 16 days status post laparoscopic gastric bypass surgery performed on 2022. Tolerating stage three bariatric diet. Walking daily, reports she is complaint with her bariatric vitamin supplementation, and getting in at least 50 oz fluids daily. Accompanied by her mother, reports pain continues to improve. Did not hear from home health so made appointment today for wound care, dressing change, and to change out packing. Denies n/v, dysphagia, fevers, chills, diarrhea, constipation, or pain in extremities. Weight Loss Metrics 2022   Pre op / Initial Wt 259 - 259 - - 259 -   Today's Wt - 232 lb - 234 lb 236 lb 3.2 oz - 238 lb   BMI - 45.31 kg/m2 - 45.7 kg/m2 46.13 kg/m2 - 46.48 kg/m2   Ideal Body Wt 119 - 119 - - 120 -   Excess Body Wt 140 - 140 - - 139 -   Goal Wt 148 - 148 - - 148 -   Wt loss to date 27 - 25 - - 21 -   % Wt Loss 0.24 - 0.22 - - 0.19 -   80%  - 112 - - 111.2 -         Past Medical History:   Diagnosis Date    Adverse effect of anesthesia     after c sections, pt get very cold and has trembles/shakes     Anemia     HTN (hypertension)        Past Surgical History:   Procedure Laterality Date    HX  SECTION      x4    HX GASTRIC BYPASS  2022    HX GYN      tubal ligation,ablation       Current Outpatient Medications   Medication Sig Dispense Refill    cholecalciferol (VITAMIN D3) (50,000 UNITS /1250 MCG) capsule Take 1 Capsule by mouth every seven (7) days. 12 Capsule 1    multivitamin with iron (FLINTSTONES) chewable tablet Take 1 Tablet by mouth two (2) times a day. calcium citrate-vitamin D3 500 mg-12.5 mcg (500 unit) chew Take 500 mg by mouth three (3) times daily. thiamine HCL (B-1) 100 mg tablet Take  by mouth daily.       cyclobenzaprine (FLEXERIL) 5 mg tablet Take 1 Tablet by mouth three (3) times daily as needed for Muscle Spasm(s). 15 Tablet 0    ondansetron (ZOFRAN ODT) 4 mg disintegrating tablet Take 1 Tablet by mouth every eight (8) hours as needed for Nausea or Vomiting. 12 Tablet 0    atorvastatin (LIPITOR) 20 mg tablet Take 20 mg by mouth daily. cyanocobalamin (VITAMIN B12) 100 mcg tablet Take 100 mcg by mouth daily. No Known Allergies    ROS:  Review of Systems   Constitutional:  Positive for weight loss. Negative for malaise/fatigue. Respiratory: Negative. Cardiovascular:  Negative for chest pain and palpitations. Gastrointestinal:  Positive for abdominal pain. Negative for blood in stool, constipation, diarrhea, heartburn, melena, nausea and vomiting. Skin:  Negative for itching and rash. Neurological:  Negative for dizziness, weakness and headaches. Physicial Exam:  Visit Vitals  /86   Pulse (!) 102   Temp 98.2 °F (36.8 °C) (Temporal)   Resp 18   Ht 5' (1.524 m)   Wt 105.2 kg (232 lb)   SpO2 99%   BMI 45.31 kg/m²     Physical Exam  Vitals and nursing note reviewed. Constitutional:       Appearance: She is obese. HENT:      Head: Normocephalic and atraumatic. Pulmonary:      Effort: Pulmonary effort is normal.   Abdominal:      General: Bowel sounds are normal. There is no distension. Palpations: Abdomen is soft. Tenderness: There is abdominal tenderness. Musculoskeletal:         General: Normal range of motion. Skin:     General: Skin is warm and dry. Neurological:      General: No focal deficit present. Mental Status: She is alert and oriented to person, place, and time.    Psychiatric:         Mood and Affect: Mood normal.         Behavior: Behavior normal.       Labs:   Recent Results (from the past 672 hour(s))   HCG URINE, QL. - POC    Collection Time: 11/30/22 10:32 AM   Result Value Ref Range    Pregnancy test,urine (POC) Negative NEG     CBC W/O DIFF    Collection Time: 12/01/22  5:11 AM   Result Value Ref Range    WBC 19.5 (H) 4.6 - 13.2 K/uL    RBC 4.53 4.20 - 5.30 M/uL    HGB 12.1 12.0 - 16.0 g/dL    HCT 37.5 35.0 - 45.0 %    MCV 82.8 78.0 - 100.0 FL    MCH 26.7 24.0 - 34.0 PG    MCHC 32.3 31.0 - 37.0 g/dL    RDW 14.5 11.6 - 14.5 %    PLATELET 052 (H) 390 - 420 K/uL    MPV 9.1 (L) 9.2 - 11.8 FL    NRBC 0.0 0  WBC    ABSOLUTE NRBC 0.00 0.00 - 1.58 K/uL   METABOLIC PANEL, BASIC    Collection Time: 12/01/22  5:11 AM   Result Value Ref Range    Sodium 134 (L) 136 - 145 mmol/L    Potassium 4.4 3.5 - 5.5 mmol/L    Chloride 99 (L) 100 - 111 mmol/L    CO2 26 21 - 32 mmol/L    Anion gap 9 3.0 - 18 mmol/L    Glucose 138 (H) 74 - 99 mg/dL    BUN 11 7.0 - 18 MG/DL    Creatinine 1.04 0.6 - 1.3 MG/DL    BUN/Creatinine ratio 11 (L) 12 - 20      eGFR >60 >60 ml/min/1.73m2    Calcium 9.2 8.5 - 10.1 MG/DL   MAGNESIUM    Collection Time: 12/01/22  5:11 AM   Result Value Ref Range    Magnesium 1.7 1.6 - 2.6 mg/dL       Assessment:  Appropriately healing superficial incisional surgical site infection    Plan:   Removed gauze covering over wound with minimal clear drainage. Removed calcium alginate rope packing and irrigated with normal saline. Packed approximately half of sterile 2x2 gauze into wound and covered with 4x4 gauze and taped into place. Wound care discussed, change gauze covering every 24 hours or if becomes saturated. Discussed start of care with home health office, they will start skilled nursing wound care tomorrow. Tylenol q6 hours prn. Continue stage 3 bariatric diet, ensure getting in at least 60 g protein daily. Continue with bariatric vitamin supplementation. Continue to increase fluids to 64 oz. Increase activity as tolerated. The primary encounter diagnosis was Encounter for change or removal of wound dressing.  Diagnoses of Infection of superficial incisional surgical site after procedure, subsequent encounter, S/P gastric bypass, and Morbid obesity ([facility] [address] were also pertinent to this visit. Return in one month for wound check, return in 3 months for 3 month bariatric follow up with labs.      Buzz Skaggs, NP

## 2022-12-19 NOTE — HOME HEALTH
Skilled services/Home bound verification:     Skilled Reason for admission/summary of clinical condition:  40 y.o. female now 12 days status post laparoscopic gastric bypass surgery performed on 11/30/2022. Tolerating stage two bariatric diet. Walking daily, reports she is complaint with her bariatric vitamin supplementation, and getting in at least 50 oz fluids daily. Accompanied by her . Completed antibiotics and reports pain continues to improve. Dx: Infection following a procedure, superficial incisional surgical site. This patient is homebound for the following reasons Requires considerable and taxing effort to leave the home  and Only leaves the home for medical reasons or [facility] and are infrequent and of short duration for other reasons. .    Skilled care: Disease and medication management, completed assessment, abdominal wound care; cleansed wound with NS, apt dry, measured, photographed, packed with Aquacel Ag covered with gauze pads, ABD then secured with paper tape. Patient response to procedure performed:  Pt tolerated well during the procedure with min pain. Caregiver:  and sons. Caregiver assists with groceries, run errands, able/willing to assist with daily wound care during non-nursing days, provide reminders with daily medications and accompany to MD appt prn. Medications reconciled and all medications are available in the home this visit. The following education was provided regarding medications: Flexeril S/E: Drowsiness, dizziness, dry mouth, constipation or tiredness. Discussed may obtain OTC Tylenol for prn. (Notify MD if pain is not controlled)  Medications are somewhat effective at this time.        High risk medication teaching regarding anticoagulants, hyperglycemic agents or opoid narcotics performed (specify): N/A   N/A notified of any discrepancies/look a like medications/medication interactions N/A    Home health supplies by type and quantity ordered/delivered this visit include: Guaze pads, NS, cotton tip applicator, Foam dressing, Aquacel Ag (rope) paper tape. Patient education provided this visit to include:  Discussed intervention to prevent infection; hand washing,  wearing face when going to clinic, and avoiding sick person. Keeping wound dressing dry, clean and reinforce dressing when dislodge. Ambulate q 2hrs as tolerated, safety and fall prec; clearing walkway, avoid getting up too quickly when feeling weak, dizzy, and to call for assistance prn. Increasing protein intake; milk, cheese, beans, chicken, beef and fish. May drink Ensure 1-2x/day with less appetite. Continue to follow heart healthy diet. Monitor for S/S of infection; fever 100.4, increase pain, redness, swelling,  coughing with yellow thick sputum, purulent wound drainage with foul smell, cloudy urine with foul smell, not feeling well 2-3 days, SOB, and to call [facility] or MD for assistance if experiencing any of these S/S. To call 911 with chest pains, facial drooping, difficulty talking, non arousable/unconscious and uncontrollable bleeding. Patient level of understanding of education provided: Pt has good understanding of the teaching provided during visit. Sharps Education Provided: N/A     Home exercise program/Homework provided: Ambulation, HEP deep breathing exercises 10x when having SOB, pain and anxiety, IS 10x q 1-2 hrs. Pt/Caregiver instructed on plan of care and are agreeable to plan of care at this time. Physician Dr. Rudy Pro, NP notified of patient admission to home health and plan of care including anticipated frequency of 1d2, 3w2, 2w3, 2 prn and treatments/interventions/modalities of teaching disease and medication management. Discharge planning discussed with patient and caregiver.   Discharge planning as follows: Pt/CG will be able to manage disease and medication independently, and health condition stable Pt/Caregiver did verbalize understanding of discharge planning. Next MD appointment Dr. Rudy Pro NP (pt needs to call for f/u appt)  Patient/caregiver encouraged/instructed to keep appointment as lack of follow through with physician appointment could result in discontinuation  of the service. COVID - 23 Screening completed before visit:       Denies and no family member  has any of these S/S:    Fever, dry cough, sore throat diarrhea, body aches, chills, not feeling well and loss of taste.

## 2022-12-20 VITALS
TEMPERATURE: 97.1 F | OXYGEN SATURATION: 98 % | SYSTOLIC BLOOD PRESSURE: 136 MMHG | DIASTOLIC BLOOD PRESSURE: 76 MMHG | HEART RATE: 86 BPM | RESPIRATION RATE: 16 BRPM

## 2022-12-20 NOTE — HOME HEALTH
Caregiver involvement: family assists with adl's and iadl's prn    Medications reviewed and all medications are available in the home this visit. The following education was provided regarding medications: take medications as orderd, medication indications and potential side effects. MD notified of any discrepancies/look a-like medications/medication interactions: n/a  Medications are effective at this time.       Home health supplies by type and quantity ordered/delivered this visit include: n/a    Patient education provided this visit: as above, increased protein to promote wound healing, paced progressive activity, safe ambulation, when to call RN/ MD, emergency management      Patient level of understanding of education provided: verbalized understanding, needs reinforcement with medication regimen and diet    Skilled Care Performed this visit: VS, GI, , pain, skin, mental assess, teach disease process, wound healing process, medication management    Patient response to procedure performed:  receptive to teaching, states understanding    Agency Progress toward goals: no recent falls, tolerating dressing changes    Patient's Progress towards personal goals: abd open incision healing slowly, taking medications as ordered, pain controlled,     Home exercise program: take medications as ordered, paced progressive activity with rest periods    Continued need for the following skills: Nursing    Plan for next visit: VS, wound care    Patient and/or caregiver notified and agrees to changes in the Plan of Care YES/NO/NA: N/A      The following discharge planning was discussed with the pt/caregiver: d/c when goals met or per patient request

## 2022-12-21 ENCOUNTER — HOME CARE VISIT (OUTPATIENT)
Dept: SCHEDULING | Facility: HOME HEALTH | Age: 37
End: 2022-12-21
Payer: COMMERCIAL

## 2022-12-21 ENCOUNTER — HOME CARE VISIT (OUTPATIENT)
Dept: HOME HEALTH SERVICES | Facility: HOME HEALTH | Age: 37
End: 2022-12-21
Payer: COMMERCIAL

## 2022-12-21 PROCEDURE — G0299 HHS/HOSPICE OF RN EA 15 MIN: HCPCS

## 2022-12-22 VITALS
SYSTOLIC BLOOD PRESSURE: 122 MMHG | OXYGEN SATURATION: 99 % | DIASTOLIC BLOOD PRESSURE: 68 MMHG | HEART RATE: 86 BPM | TEMPERATURE: 97.8 F | RESPIRATION RATE: 20 BRPM

## 2022-12-22 NOTE — HOME HEALTH
Caregiver involvement:family assist with adl's and iadl's prn, including transportation    Medications reviewed and all medications are available in the home this visit. The following education was provided regarding medications:  take medications as ordered. MD notified of any discrepancies/look a-like medications/medication interactions: n/a  Medications are effective at this time.       Home health supplies by type and quantity ordered/delivered this visit include: n/a    Patient education provided this visit: pain control, incision healing process, safe ambulation, medication and disease process management    Sharps education provided: n/a    Patient level of understanding of education provided: states understanding    Skilled Care Performed this visit: VS, GI, , skin,wound, pain assess, disease process and medication management    Patient response to procedure performed: tolerated well with no adverse effects noted    Agency Progress toward goals: no falls, taking medications as ordered    Patient's Progress towards personal goals: wound healing slowly    Home exercise program: take medications as ordered, follow diet, paced progressive activity, when to call RN/ MD, emergency management    Continued need for the following skills: Nursing    Plan for next visit: VS, GI, , skin, pain, wound assess, wound care    Patient and/or caregiver notified and agrees to changes in the Plan of Care YES/NO/NA: N/A      The following discharge planning was discussed with the pt/caregiver: d/c when goals met or per patient request

## 2022-12-23 ENCOUNTER — HOME CARE VISIT (OUTPATIENT)
Dept: SCHEDULING | Facility: HOME HEALTH | Age: 37
End: 2022-12-23
Payer: COMMERCIAL

## 2022-12-23 VITALS
RESPIRATION RATE: 18 BRPM | DIASTOLIC BLOOD PRESSURE: 78 MMHG | OXYGEN SATURATION: 97 % | HEART RATE: 82 BPM | TEMPERATURE: 97.7 F | SYSTOLIC BLOOD PRESSURE: 118 MMHG

## 2022-12-23 PROCEDURE — G0299 HHS/HOSPICE OF RN EA 15 MIN: HCPCS

## 2022-12-24 NOTE — HOME HEALTH
Skilled reason for visit: wound care    Caregiver involvement:  assists with ADLs. Medications reviewed and all medications are available in the home this visit. The following education was provided regarding medications:  SN instructed patient regarding medication Lipitor. SN informed patient / caregiver that Lipitor is a medication that reduces levels of bad cholesterol ( LDL ) and triglyceride in the blood while increasing the level of good cholesterol ( HDL ). SN explained that this medication is used to treat high cholesterol and to lower risk of stroke, heart attack or other heart complication in people with type 2 diabetes. SN informed patient / caregiver that patient should eat low cholesterol diet and avoid drinking alcoholic beverages when taking lipitor because it increase risk of liver damage. SN explained to patient / caregiver that side effects to watch out for are unexplained muscle pain, fever and dark colored urine, swelling urinating less than usual and weight gain. SN instructed patient / caregiver to report any persistence or worsening of side effects. .    MD notified of any discrepancies/look a-like medications/medication interactions: none  Medications are effective at this time. Home health supplies by type and quantity ordered/delivered this visit include: none    Patient education provided this visit: Instructed in materials used in wound care. However, even with proper treatment, a wound infection may occur. Check the wound daily for signs of infection like increased drainage or bleeding from the wound that wont stop with direct pressure, redness in or around the wound, foul odor or pus coming from the wound, increased swelling around the wound and ever above 101.0°F or shaking chills.     Sharps education provided: n/a    Patient level of understanding of education provided: patient and spouse verbalized understanding    Skilled Care Performed this visit: wound care    Patient response to procedure performed:  patient tolerated w/o any c/o pain    Agency Progress toward goals: progressing    Patient's Progress towards personal goals: progressing    Home exercise program: take all medications as prescribed, monitor for s/s of infection to incision site    Continued need for the following skills: Nursing    Plan for next visit: wound care    Patient and/or caregiver notified and agrees to changes in the Plan of Care YES/NO/NA: YES      The following discharge planning was discussed with the pt/caregiver: Discharge when goals are met, incision is healed, patient/caregiver able to manage disease process, mediations, and pain

## 2022-12-26 ENCOUNTER — HOME CARE VISIT (OUTPATIENT)
Dept: SCHEDULING | Facility: HOME HEALTH | Age: 37
End: 2022-12-26
Payer: COMMERCIAL

## 2022-12-26 VITALS
SYSTOLIC BLOOD PRESSURE: 122 MMHG | HEART RATE: 78 BPM | TEMPERATURE: 98.2 F | RESPIRATION RATE: 18 BRPM | DIASTOLIC BLOOD PRESSURE: 80 MMHG | OXYGEN SATURATION: 99 %

## 2022-12-26 PROCEDURE — G0299 HHS/HOSPICE OF RN EA 15 MIN: HCPCS

## 2022-12-26 NOTE — HOME HEALTH
Skilled reason for visit: wound care    Caregiver involvement: Spouse assists with wound management. Medications reviewed and all medications are available in the home this visit. The following education was provided regarding medications:  SN instructed patient / caregiver on Zofran - ondansetron. This medication blocks the actions of chemicals in the body that can trigger nausea and vomiting. Zofran is used to prevent nausea and vomiting that may be caused by surgery, cancer chemotherapy, or radiation treatment. Serious side effects include blurred vision or temporary vision loss ( lasting from only a few minutes to several hours ), slow heart rate, trouble breathing, anxiety, agitation, shivering, feeling like you might pass out, and urinating less than usual or not at all. Stop taking Zofran and call your doctor at once if you have any of these side effects. MD notified of any discrepancies/look a-like medications/medication interactions: none  Medications are effective at this time. Home health supplies by type and quantity ordered/delivered this visit include: none    Patient education provided this visit: Instructed in materials used in wound care. However, even with proper treatment, a wound infection may occur. Check the wound daily for signs of infection like increased drainage or bleeding from the wound that wont stop with direct pressure, redness in or around the wound, foul odor or pus coming from the wound, increased swelling around the wound and ever above 101.0°F or shaking chills.     Sharps education provided: n/a    Patient level of understanding of education provided: Patient verbalized understanding    Skilled Care Performed this visit: wound care    Patient response to procedure performed:  Patient tolerated without any c/o pain     Agency Progress toward goals: progressing    Patient's Progress towards personal goals: progressing    Home exercise program: monitor site for s/s of infection, take all medications as prescribed    Continued need for the following skills: Nursing    Plan for next visit: wound care    Patient and/or caregiver notified and agrees to changes in the Plan of Care YES/NO/NA: YES      The following discharge planning was discussed with the pt/caregiver: Discharge when goals are met, incision is healed, patient/caregiver able to manage disease process, mediations, and pain

## 2022-12-30 ENCOUNTER — HOME CARE VISIT (OUTPATIENT)
Dept: SCHEDULING | Facility: HOME HEALTH | Age: 37
End: 2022-12-30
Payer: COMMERCIAL

## 2022-12-30 VITALS
HEART RATE: 92 BPM | OXYGEN SATURATION: 98 % | DIASTOLIC BLOOD PRESSURE: 88 MMHG | SYSTOLIC BLOOD PRESSURE: 132 MMHG | RESPIRATION RATE: 18 BRPM | TEMPERATURE: 98.3 F

## 2022-12-30 PROCEDURE — G0299 HHS/HOSPICE OF RN EA 15 MIN: HCPCS

## 2022-12-30 NOTE — HOME HEALTH
Skilled reason for visit: wound care    Caregiver involvement:  assist with ADLs. Medications reviewed and all medications are available in the home this visit. The following education was provided regarding medications:  Patient instructed cyanocobalamin is a form of vitamin B12. Vitamin B12 is used to treat vitamin B12 deficiency in people with pernicious anemia and other conditions. Other drugs may interact with cyanocobalamin, including prescription and over-the-counter medicines, vitamins, and herbal products. Tell each of your health care providers about all medicines you use now and any medicine you start or stop using. .    MD notified of any discrepancies/look a-like medications/medication interactions: none  Medications are effective at this time.       Home health supplies by type and quantity ordered/delivered this visit include: none    Patient education provided this visit: monitor site for s/s if infection    Sharps education provided: n/a    Patient level of understanding of education provided: patient verbalize understanding    Skilled Care Performed this visit: wound care    Patient response to procedure performed:  patient tolerated without any c/o pain    Agency Progress toward goals: progressing    Patient's Progress towards personal goals: progressing    Home exercise program: take all medications as prescribed, monitor for s/s if infection    Continued need for the following skills: Nursing    Plan for next visit: wound care    Patient and/or caregiver notified and agrees to changes in the Plan of Care YES/NO/NA: YES      The following discharge planning was discussed with the pt/caregiver: Discharge when goals are met, wound is healed, patient/caregiver able to manage disease process, mediations, and pain

## 2023-01-03 ENCOUNTER — HOME CARE VISIT (OUTPATIENT)
Dept: SCHEDULING | Facility: HOME HEALTH | Age: 38
End: 2023-01-03
Payer: COMMERCIAL

## 2023-01-03 VITALS
SYSTOLIC BLOOD PRESSURE: 122 MMHG | HEART RATE: 79 BPM | TEMPERATURE: 98.9 F | DIASTOLIC BLOOD PRESSURE: 80 MMHG | OXYGEN SATURATION: 99 % | RESPIRATION RATE: 18 BRPM

## 2023-01-03 PROCEDURE — G0299 HHS/HOSPICE OF RN EA 15 MIN: HCPCS

## 2023-01-03 NOTE — HOME HEALTH
Skilled reason for visit: wound care    Caregiver involvement: Patient independent with ADLs, available for assistance    Medications reviewed and all medications are available in the home this visit. The following education was provided regarding medications:  Patient instructed cyanocobalamin is a form of vitamin B12. Vitamin B12 is used to treat vitamin B12 deficiency in people with pernicious anemia and other conditions. Other drugs may interact with cyanocobalamin, including prescription and over-the-counter medicines, vitamins, and herbal products. Tell each of your health care providers about all medicines you use now and any medicine you start or stop using. MD notified of any discrepancies/look a-like medications/medication interactions: none  Medications are effective at this time. Home health supplies by type and quantity ordered/delivered this visit include: n/a    Patient education provided this visit:Instructed in materials used in wound care. However, even with proper treatment, a wound infection may occur. Check the wound daily for signs of infection like increased drainage or bleeding from the wound that wont stop with direct pressure, redness in or around the wound, foul odor or pus coming from the wound, increased swelling around the wound and ever above 101.0°F or shaking chills.     Sharps education provided: n/q    Patient level of understanding of education provided: Patient verbalized understanding    Skilled Care Performed this visit: wound care    Patient response to procedure performed:  patient tolerated with any c/o pain    Agency Progress toward goals: progressing    Patient's Progress towards personal goals: progressing    Home exercise program: take all medications as prescribed, monitor site for s/s of infection    Continued need for the following skills: Nursing    Plan for next visit: wound care    Patient and/or caregiver notified and agrees to changes in the Plan of Care YES/NO/NA: YES      The following discharge planning was discussed with the pt/caregiver: Discharge when goals are met, wound is healed, patient/caregiver able to manage disease process, mediations, and pain

## 2023-01-06 ENCOUNTER — HOME CARE VISIT (OUTPATIENT)
Dept: SCHEDULING | Facility: HOME HEALTH | Age: 38
End: 2023-01-06
Payer: COMMERCIAL

## 2023-01-06 VITALS
DIASTOLIC BLOOD PRESSURE: 90 MMHG | TEMPERATURE: 98.7 F | SYSTOLIC BLOOD PRESSURE: 130 MMHG | OXYGEN SATURATION: 98 % | HEART RATE: 92 BPM | RESPIRATION RATE: 18 BRPM

## 2023-01-06 PROCEDURE — G0299 HHS/HOSPICE OF RN EA 15 MIN: HCPCS

## 2023-01-06 NOTE — HOME HEALTH
Skilled reason for visit: wound care    Caregiver involvement: Independent with ADLs,  assists    Medications reviewed and all medications are available in the home this visit. The following education was provided regarding medications:  SN instructed patient to eat a balanced diet and drink fluids, increase protein and take all prescribed vitamins to promote wound healing. MD notified of any discrepancies/look a-like medications/medication interactions: none  Medications are effective at this time. Home health supplies by type and quantity ordered/delivered this visit include: none    Patient education provided this visit: Instructed in materials used in wound care. However, even with proper treatment, a wound infection may occur. Check the wound daily for signs of infection like increased drainage or bleeding from the wound that wont stop with direct pressure, redness in or around the wound, foul odor or pus coming from the wound, increased swelling around the wound and ever above 101.0°F or shaking chills.     Sharps education provided: n/a    Patient level of understanding of education provided: patient verbalized undersatanding    Skilled Care Performed this visit: wound care    Patient response to procedure performed:  patient tolerated w/o any c/o pain    Agency Progress toward goals: progressing    Patient's Progress towards personal goals: progressing    Home exercise program: take al meds as prescribed    Continued need for the following skills: Nursing    Plan for next visit: discharge    Patient and/or caregiver notified and agrees to changes in the Plan of Care YES/NO/NA: YES      The following discharge planning was discussed with the pt/caregiver: Discharge when goals are met, incision is healed, patient/caregiver able to manage disease process, mediations, and pain

## 2023-01-11 ENCOUNTER — DOCUMENTATION ONLY (OUTPATIENT)
Dept: BARIATRICS/WEIGHT MGMT | Age: 38
End: 2023-01-11

## 2023-01-11 ENCOUNTER — HOSPITAL ENCOUNTER (OUTPATIENT)
Dept: BARIATRICS/WEIGHT MGMT | Age: 38
Discharge: HOME | End: 2023-01-11

## 2023-01-11 NOTE — PROGRESS NOTES
[facility]  POST-OP NUTRITION FOLLOW UP    Patient's Name: Felipe Hand  YOB: 1985  Surgery Date: 22      Procedure: Gastric Bypass    Surgeon: Dr. Priscilla Schreiber    Height: 5 f      Pre-Op Weight: 259     Current Weight: 220  Weight Lost: 39    BMI:  43.1  % EBW lost: 35%    Attendance of support group:   When:   Why not:     Complications  Readmittance: None  Reoperations: None  Complications: Patient states she had an infection, but states it was taken care of in the office  IV Fluids: None  ER Trips: None    Problem Areas:   Nausea: None   Vomitin x:   She states she was eating grilled chic-lesa-A nuggets  Dumping Syndrome: Yes. States she thinks it may have been the sauce on the grilled chicken  Inadequate Protein: None, patient states she is getting a protein water. Inadequate Fluids: Most days, she states she is at 64 ounces. Food Intolerance: None  Hunger: None  Constipation: 2 x. She states she was prescribed Miralax    Eating 3 Meals/Day: Yes  Portion Size at Meals: 1.5 ounces     Protein from Food:     Foods being consumed:  Breakfast: Time:8:00 am:  [place] [facility] santa, eggs. She states she has done protein cereal before. She states she has eaten the meat out of a flat bread before. Lunch: Time: 12:30 pm:   Grilled chicken, chili. She states she has had red beans with turkey sausage before. Dinner:  Time: 6:00 pm:   States she may have hibachi without the rice and noodles. In-between eating: None    Length of time for meals: 20 minutes    food/fluids:     Fluids: states most days 64 oz/day   Types of Fluids:     MVI: She states she was doing Pro Care, but was having trouble getting these down and switched back to the [place] Complete.       Number/Day: tid   Taken Separately: Yes    Vitamin B1: Yes  Dosin mg    Calcium: Calcium citrate pills  (Crushed)   Calcium Dosin mg    Taken Separately: Yes    Vitamin B12: sublingual   Vitamin B12 Dosin mcg    Vitamin D:    Yes  Vitamin D dosin IU    Iron: n/a    Iron dosing:      Protein Supplement: Protein water     Grams of Protein: 30-45   Mixed with:      Splitting Protein Drink in /2:    Timing of Protein Drinks:   Patient is taking 7 days a week. Exercise: walking. States she does not get her 30 minutes in every day because of the weather. She states due to a spot on her stomach, Dr. Mikie Andino told her to wait a longer for activity. Comments:    Patient is 6 weeks post op. Her weight loss is good. She states she had a set back with an infection, but is doing better now. She is eating a variety of different foods, but states she is at 1.5 ounces and just eating the protein. She tried protein cereal, which I have talked to her about avoiding. I discussed with patient that it has 15 grams of protein, but that's in a whole cup, which she isn't able to eat and it has carbohydrates in it. She is taking her vitamins as instructed and getting 30-45 grams of protein from supplements per day. She states she has just been cleared to exercise and we talked about some goals for this. Will monitor her progress. Patient was educated on the importance of eating meat and vegetables only. I have talked with patient about the effects of carbohydrates, not only from a weight management perspective, but also what effects it could have on their blood sugar and what reactive hypoglycemia is. Diet Follow Up:  9 month class scheduled for: Patient stated she wanted to wait and call later to schedule this.       Lucie Reid [address]    2023

## 2023-02-17 DIAGNOSIS — K91.2 POSTOPERATIVE MALABSORPTION: Primary | ICD-10-CM

## 2023-02-23 ENCOUNTER — HOSPITAL ENCOUNTER (OUTPATIENT)
Facility: HOSPITAL | Age: 38
Discharge: HOME | End: 2023-02-26

## 2023-02-23 LAB — LABCORP SPECIMEN COLLECTION: NORMAL

## 2023-02-23 PROCEDURE — 99001 SPECIMEN HANDLING PT-LAB: CPT

## 2023-02-25 LAB
25(OH)D3+25(OH)D2 SERPL-MCNC: 48.9 NG/ML (ref 30–100)
25(OH)D3+25(OH)D2 SERPL-MCNC: 48.9 NG/ML (ref 30–100)
ALBUMIN SERPL-MCNC: 3.9 G/DL (ref 3.8–4.8)
ALBUMIN SERPL-MCNC: 3.9 G/DL (ref 3.8–4.8)
ALBUMIN/GLOB SERPL: 1.1 {RATIO} (ref 1.2–2.2)
ALBUMIN/GLOB SERPL: 1.1 {RATIO} (ref 1.2–2.2)
ALP SERPL-CCNC: 82 IU/L (ref 44–121)
ALP SERPL-CCNC: 82 IU/L (ref 44–121)
ALT SERPL-CCNC: 13 IU/L (ref 0–32)
ALT SERPL-CCNC: 13 IU/L (ref 0–32)
AST SERPL-CCNC: 18 IU/L (ref 0–40)
AST SERPL-CCNC: 18 IU/L (ref 0–40)
BASOPHILS # BLD AUTO: 0 X10E3/UL (ref 0–0.2)
BASOPHILS # BLD AUTO: 0 X10E3/UL (ref 0–0.2)
BASOPHILS NFR BLD AUTO: 1 %
BASOPHILS NFR BLD AUTO: 1 %
BILIRUB SERPL-MCNC: 0.3 MG/DL (ref 0–1.2)
BILIRUB SERPL-MCNC: 0.3 MG/DL (ref 0–1.2)
BUN SERPL-MCNC: 6 MG/DL (ref 6–20)
BUN SERPL-MCNC: 6 MG/DL (ref 6–20)
BUN/CREAT SERPL: 10 (ref 9–23)
BUN/CREAT SERPL: 10 (ref 9–23)
CALCIUM SERPL-MCNC: 9.1 MG/DL (ref 8.7–10.2)
CALCIUM SERPL-MCNC: 9.1 MG/DL (ref 8.7–10.2)
CHLORIDE SERPL-SCNC: 105 MMOL/L (ref 96–106)
CHLORIDE SERPL-SCNC: 105 MMOL/L (ref 96–106)
CO2 SERPL-SCNC: 23 MMOL/L (ref 20–29)
CO2 SERPL-SCNC: 23 MMOL/L (ref 20–29)
CREAT SERPL-MCNC: 0.62 MG/DL (ref 0.57–1)
CREAT SERPL-MCNC: 0.62 MG/DL (ref 0.57–1)
EGFRCR SERPLBLD CKD-EPI 2021: 118 ML/MIN/1.73
EGFRCR SERPLBLD CKD-EPI 2021: 118 ML/MIN/1.73
EOSINOPHIL # BLD AUTO: 0.1 X10E3/UL (ref 0–0.4)
EOSINOPHIL # BLD AUTO: 0.1 X10E3/UL (ref 0–0.4)
EOSINOPHIL NFR BLD AUTO: 1 %
EOSINOPHIL NFR BLD AUTO: 1 %
ERYTHROCYTE [DISTWIDTH] IN BLOOD BY AUTOMATED COUNT: 14.8 % (ref 11.7–15.4)
ERYTHROCYTE [DISTWIDTH] IN BLOOD BY AUTOMATED COUNT: 14.8 % (ref 11.7–15.4)
FERRITIN SERPL-MCNC: 19 NG/ML (ref 15–150)
FERRITIN SERPL-MCNC: 19 NG/ML (ref 15–150)
GLOBULIN SER CALC-MCNC: 3.4 G/DL (ref 1.5–4.5)
GLOBULIN SER CALC-MCNC: 3.4 G/DL (ref 1.5–4.5)
GLUCOSE SERPL-MCNC: 93 MG/DL (ref 70–99)
GLUCOSE SERPL-MCNC: 93 MG/DL (ref 70–99)
HCT VFR BLD AUTO: 33 % (ref 34–46.6)
HCT VFR BLD AUTO: 33 % (ref 34–46.6)
HGB BLD-MCNC: 10.7 G/DL (ref 11.1–15.9)
HGB BLD-MCNC: 10.7 G/DL (ref 11.1–15.9)
IMM GRANULOCYTES # BLD AUTO: 0 X10E3/UL (ref 0–0.1)
IMM GRANULOCYTES # BLD AUTO: 0 X10E3/UL (ref 0–0.1)
IMM GRANULOCYTES NFR BLD AUTO: 0 %
IMM GRANULOCYTES NFR BLD AUTO: 0 %
IRON SERPL-MCNC: 27 UG/DL (ref 27–159)
IRON SERPL-MCNC: 27 UG/DL (ref 27–159)
LYMPHOCYTES # BLD AUTO: 1.4 X10E3/UL (ref 0.7–3.1)
LYMPHOCYTES # BLD AUTO: 1.4 X10E3/UL (ref 0.7–3.1)
LYMPHOCYTES NFR BLD AUTO: 31 %
LYMPHOCYTES NFR BLD AUTO: 31 %
MCH RBC QN AUTO: 26.9 PG (ref 26.6–33)
MCH RBC QN AUTO: 26.9 PG (ref 26.6–33)
MCHC RBC AUTO-ENTMCNC: 32.4 G/DL (ref 31.5–35.7)
MCHC RBC AUTO-ENTMCNC: 32.4 G/DL (ref 31.5–35.7)
MCV RBC AUTO: 83 FL (ref 79–97)
MCV RBC AUTO: 83 FL (ref 79–97)
MONOCYTES # BLD AUTO: 0.5 X10E3/UL (ref 0.1–0.9)
MONOCYTES # BLD AUTO: 0.5 X10E3/UL (ref 0.1–0.9)
MONOCYTES NFR BLD AUTO: 10 %
MONOCYTES NFR BLD AUTO: 10 %
NEUTROPHILS # BLD AUTO: 2.6 X10E3/UL (ref 1.4–7)
NEUTROPHILS # BLD AUTO: 2.6 X10E3/UL (ref 1.4–7)
NEUTROPHILS NFR BLD AUTO: 57 %
NEUTROPHILS NFR BLD AUTO: 57 %
PLATELET # BLD AUTO: 374 X10E3/UL (ref 150–450)
PLATELET # BLD AUTO: 374 X10E3/UL (ref 150–450)
POTASSIUM SERPL-SCNC: 4.1 MMOL/L (ref 3.5–5.2)
POTASSIUM SERPL-SCNC: 4.1 MMOL/L (ref 3.5–5.2)
PROT SERPL-MCNC: 7.3 G/DL (ref 6–8.5)
PROT SERPL-MCNC: 7.3 G/DL (ref 6–8.5)
RBC # BLD AUTO: 3.98 X10E6/UL (ref 3.77–5.28)
RBC # BLD AUTO: 3.98 X10E6/UL (ref 3.77–5.28)
SODIUM SERPL-SCNC: 141 MMOL/L (ref 134–144)
SODIUM SERPL-SCNC: 141 MMOL/L (ref 134–144)
SPECIMEN STATUS REPORT, ROLRST: NORMAL
SPECIMEN STATUS REPORT: NORMAL
VIT B1 BLD-SCNC: 67.7 NMOL/L (ref 66.5–200)
VIT B1 BLD-SCNC: 67.7 NMOL/L (ref 66.5–200)
VIT B12 SERPL-MCNC: 1093 PG/ML (ref 232–1245)
VIT B12 SERPL-MCNC: 1093 PG/ML (ref 232–1245)
WBC # BLD AUTO: 4.6 X10E3/UL (ref 3.4–10.8)
WBC # BLD AUTO: 4.6 X10E3/UL (ref 3.4–10.8)

## 2023-03-10 ENCOUNTER — OFFICE VISIT (OUTPATIENT)
Age: 38
End: 2023-03-10

## 2023-03-10 VITALS
TEMPERATURE: 98.2 F | BODY MASS INDEX: 39.66 KG/M2 | RESPIRATION RATE: 18 BRPM | OXYGEN SATURATION: 100 % | HEIGHT: 60 IN | HEART RATE: 72 BPM | DIASTOLIC BLOOD PRESSURE: 106 MMHG | SYSTOLIC BLOOD PRESSURE: 154 MMHG | WEIGHT: 202 LBS

## 2023-03-10 DIAGNOSIS — Z98.84 S/P GASTRIC BYPASS: ICD-10-CM

## 2023-03-10 DIAGNOSIS — E66.9 CLASS 2 OBESITY WITHOUT SERIOUS COMORBIDITY WITH BODY MASS INDEX (BMI) OF 39.0 TO 39.9 IN ADULT, UNSPECIFIED OBESITY TYPE: ICD-10-CM

## 2023-03-10 DIAGNOSIS — K91.2 POST-RESECTION MALABSORPTION: ICD-10-CM

## 2023-03-10 DIAGNOSIS — K91.2 POST-RESECTION MALABSORPTION: Primary | ICD-10-CM

## 2023-03-10 RX ORDER — MULTIVIT-MIN/IRON/FOLIC ACID/K 45-800-120
CAPSULE ORAL
COMMUNITY

## 2023-03-10 ASSESSMENT — ENCOUNTER SYMPTOMS
EYES NEGATIVE: 1
CONSTIPATION: 0
VOMITING: 0
DIARRHEA: 0
ABDOMINAL PAIN: 0
COUGH: 0
BLOOD IN STOOL: 0
SHORTNESS OF BREATH: 0
NAUSEA: 0

## 2023-03-10 NOTE — PROGRESS NOTES
Bariatric Postoperative Progress Note    CC: 3 Month LGBP Follow Up    Fozia Baldwin is a 40 y.o. female now 3 months status post laparoscopic gastric bypass surgery performed on 2022. Doing well overall. Currently eating salad, chicken, salmon, broccoli, turkey sausage. Taking in 70+ oz sugar free fluids,  unknown g protein. 30 min of walking 2 days a week. Bowel movements are regular. She is compliant with multivitamins, calcium, Vit D and B12 supplements. Hx uterine ablation prior to gastric bypass but has started experiencing menstrual cycles again the past three months. 4 sons in spring sports (golf, track, etc.), excited to be more involved in their events due to weight loss and improved mobility. Denies any NSAID, ETOH, or tobacco use. Weight Loss Metrics 3/10/2023   Pre-op weight (manual entry) 259 lbs   Height 5' 0\"   Weight 202 lbs   BMI (Calculated) 39.5 kg/m2   Ideal body weight (manual entry) 120 lbs   EBW in lbs. 139   Weight loss to date in lbs.  57   Percent weight loss (%) 22.01 %   Percent EBW loss (%) 41 %   Some recent data might be hidden     Comorbidities:  Hypertension: improved  Diabetes: not applicable  Obstructive Sleep Apnea: not applicable  Hyperlipidemia: improved  Stress Urinary Incontinence: not applicable  Gastroesophageal Reflux: resolved  Weight related arthropathy:not applicable      Past Medical History:   Diagnosis Date    Hyperlipidemia     Hypertension        Past Surgical History:   Procedure Laterality Date     SECTION      X 4    ENDOMETRIAL ABLATION      GASTRIC BYPASS SURGERY  2022    TUBAL LIGATION         Current Outpatient Medications   Medication Sig Dispense Refill    Multiple Vitamins-Minerals (BARIATRIC MULTIVITAMINS/IRON) CAPS Take by mouth      Calcium Citrate-Vitamin D 500-10 MG-MCG CHEW Take 500 mg by mouth in the morning, at noon, and at bedtime      vitamin D (CHOLECALCIFEROL) 04093 UNIT CAPS Take 50,000 Units by mouth every 7 days      ondansetron (ZOFRAN-ODT) 4 MG disintegrating tablet Take 4 mg by mouth every 8 hours as needed       No current facility-administered medications for this visit. No Known Allergies    ROS:  Review of Systems   Constitutional:  Negative for chills, fatigue and fever. Eyes: Negative. Respiratory:  Negative for cough and shortness of breath. Cardiovascular:  Negative for chest pain and palpitations. Gastrointestinal:  Negative for abdominal pain, blood in stool, constipation, diarrhea, nausea and vomiting. Neurological:  Positive for headaches. Negative for dizziness and numbness. Physical Exam:  Vitals:    03/10/23 1015 03/10/23 1033   BP: (!) 148/96 (!) 154/106   Site: Right Upper Arm Left Upper Arm   Position: Sitting Sitting   Cuff Size: Large Adult Large Adult   Pulse: 72    Resp: 18    Temp: 98.2 °F (36.8 °C)    TempSrc: Temporal    SpO2: 100%    Weight: 202 lb (91.6 kg)    Height: 5' (1.524 m)       Body mass index is 39.45 kg/m². Physical Exam  Vitals and nursing note reviewed. Constitutional:       Appearance: She is obese. HENT:      Head: Normocephalic and atraumatic. Cardiovascular:      Rate and Rhythm: Normal rate and regular rhythm. Heart sounds: Normal heart sounds. Pulmonary:      Effort: Pulmonary effort is normal.      Breath sounds: Normal breath sounds. Abdominal:      General: Abdomen is flat. Bowel sounds are normal. There is no distension. Palpations: Abdomen is soft. There is no mass. Tenderness: There is no abdominal tenderness. Hernia: No hernia is present. Musculoskeletal:         General: Normal range of motion. Skin:     General: Skin is warm and dry. Neurological:      General: No focal deficit present. Mental Status: She is alert and oriented to person, place, and time.    Psychiatric:         Mood and Affect: Mood normal.         Behavior: Behavior normal.        Labs:   Recent Results (from the past 672 hour(s))   CBC/DIFF AMBIGUOUS DEFAULT    Collection Time: 02/23/23 12:00 AM   Result Value Ref Range    WBC 4.6 3.4 - 10.8 x10E3/uL    RBC 3.98 3.77 - 5.28 x10E6/uL    Hemoglobin 10.7 (L) 11.1 - 15.9 g/dL    Hematocrit 33.0 (L) 34.0 - 46.6 %    MCV 83 79 - 97 fL    MCH 26.9 26.6 - 33.0 pg    MCHC 32.4 31.5 - 35.7 g/dL    RDW 14.8 11.7 - 15.4 %    Platelets 771 358 - 032 x10E3/uL    Neutrophils % 57 Not Estab. %    Lymphocytes % 31 Not Estab. %    Monocytes % 10 Not Estab. %    Eosinophils % 1 Not Estab. %    Basophils % 1 Not Estab. %    Neutrophils Absolute 2.6 1.4 - 7.0 x10E3/uL    Lymphocytes Absolute 1.4 0.7 - 3.1 x10E3/uL    Monocytes Absolute 0.5 0.1 - 0.9 x10E3/uL    Eosinophils Absolute 0.1 0.0 - 0.4 x10E3/uL    Basophils Absolute 0.0 0.0 - 0.2 x10E3/uL    Immature Granulocytes 0 Not Estab. %    Immature Grans (Abs) 0.0 0.0 - 0.1 x10E3/uL   Comprehensive Metabolic Panel    Collection Time: 02/23/23 12:00 AM   Result Value Ref Range    Glucose 93 70 - 99 mg/dL    BUN 6 6 - 20 mg/dL    Creatinine 0.62 0.57 - 1.00 mg/dL    Est, Glomerular Filtration Rate 118 >59 mL/min/1.73    BUN/Creatinine Ratio 10 9 - 23    Sodium 141 134 - 144 mmol/L    Potassium 4.1 3.5 - 5.2 mmol/L    Chloride 105 96 - 106 mmol/L    CO2 23 20 - 29 mmol/L    Calcium 9.1 8.7 - 10.2 mg/dL    Total Protein 7.3 6.0 - 8.5 g/dL    Albumin 3.9 3.8 - 4.8 g/dL    Globulin, Total 3.4 1.5 - 4.5 g/dL    Albumin/Globulin Ratio 1.1 (L) 1.2 - 2.2    Total Bilirubin 0.3 0.0 - 1.2 mg/dL    Alkaline Phosphatase 82 44 - 121 IU/L    AST 18 0 - 40 IU/L    ALT 13 0 - 32 IU/L   Vitamin B1, Whole Blood    Collection Time: 02/23/23 12:00 AM   Result Value Ref Range    Vitamin B1,Whole Blood 67.7 66.5 - 200.0 nmol/L   Vitamin D 25 Hydroxy    Collection Time: 02/23/23 12:00 AM   Result Value Ref Range    Vit D, 25-Hydroxy 48.9 30.0 - 100.0 ng/mL   Iron    Collection Time: 02/23/23 12:00 AM   Result Value Ref Range    Iron 27 27 - 159 ug/dL   Ferritin    Collection Time: 02/23/23 12:00 AM   Result Value Ref Range    Ferritin 19 15 - 150 ng/mL   Vitamin B12    Collection Time: 02/23/23 12:00 AM   Result Value Ref Range    Vitamin B-12 1093 232 - 1245 pg/mL   SPECIMEN STATUS REPORT    Collection Time: 02/23/23 12:00 AM   Result Value Ref Range    Specimen Status Report COMMENT    [facility] Specimen Collection    Collection Time: 02/23/23  9:00 AM   Result Value Ref Range    [facility] Specimen Collection        Specimens collected/sent to [facility]. Please direct inquiries to ([phone]). Assessment/Plan:   She is currently 3 months s/p laparoscopic gastric bypass surgery  with a total weight loss of 57 lbs to date, doing well. Labs were reviewed, significant for normocytic anemia, pt reports she is taking Procare Bariatric MVI with 45 mg iron. May need to incorporate additional OTC 65 mg iron due to restarting menstrual cycles. BP elevated in office, pt reports periodic HA but has been monitoring BP at home and has been running 110-120s/70s. Discussed to continue monitoring BP at home and log measurements. It no improvement, follow up with PCP. We have reviewed the components of a successful postoperative course including requirement for a high protein, low carbohydrate diet, 64 oz a day of zero calorie liquids, daily vitamin supplementation, daily exercise (150 mis/week), regular follow-up, and participation in support groups. Refer to registered dietitian for more detailed medical nutrition therapy as needed. The primary encounter diagnosis was Post-resection malabsorption. Diagnoses of S/P gastric bypass and Class 2 obesity without serious comorbidity with body mass index (BMI) of 39.0 to 39.9 in adult, unspecified obesity type were also pertinent to this visit. Return in about 3 months (around 6/10/2023) for 6 Month Bariatric Follow Up. with labs, sooner as needed.   SRINATH Miller - NP

## 2023-03-10 NOTE — PROGRESS NOTES
Marvin Flynn has been given the following recommendations today due to her elevated BP reading referred to Alternative/PCP

## 2023-03-15 ENCOUNTER — TELEPHONE (OUTPATIENT)
Age: 38
End: 2023-03-15

## 2023-03-15 NOTE — TELEPHONE ENCOUNTER
Patient called to let ANNETTA OSEI know that her blood pressure is still running high and has been all weekend. Today it was 153/103 in her left arm. She went off of her blood pressure medication when she had her  GBP surgery on 11/30/2022 and has not started back on it. I advised patient to call her PCP to schedule appt to go over her blood pressure and to monitor it since he is the one that has been following her blood pressure and put her on the medication. I also told her I would advise ANNETTA OSEI of her elevated BP today as soon as she gets out of the OR today. Patient verbalized understanding and will call her PCP now. Please advise.

## 2023-07-20 ENCOUNTER — HOSPITAL ENCOUNTER (OUTPATIENT)
Facility: HOSPITAL | Age: 38
Discharge: HOME | End: 2023-07-20
Payer: MEDICAID

## 2023-07-20 LAB
25(OH)D3 SERPL-MCNC: 31.2 NG/ML (ref 30–100)
ALBUMIN SERPL-MCNC: 3.5 G/DL (ref 3.4–5)
ALBUMIN/GLOB SERPL: 0.9 (ref 0.8–1.7)
ALP SERPL-CCNC: 82 U/L (ref 45–117)
ALT SERPL-CCNC: 16 U/L (ref 13–56)
ANION GAP SERPL CALC-SCNC: 4 MMOL/L (ref 3–18)
AST SERPL-CCNC: 14 U/L (ref 10–38)
BASOPHILS # BLD: 0 K/UL (ref 0–0.1)
BASOPHILS NFR BLD: 1 % (ref 0–2)
BILIRUB SERPL-MCNC: 0.4 MG/DL (ref 0.2–1)
BUN SERPL-MCNC: 11 MG/DL (ref 7–18)
BUN/CREAT SERPL: 15 (ref 12–20)
CALCIUM SERPL-MCNC: 8.7 MG/DL (ref 8.5–10.1)
CHLORIDE SERPL-SCNC: 106 MMOL/L (ref 100–111)
CHOLEST SERPL-MCNC: 162 MG/DL
CO2 SERPL-SCNC: 28 MMOL/L (ref 21–32)
CREAT SERPL-MCNC: 0.73 MG/DL (ref 0.6–1.3)
DIFFERENTIAL METHOD BLD: ABNORMAL
EOSINOPHIL # BLD: 0.1 K/UL (ref 0–0.4)
EOSINOPHIL NFR BLD: 1 % (ref 0–5)
ERYTHROCYTE [DISTWIDTH] IN BLOOD BY AUTOMATED COUNT: 13.6 % (ref 11.6–14.5)
FERRITIN SERPL-MCNC: 8 NG/ML (ref 8–388)
GLOBULIN SER CALC-MCNC: 3.8 G/DL (ref 2–4)
GLUCOSE SERPL-MCNC: 83 MG/DL (ref 74–99)
HCT VFR BLD AUTO: 35.5 % (ref 35–45)
HDLC SERPL-MCNC: 43 MG/DL (ref 40–60)
HDLC SERPL: 3.8 (ref 0–5)
HGB BLD-MCNC: 11.3 G/DL (ref 12–16)
IMM GRANULOCYTES # BLD AUTO: 0 K/UL (ref 0–0.04)
IMM GRANULOCYTES NFR BLD AUTO: 0 % (ref 0–0.5)
IRON SERPL-MCNC: 29 UG/DL (ref 50–175)
LDLC SERPL CALC-MCNC: 111.8 MG/DL (ref 0–100)
LIPID PANEL: ABNORMAL
LYMPHOCYTES # BLD: 1.6 K/UL (ref 0.9–3.6)
LYMPHOCYTES NFR BLD: 30 % (ref 21–52)
MCH RBC QN AUTO: 27.4 PG (ref 24–34)
MCHC RBC AUTO-ENTMCNC: 31.8 G/DL (ref 31–37)
MCV RBC AUTO: 86.2 FL (ref 78–100)
MONOCYTES # BLD: 0.5 K/UL (ref 0.05–1.2)
MONOCYTES NFR BLD: 9 % (ref 3–10)
NEUTS SEG # BLD: 3 K/UL (ref 1.8–8)
NEUTS SEG NFR BLD: 58 % (ref 40–73)
NRBC # BLD: 0 K/UL (ref 0–0.01)
NRBC BLD-RTO: 0 PER 100 WBC
PLATELET # BLD AUTO: 414 K/UL (ref 135–420)
PMV BLD AUTO: 9.8 FL (ref 9.2–11.8)
POTASSIUM SERPL-SCNC: 4.2 MMOL/L (ref 3.5–5.5)
PROT SERPL-MCNC: 7.3 G/DL (ref 6.4–8.2)
RBC # BLD AUTO: 4.12 M/UL (ref 4.2–5.3)
SODIUM SERPL-SCNC: 138 MMOL/L (ref 136–145)
TRIGL SERPL-MCNC: 36 MG/DL
VIT B12 SERPL-MCNC: 765 PG/ML (ref 211–911)
VLDLC SERPL CALC-MCNC: 7.2 MG/DL
WBC # BLD AUTO: 5.2 K/UL (ref 4.6–13.2)

## 2023-07-20 PROCEDURE — 80053 COMPREHEN METABOLIC PANEL: CPT

## 2023-07-20 PROCEDURE — 36415 COLL VENOUS BLD VENIPUNCTURE: CPT

## 2023-07-20 PROCEDURE — 85025 COMPLETE CBC W/AUTO DIFF WBC: CPT

## 2023-07-20 PROCEDURE — 83540 ASSAY OF IRON: CPT

## 2023-07-20 PROCEDURE — 80061 LIPID PANEL: CPT

## 2023-07-20 PROCEDURE — 82607 VITAMIN B-12: CPT

## 2023-07-20 PROCEDURE — 82728 ASSAY OF FERRITIN: CPT

## 2023-07-20 PROCEDURE — 82306 VITAMIN D 25 HYDROXY: CPT

## 2023-07-20 PROCEDURE — 84425 ASSAY OF VITAMIN B-1: CPT

## 2023-07-21 ENCOUNTER — OFFICE VISIT (OUTPATIENT)
Age: 38
End: 2023-07-21
Payer: COMMERCIAL

## 2023-07-21 VITALS
SYSTOLIC BLOOD PRESSURE: 135 MMHG | HEART RATE: 76 BPM | DIASTOLIC BLOOD PRESSURE: 91 MMHG | OXYGEN SATURATION: 97 % | BODY MASS INDEX: 33.57 KG/M2 | RESPIRATION RATE: 18 BRPM | WEIGHT: 171 LBS | HEIGHT: 60 IN | TEMPERATURE: 97.7 F

## 2023-07-21 DIAGNOSIS — K91.2 POST-RESECTION MALABSORPTION: Primary | ICD-10-CM

## 2023-07-21 DIAGNOSIS — I10 ESSENTIAL (PRIMARY) HYPERTENSION: ICD-10-CM

## 2023-07-21 DIAGNOSIS — Z98.84 S/P GASTRIC BYPASS: ICD-10-CM

## 2023-07-21 DIAGNOSIS — E66.9 CLASS 1 OBESITY WITH SERIOUS COMORBIDITY AND BODY MASS INDEX (BMI) OF 33.0 TO 33.9 IN ADULT, UNSPECIFIED OBESITY TYPE: ICD-10-CM

## 2023-07-21 PROCEDURE — 3074F SYST BP LT 130 MM HG: CPT | Performed by: NURSE PRACTITIONER

## 2023-07-21 PROCEDURE — 99213 OFFICE O/P EST LOW 20 MIN: CPT | Performed by: NURSE PRACTITIONER

## 2023-07-21 PROCEDURE — 3078F DIAST BP <80 MM HG: CPT | Performed by: NURSE PRACTITIONER

## 2023-07-21 RX ORDER — AMLODIPINE BESYLATE 10 MG/1
TABLET ORAL
COMMUNITY
Start: 2023-07-05

## 2023-07-21 ASSESSMENT — ENCOUNTER SYMPTOMS
VOMITING: 0
BLOOD IN STOOL: 0
CONSTIPATION: 0
ANAL BLEEDING: 0
NAUSEA: 0

## 2023-07-21 NOTE — PROGRESS NOTES
Bariatric Postoperative Progress Note    CC: 7 Month Bariatric Follow Up    Viktor Ho is a 45 y.o. female now 7 months status post laparoscopic gastric bypass surgery performed on 2022. Doing well overall. Diet consists of chicken, fish, salad, turkey montalvo. Taking in 64 oz sugar free fluids,  unknown g protein. 30 min of activity 2 days a week. Bowel movements are regular. She is compliant with recommended bariatric vitamin supplementation. continue recommended bariatric vitamin supplementation. Now having regular menstrual cycles, has been following up with GYN and may have another uterine ablation. Started taking BP medication in  due to HA and elevated BP readings at home. Brother murdered about a month ago and has been working through her grief. Denies any NSAID, ETOH, or tobacco use. Weight Loss Metrics 2023 3/10/2023 2022 2022 2022 2022 2022   Pre-op weight (manual entry) 259 lbs 259 lbs - - - - -   Height 5' 0\" 5' 0\" 5' 0\" 5' 0\" 5' 0\" 5' 0\" 5' 0\"   Weight 171 lbs 202 lbs 232 lbs 234 lbs 236 lbs 3 oz 238 lbs 243 lbs   BMI (Calculated) 33.5 kg/m2 39.5 kg/m2 45.4 kg/m2 45.8 kg/m2 46.2 kg/m2 46.6 kg/m2 47.6 kg/m2   Ideal body weight (manual entry) 120 lbs 120 lbs - - - - -   EBW in lbs. 139 139 - - - - -   Weight loss to date in lbs.  88 57 - - - - -   Percent weight loss (%) 33.98 % 22.01 % - - - - -   Percent EBW loss (%) 63.3 % 41 % - - - - -   Some recent data might be hidden       Comorbidities:  Hypertension: improved  Diabetes: not applicable  Obstructive Sleep Apnea: not applicable  Hyperlipidemia: improved  Stress Urinary Incontinence: improved  Gastroesophageal Reflux: resolved  Weight related arthropathy:not applicable      Past Medical History:   Diagnosis Date    Hyperlipidemia     Hypertension        Past Surgical History:   Procedure Laterality Date     SECTION      X 4    ENDOMETRIAL ABLATION      GASTRIC BYPASS SURGERY

## 2023-07-22 ASSESSMENT — ENCOUNTER SYMPTOMS
DIARRHEA: 0
WHEEZING: 0
SHORTNESS OF BREATH: 0
EYES NEGATIVE: 1
ABDOMINAL DISTENTION: 0
COUGH: 0
ABDOMINAL PAIN: 0

## 2023-07-24 LAB — VIT B1 BLD-SCNC: 77.3 NMOL/L (ref 66.5–200)

## 2023-10-03 NOTE — NURSE NAVIGATOR
Per MBSAQIP requirements:  Letter and email sent requesting follow up appointment. [facility] [place]      Dear Patient,    Your health is our main concern. It is important for your health to have follow-up lab work and to see your surgeon at 3 months, 6 months and annually after your weight loss surgery. Additionally, the Department of bariatric Surgery at our hospital is a member of the Metabolic and Bariatric Surgery Accreditation and Quality Improvement Program [facility]). As a participant in this program, we gather information on the outcomes of our patients after surgery. Please call the office for a follow up appointment at [phone] [facility]) or [phone] [facility]). If you have moved out of the area or have changed surgeons please call us and let us know the name of your doctor. Your health and feedback are important to us. We greatly appreciate your response.        Thank you,  [facility] [address]

## 2024-01-02 NOTE — PROGRESS NOTES
Bariatric Postoperative Note    CC: 13-month bariatric follow-up    Jennifer Tirado is a 38 y.o. female now 13 months status post laparoscopic gastric bypass surgery performed on 11/30/2022.    Doing well overall.  Focusing on high-protein/low-carb bariatric diet, taking in 64 oz sugar free fluids,  unknown g protein.  Not currently exercising.  Bowel movements are regular. She is compliant with recommended bariatric vitamin supplementation.  Weight rhonda of 157 pounds since last visit  Reports blood pressure at home has been running 140s/90s and she has also been experiencing periodic headaches.  She reports that she was told by PCP to follow-up with our office on if we would like to restart blood pressure medications since we initially took her off right after surgery  Experiencing itching and skin irritation under pannus and in between thighs due to excess skin chafing even with fastidious skin care, use of support garments, and use of over-the-counter products    ETOH (sip wine occasionally) denies tobacco and NSAIDs.       1/3/2024    10:42 AM 7/21/2023    10:39 AM 3/10/2023    10:15 AM 12/16/2022    10:50 AM 12/14/2022     9:48 AM 12/12/2022     8:35 AM 12/9/2022     2:01 PM   Weight Loss Metrics   Pre-op weight (manual entry) 259 lbs 259 lbs 259 lbs       Height 5' 0\" 5' 0\" 5' 0\" 5' 0\" 5' 0\" 5' 0\" 5' 0\"   Weight - Scale 159 lbs 171 lbs 202 lbs 232 lbs 234 lbs 236 lbs 3 oz 238 lbs   BMI (Calculated) 31.1 kg/m2 33.5 kg/m2 39.5 kg/m2 45.4 kg/m2 45.8 kg/m2 46.2 kg/m2 46.6 kg/m2   Ideal body weight (manual entry) 120 lbs 120 lbs 120 lbs       EBW in lbs. 139 139 139       Weight loss to date in lbs. 100 88 57       Percent weight loss (%) 38.61 % 33.98 % 22.01 %       Percent EBW loss (%) 71.9 % 63.3 % 41 %           Comorbidities:  Hypertension: improved  Diabetes: not applicable  Obstructive Sleep Apnea: not applicable  Hyperlipidemia: resolved  Stress Urinary Incontinence: resolved  Gastroesophageal Reflux:

## 2024-01-03 ENCOUNTER — OFFICE VISIT (OUTPATIENT)
Age: 39
End: 2024-01-03

## 2024-01-03 VITALS
TEMPERATURE: 97.4 F | HEIGHT: 60 IN | HEART RATE: 78 BPM | RESPIRATION RATE: 17 BRPM | BODY MASS INDEX: 31.22 KG/M2 | OXYGEN SATURATION: 99 % | DIASTOLIC BLOOD PRESSURE: 91 MMHG | WEIGHT: 159 LBS | SYSTOLIC BLOOD PRESSURE: 136 MMHG

## 2024-01-03 DIAGNOSIS — L98.7 EXCESSIVE AND REDUNDANT SKIN AND SUBCUTANEOUS TISSUE: ICD-10-CM

## 2024-01-03 DIAGNOSIS — I10 HYPERTENSION, UNSPECIFIED TYPE: ICD-10-CM

## 2024-01-03 DIAGNOSIS — K91.2 POSTOPERATIVE INTESTINAL MALABSORPTION: Primary | ICD-10-CM

## 2024-01-03 DIAGNOSIS — Z98.84 S/P GASTRIC BYPASS: ICD-10-CM

## 2024-01-03 DIAGNOSIS — E66.9 CLASS 1 OBESITY WITH SERIOUS COMORBIDITY AND BODY MASS INDEX (BMI) OF 31.0 TO 31.9 IN ADULT, UNSPECIFIED OBESITY TYPE: ICD-10-CM

## 2024-01-03 RX ORDER — AMLODIPINE BESYLATE 5 MG/1
5 TABLET ORAL DAILY
Qty: 90 TABLET | Refills: 1 | Status: SHIPPED | OUTPATIENT
Start: 2024-01-03

## 2024-01-03 RX ORDER — NYSTATIN 100000 [USP'U]/G
POWDER TOPICAL
Qty: 30 G | Refills: 2 | Status: SHIPPED | OUTPATIENT
Start: 2024-01-03

## 2024-01-03 ASSESSMENT — ENCOUNTER SYMPTOMS
ABDOMINAL PAIN: 0
NAUSEA: 0
BLOOD IN STOOL: 0
CONSTIPATION: 0
ABDOMINAL DISTENTION: 0
EYES NEGATIVE: 1
WHEEZING: 0
SHORTNESS OF BREATH: 0
VOMITING: 0
COUGH: 0
DIARRHEA: 0

## 2024-01-05 ASSESSMENT — ENCOUNTER SYMPTOMS: ROS SKIN COMMENTS: SEE HPI

## 2024-02-19 ENCOUNTER — HOSPITAL ENCOUNTER (OUTPATIENT)
Facility: HOSPITAL | Age: 39
Discharge: HOME | End: 2024-02-22

## 2024-02-19 LAB — LABCORP SPECIMEN COLLECTION: NORMAL

## 2024-02-20 LAB
25(OH)D3+25(OH)D2 SERPL-MCNC: 30.8 NG/ML (ref 30–100)
ALBUMIN SERPL-MCNC: 4.1 G/DL (ref 3.9–4.9)
ALBUMIN/GLOB SERPL: 1.5 {RATIO} (ref 1.2–2.2)
ALP SERPL-CCNC: 75 IU/L (ref 44–121)
ALT SERPL-CCNC: 13 IU/L (ref 0–32)
AST SERPL-CCNC: 20 IU/L (ref 0–40)
BASOPHILS # BLD AUTO: 0.1 X10E3/UL (ref 0–0.2)
BASOPHILS NFR BLD AUTO: 2 %
BILIRUB SERPL-MCNC: 0.4 MG/DL (ref 0–1.2)
BUN SERPL-MCNC: 9 MG/DL (ref 6–20)
BUN/CREAT SERPL: 12 (ref 9–23)
CALCIUM SERPL-MCNC: 9.2 MG/DL (ref 8.7–10.2)
CHLORIDE SERPL-SCNC: 103 MMOL/L (ref 96–106)
CHOLEST SERPL-MCNC: 142 MG/DL (ref 100–199)
CO2 SERPL-SCNC: 22 MMOL/L (ref 20–29)
CREAT SERPL-MCNC: 0.77 MG/DL (ref 0.57–1)
EGFRCR SERPLBLD CKD-EPI 2021: 101 ML/MIN/1.73
EOSINOPHIL # BLD AUTO: 0.1 X10E3/UL (ref 0–0.4)
EOSINOPHIL NFR BLD AUTO: 2 %
ERYTHROCYTE [DISTWIDTH] IN BLOOD BY AUTOMATED COUNT: 13.2 % (ref 11.7–15.4)
FERRITIN SERPL-MCNC: 6 NG/ML (ref 15–150)
GLOBULIN SER CALC-MCNC: 2.8 G/DL (ref 1.5–4.5)
GLUCOSE SERPL-MCNC: 91 MG/DL (ref 70–99)
HBA1C MFR BLD: 5.6 % (ref 4.8–5.6)
HCT VFR BLD AUTO: 33.6 % (ref 34–46.6)
HDLC SERPL-MCNC: 53 MG/DL
HGB BLD-MCNC: 10.4 G/DL (ref 11.1–15.9)
IMM GRANULOCYTES # BLD AUTO: 0 X10E3/UL (ref 0–0.1)
IMM GRANULOCYTES NFR BLD AUTO: 0 %
IRON SERPL-MCNC: 31 UG/DL (ref 27–159)
LDLC SERPL CALC-MCNC: 80 MG/DL (ref 0–99)
LYMPHOCYTES # BLD AUTO: 1.3 X10E3/UL (ref 0.7–3.1)
LYMPHOCYTES NFR BLD AUTO: 43 %
MCH RBC QN AUTO: 26.2 PG (ref 26.6–33)
MCHC RBC AUTO-ENTMCNC: 31 G/DL (ref 31.5–35.7)
MCV RBC AUTO: 85 FL (ref 79–97)
MONOCYTES # BLD AUTO: 0.3 X10E3/UL (ref 0.1–0.9)
MONOCYTES NFR BLD AUTO: 9 %
NEUTROPHILS # BLD AUTO: 1.3 X10E3/UL (ref 1.4–7)
NEUTROPHILS NFR BLD AUTO: 44 %
PLATELET # BLD AUTO: 419 X10E3/UL (ref 150–450)
POTASSIUM SERPL-SCNC: 4 MMOL/L (ref 3.5–5.2)
PROT SERPL-MCNC: 6.9 G/DL (ref 6–8.5)
RBC # BLD AUTO: 3.97 X10E6/UL (ref 3.77–5.28)
SODIUM SERPL-SCNC: 140 MMOL/L (ref 134–144)
SPECIMEN STATUS REPORT: NORMAL
TRIGL SERPL-MCNC: 36 MG/DL (ref 0–149)
VIT B12 SERPL-MCNC: 875 PG/ML (ref 232–1245)
VLDLC SERPL CALC-MCNC: 9 MG/DL (ref 5–40)
WBC # BLD AUTO: 3.1 X10E3/UL (ref 3.4–10.8)

## 2024-02-28 LAB — VIT B1 BLD-SCNC: 65.9 NMOL/L (ref 66.5–200)

## 2024-03-04 ENCOUNTER — CLINICAL DOCUMENTATION (OUTPATIENT)
Facility: HOSPITAL | Age: 39
End: 2024-03-04

## 2024-03-04 ENCOUNTER — TELEPHONE (OUTPATIENT)
Age: 39
End: 2024-03-04

## 2024-03-04 DIAGNOSIS — E51.9 THIAMINE DEFICIENCY: ICD-10-CM

## 2024-03-04 DIAGNOSIS — K91.2 POSTOPERATIVE INTESTINAL MALABSORPTION: Primary | ICD-10-CM

## 2024-03-04 DIAGNOSIS — D50.9 IRON DEFICIENCY ANEMIA, UNSPECIFIED IRON DEFICIENCY ANEMIA TYPE: ICD-10-CM

## 2024-03-04 NOTE — TELEPHONE ENCOUNTER
Please call to let her know about lab results.   JARAD, ensure her MVI contains iron. If so, ensure she is  her MVI and calcium by 2 hours.   Low B1 (thiamine), would recommend coming in for B1 shot.

## 2024-03-04 NOTE — PROGRESS NOTES
3/4/24:  Patient was contacted regarding her recent labs.      Patient is 15 months post op gastric bypass.    She states she is taking the Bariatric Vitamin from us.  She was not sure of the exact name of it, but felt that it was Pro Care.  She states she is taking it most days, but may sometimes miss a day and is taking it between 5-7 times a week.  Normal level: 66.5-200.0.  Patient's level: 65.9.  Patient has been instructed to add in 100 mg of Vitamin B1 per day. Patient is asymptomatic.  Patient was also encouraged to come in for a B1 shot.  She would like to come tomorrow as she will already be in the office tomorrow.        I also discussed her iron with her.  Normal: .  Patient's level is 31.  Patient is ensure if her Pro Care has iron in it.  She states she bought 4 bottles from our office.  I talked with patient that if she doesn't want what she currently has to go to waste, I have advised her to add in 65 mg of ferrous sulfate per day, taken with Vitamin C daily.  She was also advised to separate this by 2 hours from her calcium  Patient states she had not been  it and has been taking it all together.  If her Pro Care does have iron in it, check that she is getting 45 mg of it and avoid taking her multivitamin with iron with her calcium supplements.    Will retest iron and B1 in 3 months.    Lucinda Rodriguez, MS RD

## 2024-03-04 NOTE — TELEPHONE ENCOUNTER
Spoke to pt she is stating the dietician has already spoken to her about mvi with iron and  calcium from mvi by 2 hours she is aware of low thiamine and scheduled for injection at our office tomorrow

## 2024-03-05 ENCOUNTER — NURSE ONLY (OUTPATIENT)
Age: 39
End: 2024-03-05
Payer: MEDICAID

## 2024-03-05 DIAGNOSIS — E51.9 THIAMINE DEFICIENCY: Primary | ICD-10-CM

## 2024-03-05 PROCEDURE — 96372 THER/PROPH/DIAG INJ SC/IM: CPT | Performed by: NURSE PRACTITIONER

## 2024-03-05 RX ORDER — THIAMINE HYDROCHLORIDE 100 MG/ML
200 INJECTION, SOLUTION INTRAMUSCULAR; INTRAVENOUS ONCE
Status: COMPLETED | OUTPATIENT
Start: 2024-03-05 | End: 2024-03-05

## 2024-03-05 RX ADMIN — THIAMINE HYDROCHLORIDE 200 MG: 100 INJECTION, SOLUTION INTRAMUSCULAR; INTRAVENOUS at 09:34

## 2024-03-05 NOTE — PROGRESS NOTES
Patient identified by name and date of birth     100 milligram vit b1 I'm into right deltoid and 100 milligram into left deltoid without difficulty    Appt with Lea Rodriguez next today and she will go in details regarding vitamin regimen      Not taking the mvi with iron will purchase the procare with iron today    Pt states an understanding of all instructions

## 2024-05-16 ENCOUNTER — APPOINTMENT (OUTPATIENT)
Facility: HOSPITAL | Age: 39
End: 2024-05-16
Payer: MEDICAID

## 2024-05-16 ENCOUNTER — HOSPITAL ENCOUNTER (EMERGENCY)
Facility: HOSPITAL | Age: 39
Discharge: HOME | End: 2024-05-16
Attending: STUDENT IN AN ORGANIZED HEALTH CARE EDUCATION/TRAINING PROGRAM
Payer: MEDICAID

## 2024-05-16 VITALS
HEART RATE: 81 BPM | OXYGEN SATURATION: 99 % | SYSTOLIC BLOOD PRESSURE: 149 MMHG | TEMPERATURE: 98.1 F | DIASTOLIC BLOOD PRESSURE: 106 MMHG | RESPIRATION RATE: 17 BRPM | HEIGHT: 60 IN | BODY MASS INDEX: 29.45 KG/M2 | WEIGHT: 150 LBS

## 2024-05-16 DIAGNOSIS — R05.1 ACUTE COUGH: Primary | ICD-10-CM

## 2024-05-16 DIAGNOSIS — J06.9 UPPER RESPIRATORY TRACT INFECTION, UNSPECIFIED TYPE: ICD-10-CM

## 2024-05-16 LAB
DEPRECATED S PYO AG THROAT QL EIA: NEGATIVE
FLUAV AG NPH QL IA: NEGATIVE
FLUBV AG NOSE QL IA: NEGATIVE
SARS-COV-2 RDRP RESP QL NAA+PROBE: NOT DETECTED
SOURCE: NORMAL

## 2024-05-16 PROCEDURE — 87635 SARS-COV-2 COVID-19 AMP PRB: CPT

## 2024-05-16 PROCEDURE — 87070 CULTURE OTHR SPECIMN AEROBIC: CPT

## 2024-05-16 PROCEDURE — 87880 STREP A ASSAY W/OPTIC: CPT

## 2024-05-16 PROCEDURE — 99284 EMERGENCY DEPT VISIT MOD MDM: CPT

## 2024-05-16 PROCEDURE — 87804 INFLUENZA ASSAY W/OPTIC: CPT

## 2024-05-16 PROCEDURE — 71046 X-RAY EXAM CHEST 2 VIEWS: CPT

## 2024-05-16 RX ORDER — AMOXICILLIN 500 MG/1
500 CAPSULE ORAL 3 TIMES DAILY
Qty: 30 CAPSULE | Refills: 0 | Status: SHIPPED | OUTPATIENT
Start: 2024-05-16 | End: 2024-05-26

## 2024-05-16 ASSESSMENT — PAIN - FUNCTIONAL ASSESSMENT: PAIN_FUNCTIONAL_ASSESSMENT: NONE - DENIES PAIN

## 2024-05-16 ASSESSMENT — ENCOUNTER SYMPTOMS: COUGH: 1

## 2024-05-16 NOTE — ED TRIAGE NOTES
Patient reports cough, congestion, ear pain, and sore throat since Saturday. States she went to [facility] on Tuesday for covid and strep swabs--both resulted negative. Prescribed prednisone and taking Tylenol and Coricidin.

## 2024-05-16 NOTE — ED PROVIDER NOTES
EMERGENCY DEPARTMENT HISTORY AND PHYSICAL EXAM    10:45 AM      Date: 2024  Patient Name: Jennifer Tirado    History of Presenting Illness     Chief Complaint   Patient presents with    Cough    Otalgia       History From: Patient    Jennifer Tirado is a 38 y.o. female   38-year-old female here for evaluation of respiratory symptoms.  Patient reports developing nasal congestion as well as sore throat and ear discomfort.  She was seen by urgent care a few days ago.  She was started on steroids.  She is also been taking over-the-counter Claritin.  Was not prescribed antibiotics.  At that time she had a negative COVID and flu testing.  Her symptoms seem to be getting worse and now she has a cough that comes here for evaluation.  She states that she had pneumonia in the past.  No lower extremity swelling history of DVT or PE.  No chest pain/dyspnea           Nursing Notes were all reviewed and agreed with or any disagreements were addressed in the HPI.    PCP: Monica Calderon, SRINATH - NP    No current facility-administered medications for this encounter.     Current Outpatient Medications   Medication Sig Dispense Refill    amoxicillin (AMOXIL) 500 MG capsule Take 1 capsule by mouth 3 times daily for 10 days 30 capsule 0    amLODIPine (NORVASC) 5 MG tablet Take 1 tablet by mouth daily 90 tablet 1    nystatin (MYCOSTATIN) 793167 UNIT/GM powder Apply to affected area four times daily as needed for itching 30 g 2    Multiple Vitamins-Minerals (BARIATRIC MULTIVITAMINS/IRON) CAPS Take by mouth      Calcium Citrate-Vitamin D 500-10 MG-MCG CHEW Take 500 mg by mouth in the morning, at noon, and at bedtime      vitamin D (CHOLECALCIFEROL) 77342 UNIT CAPS Take 1 capsule by mouth every 7 days         Past History     Past Medical History:  Past Medical History:   Diagnosis Date    Hyperlipidemia     Hypertension        Past Surgical History:  Past Surgical History:   Procedure Laterality Date     SECTION      X 4

## 2024-05-18 LAB
BACTERIA SPEC CULT: NORMAL
SERVICE CMNT-IMP: NORMAL

## 2024-07-14 DIAGNOSIS — I10 HYPERTENSION, UNSPECIFIED TYPE: ICD-10-CM

## 2024-07-14 DIAGNOSIS — E66.9 CLASS 1 OBESITY WITH SERIOUS COMORBIDITY AND BODY MASS INDEX (BMI) OF 31.0 TO 31.9 IN ADULT, UNSPECIFIED OBESITY TYPE: ICD-10-CM

## 2024-07-17 RX ORDER — AMLODIPINE BESYLATE 5 MG/1
5 TABLET ORAL DAILY
Qty: 90 TABLET | Refills: 1 | OUTPATIENT
Start: 2024-07-17

## 2024-07-19 ENCOUNTER — HOSPITAL ENCOUNTER (OUTPATIENT)
Facility: HOSPITAL | Age: 39
Discharge: HOME | End: 2024-07-22

## 2024-07-19 DIAGNOSIS — E66.9 CLASS 1 OBESITY WITH SERIOUS COMORBIDITY AND BODY MASS INDEX (BMI) OF 31.0 TO 31.9 IN ADULT, UNSPECIFIED OBESITY TYPE: ICD-10-CM

## 2024-07-19 DIAGNOSIS — I10 HYPERTENSION, UNSPECIFIED TYPE: ICD-10-CM

## 2024-07-19 LAB — LABCORP SPECIMEN COLLECTION: NORMAL

## 2024-07-19 PROCEDURE — 99001 SPECIMEN HANDLING PT-LAB: CPT

## 2024-07-24 RX ORDER — AMLODIPINE BESYLATE 5 MG/1
5 TABLET ORAL DAILY
Qty: 90 TABLET | Refills: 1 | OUTPATIENT
Start: 2024-07-24

## 2024-07-25 ENCOUNTER — TELEPHONE (OUTPATIENT)
Age: 39
End: 2024-07-25

## 2024-07-25 DIAGNOSIS — I10 HYPERTENSION, UNSPECIFIED TYPE: Primary | ICD-10-CM

## 2024-07-25 RX ORDER — AMLODIPINE BESYLATE 5 MG/1
5 TABLET ORAL DAILY
Qty: 30 TABLET | Refills: 0 | Status: SHIPPED | OUTPATIENT
Start: 2024-07-25

## 2024-07-25 NOTE — TELEPHONE ENCOUNTER
Spoke to pt she is stating her pcp told her to get rx with our office explained refill to be provided for a 30 day supply and she will call her pcp to explain she will need future rx with them.  Patient states an understanding.

## 2024-07-25 NOTE — TELEPHONE ENCOUNTER
Patient calling about her prescription of amlodipine 5 mg tablets, needs refill sent to [facility] pharmacy on [address]

## 2024-08-16 ENCOUNTER — OFFICE VISIT (OUTPATIENT)
Age: 39
End: 2024-08-16
Payer: MEDICAID

## 2024-08-16 VITALS
SYSTOLIC BLOOD PRESSURE: 135 MMHG | BODY MASS INDEX: 31.22 KG/M2 | WEIGHT: 159 LBS | RESPIRATION RATE: 16 BRPM | HEIGHT: 60 IN | TEMPERATURE: 98.2 F | DIASTOLIC BLOOD PRESSURE: 86 MMHG | OXYGEN SATURATION: 98 % | HEART RATE: 91 BPM

## 2024-08-16 DIAGNOSIS — D50.9 IRON DEFICIENCY ANEMIA, UNSPECIFIED IRON DEFICIENCY ANEMIA TYPE: ICD-10-CM

## 2024-08-16 DIAGNOSIS — E66.9 CLASS 1 OBESITY WITH SERIOUS COMORBIDITY AND BODY MASS INDEX (BMI) OF 31.0 TO 31.9 IN ADULT, UNSPECIFIED OBESITY TYPE: ICD-10-CM

## 2024-08-16 DIAGNOSIS — Z98.84 S/P GASTRIC BYPASS: ICD-10-CM

## 2024-08-16 DIAGNOSIS — I10 HYPERTENSION, UNSPECIFIED TYPE: ICD-10-CM

## 2024-08-16 DIAGNOSIS — L98.7 EXCESSIVE AND REDUNDANT SKIN AND SUBCUTANEOUS TISSUE: ICD-10-CM

## 2024-08-16 DIAGNOSIS — K91.2 POSTOPERATIVE INTESTINAL MALABSORPTION: ICD-10-CM

## 2024-08-16 DIAGNOSIS — K91.2 POSTOPERATIVE INTESTINAL MALABSORPTION: Primary | ICD-10-CM

## 2024-08-16 PROCEDURE — 3075F SYST BP GE 130 - 139MM HG: CPT | Performed by: NURSE PRACTITIONER

## 2024-08-16 PROCEDURE — 3079F DIAST BP 80-89 MM HG: CPT | Performed by: NURSE PRACTITIONER

## 2024-08-16 PROCEDURE — 99214 OFFICE O/P EST MOD 30 MIN: CPT | Performed by: NURSE PRACTITIONER

## 2024-08-16 RX ORDER — AMLODIPINE BESYLATE 5 MG/1
5 TABLET ORAL DAILY
Qty: 30 TABLET | Refills: 1 | Status: SHIPPED | OUTPATIENT
Start: 2024-08-16

## 2024-08-16 RX ORDER — NYSTATIN 100000 [USP'U]/G
POWDER TOPICAL
Qty: 30 G | Refills: 2 | Status: SHIPPED | OUTPATIENT
Start: 2024-08-16

## 2024-08-16 ASSESSMENT — ENCOUNTER SYMPTOMS
ABDOMINAL DISTENTION: 0
VOMITING: 0
NAUSEA: 0
SHORTNESS OF BREATH: 0
EYES NEGATIVE: 1
COUGH: 0
BLOOD IN STOOL: 0
ROS SKIN COMMENTS: SEE HPI
DIARRHEA: 0
CONSTIPATION: 0
ABDOMINAL PAIN: 0
WHEEZING: 0

## 2024-08-16 NOTE — PROGRESS NOTES
Bariatric Postoperative Progress Note    Chief Complaint   Patient presents with    Weight Management    Follow-up     LGBP 11/30/22       History of Present Illness  The patient is a 39-year-old female who presents for bariatric surgery follow up and lab review. She is status post laparoscopic gastric bypass surgery performed on 11/30/2022.    She reports overall good health and is maintaining her weight well. She ensures adequate protein intake of at least 60 grams and hydration of at least 64 ounces daily. She takes a multivitamin and B1 supplement daily, and calcium twice a day, ensuring a two-hour gap between the calcium and multivitamin doses.     She is requesting a refill of her blood pressure medication and is planning to establish with a new PCP in September. She monitors her blood pressure at home, which she reports as normal.    She experienced heavy menstrual cycles in the past, which ceased for two years following ablation, but have since returned to regularity. She plans to consult her gynecologist regarding this issue.    She has undergone four C-sections and has noticed increased abdominal skin sagging since her weight loss. She notes loose skin on her stomach and arms which causes itching and skin irritation even with skin care, use of support garments, and Nystatin (requesting refill).  She is considering plastic surgery for her loose skin and is interested in a list of potential surgeons.    See nurse note for additional subjective information.         8/16/2024     2:18 PM 5/16/2024     9:25 AM 1/3/2024    10:42 AM 7/21/2023    10:39 AM 3/10/2023    10:15 AM 12/16/2022    10:50 AM 12/14/2022     9:48 AM   Weight Loss Metrics   Pre-op weight (manual entry) 259 lbs  259 lbs 259 lbs 259 lbs     Height 5' 0\" 5' 0\" 5' 0\" 5' 0\" 5' 0\" 5' 0\" 5' 0\"   Weight - Scale 159 lbs 150 lbs 159 lbs 171 lbs 202 lbs 232 lbs 234 lbs   BMI (Calculated) 31.1 kg/m2 29.4 kg/m2 31.1 kg/m2 33.5 kg/m2 39.5 kg/m2 45.4 kg/m2

## 2024-08-16 NOTE — PATIENT INSTRUCTIONS
Dr. Mathew Edmonds [facility]  [address]  [place] [address]  [phone]     Dr. Nelda Perkins  [facility]  [address] [place] [address]    [address]  [place] [address]  [phone]    Dr. Nathan Hernandez  [address], [address]  [place] [address]  [phone]    Dr. BRYAN Zacarias  [facility]  [address] [place] [address], [address]   [place] [address] [phone]    Dr. Chester Kiser  [facility]   [address]  [place] [address]  [phone]    Dr. Janette Clay  [facility]  [address]  [place] [address] [phone]    [facility]  Multiple locations in [place], [place]  [phone]

## 2024-08-16 NOTE — PROGRESS NOTES
Bariatric Postoperative Nurse Note      Jennifer Tirado is a 39 y.o. female status post laparoscopic gastric bypass surgery performed on 11/30/22.    All Post-Ops (including two weeks)  -# of grams of protein daily? Unsure of amount  -sources of protein? Chicken and fish  -# of oz of sugar free fluids from all sources daily?  lots of water, unsure of amount  -Nausea? No  -Vomiting? No  -Difficulty swallow/food sticking? No  -Heartburn/regurgitation? No  -Character of bowel movements (diarrhea/constipation/bloody stools?) regular  -Which multivitamin product are you taking?  Bariatric multivitamin    -What dose and how frequently are you taking calcium citrate? 500 mg   - from any iron-containing multivitamin by 2 hours? No  -Ulcer risk exposures:   NSAID No  Tobacco No  Alcohol No  Steroids No  -Minutes of physical activity and what type? needs to work on this, does walking while kids are at football practice

## 2024-08-21 DIAGNOSIS — I10 HYPERTENSION, UNSPECIFIED TYPE: ICD-10-CM

## 2024-08-22 RX ORDER — AMLODIPINE BESYLATE 5 MG/1
5 TABLET ORAL DAILY
Qty: 30 TABLET | Refills: 1 | OUTPATIENT
Start: 2024-08-22

## 2024-09-06 ENCOUNTER — TELEPHONE (OUTPATIENT)
Facility: CLINIC | Age: 39
End: 2024-09-06

## 2024-09-27 ENCOUNTER — OFFICE VISIT (OUTPATIENT)
Facility: CLINIC | Age: 39
End: 2024-09-27
Payer: MEDICAID

## 2024-09-27 VITALS
HEART RATE: 80 BPM | SYSTOLIC BLOOD PRESSURE: 138 MMHG | HEIGHT: 60 IN | WEIGHT: 159 LBS | OXYGEN SATURATION: 99 % | DIASTOLIC BLOOD PRESSURE: 93 MMHG | BODY MASS INDEX: 31.22 KG/M2

## 2024-09-27 DIAGNOSIS — I10 PRIMARY HYPERTENSION: Primary | ICD-10-CM

## 2024-09-27 DIAGNOSIS — E66.9 CLASS 1 OBESITY WITH SERIOUS COMORBIDITY AND BODY MASS INDEX (BMI) OF 31.0 TO 31.9 IN ADULT, UNSPECIFIED OBESITY TYPE: ICD-10-CM

## 2024-09-27 DIAGNOSIS — L98.7 EXCESSIVE AND REDUNDANT SKIN AND SUBCUTANEOUS TISSUE: ICD-10-CM

## 2024-09-27 DIAGNOSIS — N92.0 MENORRHAGIA WITH REGULAR CYCLE: ICD-10-CM

## 2024-09-27 DIAGNOSIS — E55.9 VITAMIN D DEFICIENCY: ICD-10-CM

## 2024-09-27 DIAGNOSIS — D50.9 IRON DEFICIENCY ANEMIA, UNSPECIFIED IRON DEFICIENCY ANEMIA TYPE: ICD-10-CM

## 2024-09-27 DIAGNOSIS — Z98.84 S/P GASTRIC BYPASS: ICD-10-CM

## 2024-09-27 PROCEDURE — 3075F SYST BP GE 130 - 139MM HG: CPT | Performed by: STUDENT IN AN ORGANIZED HEALTH CARE EDUCATION/TRAINING PROGRAM

## 2024-09-27 PROCEDURE — 3080F DIAST BP >= 90 MM HG: CPT | Performed by: STUDENT IN AN ORGANIZED HEALTH CARE EDUCATION/TRAINING PROGRAM

## 2024-09-27 PROCEDURE — 99204 OFFICE O/P NEW MOD 45 MIN: CPT | Performed by: STUDENT IN AN ORGANIZED HEALTH CARE EDUCATION/TRAINING PROGRAM

## 2024-09-27 RX ORDER — AMLODIPINE BESYLATE 5 MG/1
5 TABLET ORAL DAILY
Qty: 30 TABLET | Refills: 1 | Status: SHIPPED | OUTPATIENT
Start: 2024-09-27 | End: 2024-09-27

## 2024-09-27 RX ORDER — MULTIVIT-MIN/IRON/FOLIC ACID/K 45-800-120
CAPSULE ORAL
Qty: 90 CAPSULE | Refills: 3 | Status: SHIPPED | OUTPATIENT
Start: 2024-09-27

## 2024-09-27 RX ORDER — FERROUS SULFATE 325(65) MG
325 TABLET ORAL
Qty: 90 TABLET | Refills: 1 | Status: SHIPPED | OUTPATIENT
Start: 2024-09-27

## 2024-09-27 RX ORDER — NYSTATIN 100000 [USP'U]/G
POWDER TOPICAL
Qty: 30 G | Refills: 2 | Status: SHIPPED | OUTPATIENT
Start: 2024-09-27

## 2024-09-27 RX ORDER — AMLODIPINE BESYLATE 10 MG/1
10 TABLET ORAL DAILY
Qty: 90 TABLET | Refills: 1 | Status: SHIPPED | OUTPATIENT
Start: 2024-09-27

## 2024-09-27 SDOH — ECONOMIC STABILITY: FOOD INSECURITY: WITHIN THE PAST 12 MONTHS, THE FOOD YOU BOUGHT JUST DIDN'T LAST AND YOU DIDN'T HAVE MONEY TO GET MORE.: NEVER TRUE

## 2024-09-27 SDOH — ECONOMIC STABILITY: INCOME INSECURITY: HOW HARD IS IT FOR YOU TO PAY FOR THE VERY BASICS LIKE FOOD, HOUSING, MEDICAL CARE, AND HEATING?: NOT HARD AT ALL

## 2024-09-27 SDOH — ECONOMIC STABILITY: FOOD INSECURITY: WITHIN THE PAST 12 MONTHS, YOU WORRIED THAT YOUR FOOD WOULD RUN OUT BEFORE YOU GOT MONEY TO BUY MORE.: NEVER TRUE

## 2024-09-27 ASSESSMENT — PATIENT HEALTH QUESTIONNAIRE - PHQ9
2. FEELING DOWN, DEPRESSED OR HOPELESS: NOT AT ALL
SUM OF ALL RESPONSES TO PHQ9 QUESTIONS 1 & 2: 0
SUM OF ALL RESPONSES TO PHQ QUESTIONS 1-9: 0
SUM OF ALL RESPONSES TO PHQ QUESTIONS 1-9: 0
1. LITTLE INTEREST OR PLEASURE IN DOING THINGS: NOT AT ALL
SUM OF ALL RESPONSES TO PHQ QUESTIONS 1-9: 0
SUM OF ALL RESPONSES TO PHQ QUESTIONS 1-9: 0

## 2024-09-27 ASSESSMENT — ANXIETY QUESTIONNAIRES
IF YOU CHECKED OFF ANY PROBLEMS ON THIS QUESTIONNAIRE, HOW DIFFICULT HAVE THESE PROBLEMS MADE IT FOR YOU TO DO YOUR WORK, TAKE CARE OF THINGS AT HOME, OR GET ALONG WITH OTHER PEOPLE: NOT DIFFICULT AT ALL
2. NOT BEING ABLE TO STOP OR CONTROL WORRYING: NOT AT ALL
4. TROUBLE RELAXING: NOT AT ALL
7. FEELING AFRAID AS IF SOMETHING AWFUL MIGHT HAPPEN: NOT AT ALL
1. FEELING NERVOUS, ANXIOUS, OR ON EDGE: NOT AT ALL
5. BEING SO RESTLESS THAT IT IS HARD TO SIT STILL: NOT AT ALL
3. WORRYING TOO MUCH ABOUT DIFFERENT THINGS: NOT AT ALL

## 2024-09-27 ASSESSMENT — ENCOUNTER SYMPTOMS
COUGH: 0
WHEEZING: 0
RHINORRHEA: 0
CHEST TIGHTNESS: 0
NAUSEA: 0
BACK PAIN: 0
SHORTNESS OF BREATH: 0
ABDOMINAL PAIN: 0
VOMITING: 0
BLOOD IN STOOL: 0
DIARRHEA: 0

## 2024-10-18 ENCOUNTER — TELEPHONE (OUTPATIENT)
Age: 39
End: 2024-10-18

## 2024-10-18 DIAGNOSIS — Z98.84 S/P GASTRIC BYPASS: Primary | ICD-10-CM

## 2024-10-18 DIAGNOSIS — L98.7 EXCESSIVE AND REDUNDANT SKIN AND SUBCUTANEOUS TISSUE: ICD-10-CM

## 2024-10-18 NOTE — TELEPHONE ENCOUNTER
Patient states the original doctor she was referred to for plastic surgery does not accept her insurance. She does have one who does Dr. Fu[facility] in [place]. Phone [phone] Fax [phone]

## 2024-10-21 ENCOUNTER — TELEPHONE (OUTPATIENT)
Age: 39
End: 2024-10-21

## 2024-11-08 ENCOUNTER — OFFICE VISIT (OUTPATIENT)
Facility: CLINIC | Age: 39
End: 2024-11-08
Payer: MEDICAID

## 2024-11-08 VITALS
HEART RATE: 92 BPM | OXYGEN SATURATION: 99 % | DIASTOLIC BLOOD PRESSURE: 81 MMHG | SYSTOLIC BLOOD PRESSURE: 128 MMHG | WEIGHT: 156 LBS | HEIGHT: 60 IN | BODY MASS INDEX: 30.63 KG/M2

## 2024-11-08 DIAGNOSIS — E55.9 VITAMIN D DEFICIENCY: ICD-10-CM

## 2024-11-08 DIAGNOSIS — R73.03 PREDIABETES: ICD-10-CM

## 2024-11-08 DIAGNOSIS — Z11.59 ENCOUNTER FOR HEPATITIS C SCREENING TEST FOR LOW RISK PATIENT: ICD-10-CM

## 2024-11-08 DIAGNOSIS — D50.9 IRON DEFICIENCY ANEMIA, UNSPECIFIED IRON DEFICIENCY ANEMIA TYPE: ICD-10-CM

## 2024-11-08 DIAGNOSIS — I10 PRIMARY HYPERTENSION: Primary | ICD-10-CM

## 2024-11-08 DIAGNOSIS — E78.00 PURE HYPERCHOLESTEROLEMIA: ICD-10-CM

## 2024-11-08 DIAGNOSIS — L98.7 EXCESSIVE AND REDUNDANT SKIN AND SUBCUTANEOUS TISSUE: ICD-10-CM

## 2024-11-08 DIAGNOSIS — Z98.84 S/P GASTRIC BYPASS: ICD-10-CM

## 2024-11-08 DIAGNOSIS — E66.811 CLASS 1 OBESITY WITH SERIOUS COMORBIDITY AND BODY MASS INDEX (BMI) OF 31.0 TO 31.9 IN ADULT, UNSPECIFIED OBESITY TYPE: ICD-10-CM

## 2024-11-08 PROCEDURE — 3079F DIAST BP 80-89 MM HG: CPT | Performed by: STUDENT IN AN ORGANIZED HEALTH CARE EDUCATION/TRAINING PROGRAM

## 2024-11-08 PROCEDURE — 99214 OFFICE O/P EST MOD 30 MIN: CPT | Performed by: STUDENT IN AN ORGANIZED HEALTH CARE EDUCATION/TRAINING PROGRAM

## 2024-11-08 PROCEDURE — 3074F SYST BP LT 130 MM HG: CPT | Performed by: STUDENT IN AN ORGANIZED HEALTH CARE EDUCATION/TRAINING PROGRAM

## 2024-11-08 SDOH — ECONOMIC STABILITY: FOOD INSECURITY: WITHIN THE PAST 12 MONTHS, THE FOOD YOU BOUGHT JUST DIDN'T LAST AND YOU DIDN'T HAVE MONEY TO GET MORE.: NEVER TRUE

## 2024-11-08 SDOH — ECONOMIC STABILITY: FOOD INSECURITY: WITHIN THE PAST 12 MONTHS, YOU WORRIED THAT YOUR FOOD WOULD RUN OUT BEFORE YOU GOT MONEY TO BUY MORE.: NEVER TRUE

## 2024-11-08 SDOH — ECONOMIC STABILITY: INCOME INSECURITY: HOW HARD IS IT FOR YOU TO PAY FOR THE VERY BASICS LIKE FOOD, HOUSING, MEDICAL CARE, AND HEATING?: NOT HARD AT ALL

## 2024-11-08 ASSESSMENT — PATIENT HEALTH QUESTIONNAIRE - PHQ9
SUM OF ALL RESPONSES TO PHQ QUESTIONS 1-9: 0
2. FEELING DOWN, DEPRESSED OR HOPELESS: NOT AT ALL
1. LITTLE INTEREST OR PLEASURE IN DOING THINGS: NOT AT ALL
SUM OF ALL RESPONSES TO PHQ9 QUESTIONS 1 & 2: 0
SUM OF ALL RESPONSES TO PHQ QUESTIONS 1-9: 0

## 2024-11-08 ASSESSMENT — ENCOUNTER SYMPTOMS
ABDOMINAL PAIN: 0
BACK PAIN: 0
SHORTNESS OF BREATH: 0
DIARRHEA: 0
BLOOD IN STOOL: 0
NAUSEA: 0
RHINORRHEA: 0
WHEEZING: 0
CHEST TIGHTNESS: 0
COUGH: 0
VOMITING: 0

## 2024-11-08 NOTE — PROGRESS NOTES
Jennifer Tirado is a 39 y.o. year old female who presents today for   Chief Complaint   Patient presents with    Follow-up     PT presents for f/u visit.         Is someone accompanying this pt? no    Is the patient using any DME equipment during OV? no    Depression Screenin/8/2024     3:04 PM 2024     3:10 PM   PHQ-9 Questionaire   Little interest or pleasure in doing things 0 0   Feeling down, depressed, or hopeless 0 0   PHQ-9 Total Score 0 0       Abuse Screening:       No data to display                Learning Assessment:  No question data found.    Fall Risk:       No data to display                    Coordination of Care:   1. \"Have you been to the ER, urgent care clinic since your last visit?  Hospitalized since your last visit?\" no    2. \"Have you seen or consulted any other health care providers outside of the [facility] since your last visit?\" no    3. For patients aged 45-75: Has the patient had a colonoscopy / FIT/ Cologuard? N/A    If the patient is female:    4. For patients aged 40-74: Has the patient had a mammogram within the past 2 years? N/A    5. For patients aged 21-65: Has the patient had a pap smear? Yes     Health Maintenance: reviewed and discussed and ordered per Provider.    Health Maintenance Due   Topic Date Due    Varicella vaccine (1 of 2 - 13+ 2-dose series) Never done    HIV screen  Never done    Hepatitis C screen  Never done    Hepatitis B vaccine (1 of 3 - 19+ 3-dose series) Never done    DTaP/Tdap/Td vaccine (1 - Tdap) Never done    Cervical cancer screen  Never done    Flu vaccine (1) 2024    COVID-19 Vaccine ( - - season) Never done        -JOSÉ MIGUEL CASTILLO   [facility]        Click Here for Release of Records Request

## 2024-11-08 NOTE — PROGRESS NOTES
Jennifer Tirado is a 39 y.o. female presenting today for Follow-up (PT presents for f/u visit.  )  .     Chief Complaint   Patient presents with    Follow-up     PT presents for f/u visit.         HPI:  Jennifer Tirado presents to the office today for follow-up.    Patient has a past medical history of HTN, HLD, obesity s/p gastric bypass surgery in 11/2022.    Patient is following with bariatric team.  She was recently seen on 8/16/2024.  Preop weight was 259 pounds.  Weight today is-156 lbs  She has been referred to plastic surgery for excessive skin.  Nystatin was refilled for skin irritation.  Patient also has extra skin around her thighs which causes chafing.  She has an appointment scheduled with plastic surgery    HTN: Patient is on amlodipine for her blood pressure.  BP readings have been elevated recently.  Dose was increased last visit.    HLD: Lipid panel in 2/2024 showed LDL 80, HDL 53, triglycerides 36    Patient had elevated A1c to 6.4% in 10/2022 that has improved    Noted to have iron deficiency anemia with hemoglobin 10.  Patient reports menorrhagia.  She saw gynecology but was not satisfied and is looking for another opinion    Review of Systems   Constitutional:  Negative for activity change, appetite change, chills, diaphoresis, fatigue, fever and unexpected weight change.   HENT:  Negative for congestion, nosebleeds, postnasal drip and rhinorrhea.    Respiratory:  Negative for cough, chest tightness, shortness of breath and wheezing.    Cardiovascular:  Negative for chest pain and leg swelling.   Gastrointestinal:  Negative for abdominal pain, blood in stool, diarrhea, nausea and vomiting.   Endocrine: Negative for polydipsia and polyphagia.   Genitourinary:  Positive for menstrual problem and vaginal bleeding. Negative for decreased urine volume, dysuria, frequency and pelvic pain.   Musculoskeletal:  Negative for back pain, myalgias and neck pain.   Neurological:  Negative for dizziness, tremors,

## 2024-11-12 DIAGNOSIS — I10 HYPERTENSION, UNSPECIFIED TYPE: ICD-10-CM

## 2024-11-12 RX ORDER — AMLODIPINE BESYLATE 5 MG/1
5 TABLET ORAL DAILY
Qty: 30 TABLET | Refills: 1 | OUTPATIENT
Start: 2024-11-12

## 2025-01-12 ENCOUNTER — APPOINTMENT (OUTPATIENT)
Facility: HOSPITAL | Age: 40
DRG: 234 | End: 2025-01-12
Payer: MEDICAID

## 2025-01-12 ENCOUNTER — HOSPITAL ENCOUNTER (INPATIENT)
Facility: HOSPITAL | Age: 40
LOS: 1 days | Discharge: HOME | DRG: 234 | End: 2025-01-13
Attending: STUDENT IN AN ORGANIZED HEALTH CARE EDUCATION/TRAINING PROGRAM | Admitting: COLON & RECTAL SURGERY
Payer: MEDICAID

## 2025-01-12 ENCOUNTER — ANESTHESIA EVENT (OUTPATIENT)
Facility: HOSPITAL | Age: 40
End: 2025-01-12
Payer: MEDICAID

## 2025-01-12 ENCOUNTER — ANESTHESIA (OUTPATIENT)
Facility: HOSPITAL | Age: 40
End: 2025-01-12
Payer: MEDICAID

## 2025-01-12 DIAGNOSIS — K35.80 ACUTE APPENDICITIS, UNSPECIFIED ACUTE APPENDICITIS TYPE: Primary | ICD-10-CM

## 2025-01-12 PROBLEM — K37 APPENDICITIS: Status: ACTIVE | Noted: 2025-01-12

## 2025-01-12 LAB
ALBUMIN SERPL-MCNC: 3.8 G/DL (ref 3.4–5)
ALBUMIN/GLOB SERPL: 1 (ref 0.8–1.7)
ALP SERPL-CCNC: 84 U/L (ref 45–117)
ALT SERPL-CCNC: 21 U/L (ref 13–56)
ANION GAP SERPL CALC-SCNC: 5 MMOL/L (ref 3–18)
APPEARANCE UR: CLEAR
AST SERPL-CCNC: 18 U/L (ref 10–38)
BACTERIA URNS QL MICRO: NEGATIVE /HPF
BASOPHILS # BLD: 0.04 K/UL (ref 0–0.1)
BASOPHILS NFR BLD: 0.4 % (ref 0–2)
BILIRUB DIRECT SERPL-MCNC: 0.1 MG/DL (ref 0–0.2)
BILIRUB SERPL-MCNC: 0.5 MG/DL (ref 0.2–1)
BILIRUB UR QL: NEGATIVE
BUN SERPL-MCNC: 6 MG/DL (ref 7–18)
BUN/CREAT SERPL: 9 (ref 12–20)
CALCIUM SERPL-MCNC: 8.7 MG/DL (ref 8.5–10.1)
CHLORIDE SERPL-SCNC: 106 MMOL/L (ref 100–111)
CO2 SERPL-SCNC: 27 MMOL/L (ref 21–32)
COLOR UR: YELLOW
CREAT SERPL-MCNC: 0.7 MG/DL (ref 0.6–1.3)
D DIMER PPP FEU-MCNC: 0.68 UG/ML(FEU)
DIFFERENTIAL METHOD BLD: ABNORMAL
EKG ATRIAL RATE: 101 BPM
EKG DIAGNOSIS: NORMAL
EKG P AXIS: 80 DEGREES
EKG P-R INTERVAL: 146 MS
EKG Q-T INTERVAL: 334 MS
EKG QRS DURATION: 78 MS
EKG QTC CALCULATION (BAZETT): 433 MS
EKG R AXIS: 55 DEGREES
EKG T AXIS: 42 DEGREES
EKG VENTRICULAR RATE: 101 BPM
EOSINOPHIL # BLD: 0.01 K/UL (ref 0–0.4)
EOSINOPHIL NFR BLD: 0.1 % (ref 0–5)
EPITH CASTS URNS QL MICRO: NORMAL /LPF (ref 0–5)
ERYTHROCYTE [DISTWIDTH] IN BLOOD BY AUTOMATED COUNT: 15.9 % (ref 11.6–14.5)
GLOBULIN SER CALC-MCNC: 4 G/DL (ref 2–4)
GLUCOSE SERPL-MCNC: 110 MG/DL (ref 74–99)
GLUCOSE UR STRIP.AUTO-MCNC: NEGATIVE MG/DL
HCG SERPL QL: NEGATIVE
HCT VFR BLD AUTO: 29.8 % (ref 35–45)
HGB BLD-MCNC: 9.2 G/DL (ref 12–16)
HGB UR QL STRIP: NEGATIVE
IMM GRANULOCYTES # BLD AUTO: 0.03 K/UL (ref 0–0.04)
IMM GRANULOCYTES NFR BLD AUTO: 0.3 % (ref 0–0.5)
KETONES UR QL STRIP.AUTO: ABNORMAL MG/DL
LEUKOCYTE ESTERASE UR QL STRIP.AUTO: NEGATIVE
LIPASE SERPL-CCNC: 25 U/L (ref 13–75)
LYMPHOCYTES # BLD: 0.59 K/UL (ref 0.9–3.6)
LYMPHOCYTES NFR BLD: 6.1 % (ref 21–52)
MAGNESIUM SERPL-MCNC: 1.7 MG/DL (ref 1.6–2.6)
MCH RBC QN AUTO: 23.2 PG (ref 24–34)
MCHC RBC AUTO-ENTMCNC: 30.9 G/DL (ref 31–37)
MCV RBC AUTO: 75.3 FL (ref 78–100)
MONOCYTES # BLD: 0.85 K/UL (ref 0.05–1.2)
MONOCYTES NFR BLD: 8.8 % (ref 3–10)
NEUTS SEG # BLD: 8.16 K/UL (ref 1.8–8)
NEUTS SEG NFR BLD: 84.3 % (ref 40–73)
NITRITE UR QL STRIP.AUTO: NEGATIVE
NRBC # BLD: 0 K/UL (ref 0–0.01)
NRBC BLD-RTO: 0 PER 100 WBC
PH UR STRIP: 7.5 (ref 5–8)
PLATELET # BLD AUTO: 360 K/UL (ref 135–420)
PMV BLD AUTO: 8.9 FL (ref 9.2–11.8)
POTASSIUM SERPL-SCNC: 3.8 MMOL/L (ref 3.5–5.5)
PROT SERPL-MCNC: 7.8 G/DL (ref 6.4–8.2)
PROT UR STRIP-MCNC: ABNORMAL MG/DL
RBC # BLD AUTO: 3.96 M/UL (ref 4.2–5.3)
RBC #/AREA URNS HPF: NEGATIVE /HPF (ref 0–5)
SODIUM SERPL-SCNC: 138 MMOL/L (ref 136–145)
SP GR UR REFRACTOMETRY: 1.02 (ref 1–1.03)
TROPONIN I SERPL HS-MCNC: 3 NG/L (ref 0–54)
UROBILINOGEN UR QL STRIP.AUTO: 1 EU/DL (ref 0.2–1)
WBC # BLD AUTO: 9.7 K/UL (ref 4.6–13.2)
WBC URNS QL MICRO: NORMAL /HPF (ref 0–4)

## 2025-01-12 PROCEDURE — 85379 FIBRIN DEGRADATION QUANT: CPT

## 2025-01-12 PROCEDURE — G0378 HOSPITAL OBSERVATION PER HR: HCPCS

## 2025-01-12 PROCEDURE — 6360000004 HC RX CONTRAST MEDICATION: Performed by: STUDENT IN AN ORGANIZED HEALTH CARE EDUCATION/TRAINING PROGRAM

## 2025-01-12 PROCEDURE — 3700000000 HC ANESTHESIA ATTENDED CARE: Performed by: COLON & RECTAL SURGERY

## 2025-01-12 PROCEDURE — 99285 EMERGENCY DEPT VISIT HI MDM: CPT

## 2025-01-12 PROCEDURE — 93005 ELECTROCARDIOGRAM TRACING: CPT | Performed by: STUDENT IN AN ORGANIZED HEALTH CARE EDUCATION/TRAINING PROGRAM

## 2025-01-12 PROCEDURE — 99222 1ST HOSP IP/OBS MODERATE 55: CPT | Performed by: COLON & RECTAL SURGERY

## 2025-01-12 PROCEDURE — 96361 HYDRATE IV INFUSION ADD-ON: CPT

## 2025-01-12 PROCEDURE — 2500000003 HC RX 250 WO HCPCS: Performed by: COLON & RECTAL SURGERY

## 2025-01-12 PROCEDURE — 71045 X-RAY EXAM CHEST 1 VIEW: CPT

## 2025-01-12 PROCEDURE — 84703 CHORIONIC GONADOTROPIN ASSAY: CPT

## 2025-01-12 PROCEDURE — 2500000003 HC RX 250 WO HCPCS: Performed by: NURSE ANESTHETIST, CERTIFIED REGISTERED

## 2025-01-12 PROCEDURE — 3600000012 HC SURGERY LEVEL 2 ADDTL 15MIN: Performed by: COLON & RECTAL SURGERY

## 2025-01-12 PROCEDURE — 81001 URINALYSIS AUTO W/SCOPE: CPT

## 2025-01-12 PROCEDURE — 96374 THER/PROPH/DIAG INJ IV PUSH: CPT

## 2025-01-12 PROCEDURE — 3600000002 HC SURGERY LEVEL 2 BASE: Performed by: COLON & RECTAL SURGERY

## 2025-01-12 PROCEDURE — 80048 BASIC METABOLIC PNL TOTAL CA: CPT

## 2025-01-12 PROCEDURE — 88304 TISSUE EXAM BY PATHOLOGIST: CPT

## 2025-01-12 PROCEDURE — 6360000002 HC RX W HCPCS: Performed by: COLON & RECTAL SURGERY

## 2025-01-12 PROCEDURE — 44970 LAPAROSCOPY APPENDECTOMY: CPT | Performed by: COLON & RECTAL SURGERY

## 2025-01-12 PROCEDURE — 80076 HEPATIC FUNCTION PANEL: CPT

## 2025-01-12 PROCEDURE — 83735 ASSAY OF MAGNESIUM: CPT

## 2025-01-12 PROCEDURE — 2580000003 HC RX 258: Performed by: STUDENT IN AN ORGANIZED HEALTH CARE EDUCATION/TRAINING PROGRAM

## 2025-01-12 PROCEDURE — 0DTJ4ZZ RESECTION OF APPENDIX, PERCUTANEOUS ENDOSCOPIC APPROACH: ICD-10-PCS | Performed by: COLON & RECTAL SURGERY

## 2025-01-12 PROCEDURE — 3700000001 HC ADD 15 MINUTES (ANESTHESIA): Performed by: COLON & RECTAL SURGERY

## 2025-01-12 PROCEDURE — 83690 ASSAY OF LIPASE: CPT

## 2025-01-12 PROCEDURE — 93010 ELECTROCARDIOGRAM REPORT: CPT | Performed by: INTERNAL MEDICINE

## 2025-01-12 PROCEDURE — 6360000002 HC RX W HCPCS: Performed by: NURSE ANESTHETIST, CERTIFIED REGISTERED

## 2025-01-12 PROCEDURE — 84484 ASSAY OF TROPONIN QUANT: CPT

## 2025-01-12 PROCEDURE — 74177 CT ABD & PELVIS W/CONTRAST: CPT

## 2025-01-12 PROCEDURE — 7100000001 HC PACU RECOVERY - ADDTL 15 MIN: Performed by: COLON & RECTAL SURGERY

## 2025-01-12 PROCEDURE — 2720000010 HC SURG SUPPLY STERILE: Performed by: COLON & RECTAL SURGERY

## 2025-01-12 PROCEDURE — 96375 TX/PRO/DX INJ NEW DRUG ADDON: CPT

## 2025-01-12 PROCEDURE — 6360000002 HC RX W HCPCS: Performed by: STUDENT IN AN ORGANIZED HEALTH CARE EDUCATION/TRAINING PROGRAM

## 2025-01-12 PROCEDURE — 71275 CT ANGIOGRAPHY CHEST: CPT

## 2025-01-12 PROCEDURE — 85025 COMPLETE CBC W/AUTO DIFF WBC: CPT

## 2025-01-12 PROCEDURE — 2709999900 HC NON-CHARGEABLE SUPPLY: Performed by: COLON & RECTAL SURGERY

## 2025-01-12 PROCEDURE — 2580000003 HC RX 258: Performed by: COLON & RECTAL SURGERY

## 2025-01-12 PROCEDURE — 7100000000 HC PACU RECOVERY - FIRST 15 MIN: Performed by: COLON & RECTAL SURGERY

## 2025-01-12 PROCEDURE — 1100000000 HC RM PRIVATE

## 2025-01-12 RX ORDER — FENTANYL CITRATE 50 UG/ML
25 INJECTION, SOLUTION INTRAMUSCULAR; INTRAVENOUS EVERY 5 MIN PRN
Status: DISCONTINUED | OUTPATIENT
Start: 2025-01-12 | End: 2025-01-12 | Stop reason: HOSPADM

## 2025-01-12 RX ORDER — LIDOCAINE HYDROCHLORIDE 20 MG/ML
INJECTION, SOLUTION EPIDURAL; INFILTRATION; INTRACAUDAL; PERINEURAL
Status: DISCONTINUED | OUTPATIENT
Start: 2025-01-12 | End: 2025-01-12 | Stop reason: SDUPTHER

## 2025-01-12 RX ORDER — ONDANSETRON 2 MG/ML
INJECTION INTRAMUSCULAR; INTRAVENOUS
Status: DISCONTINUED | OUTPATIENT
Start: 2025-01-12 | End: 2025-01-12 | Stop reason: SDUPTHER

## 2025-01-12 RX ORDER — SODIUM CHLORIDE 0.9 % (FLUSH) 0.9 %
5-40 SYRINGE (ML) INJECTION EVERY 12 HOURS SCHEDULED
Status: DISCONTINUED | OUTPATIENT
Start: 2025-01-12 | End: 2025-01-12 | Stop reason: HOSPADM

## 2025-01-12 RX ORDER — MORPHINE SULFATE 4 MG/ML
4 INJECTION, SOLUTION INTRAMUSCULAR; INTRAVENOUS
Status: COMPLETED | OUTPATIENT
Start: 2025-01-12 | End: 2025-01-12

## 2025-01-12 RX ORDER — METRONIDAZOLE 500 MG/100ML
500 INJECTION, SOLUTION INTRAVENOUS ONCE
Status: COMPLETED | OUTPATIENT
Start: 2025-01-12 | End: 2025-01-12

## 2025-01-12 RX ORDER — FENTANYL CITRATE 50 UG/ML
50 INJECTION, SOLUTION INTRAMUSCULAR; INTRAVENOUS EVERY 5 MIN PRN
Status: DISCONTINUED | OUTPATIENT
Start: 2025-01-12 | End: 2025-01-12 | Stop reason: HOSPADM

## 2025-01-12 RX ORDER — SODIUM CHLORIDE, SODIUM LACTATE, POTASSIUM CHLORIDE, CALCIUM CHLORIDE 600; 310; 30; 20 MG/100ML; MG/100ML; MG/100ML; MG/100ML
INJECTION, SOLUTION INTRAVENOUS CONTINUOUS
Status: DISCONTINUED | OUTPATIENT
Start: 2025-01-12 | End: 2025-01-13 | Stop reason: HOSPADM

## 2025-01-12 RX ORDER — IOPAMIDOL 755 MG/ML
100 INJECTION, SOLUTION INTRAVASCULAR
Status: COMPLETED | OUTPATIENT
Start: 2025-01-12 | End: 2025-01-12

## 2025-01-12 RX ORDER — MORPHINE SULFATE 2 MG/ML
2 INJECTION, SOLUTION INTRAMUSCULAR; INTRAVENOUS
Status: DISCONTINUED | OUTPATIENT
Start: 2025-01-12 | End: 2025-01-13 | Stop reason: HOSPADM

## 2025-01-12 RX ORDER — 0.9 % SODIUM CHLORIDE 0.9 %
1000 INTRAVENOUS SOLUTION INTRAVENOUS ONCE
Status: COMPLETED | OUTPATIENT
Start: 2025-01-12 | End: 2025-01-12

## 2025-01-12 RX ORDER — AMLODIPINE BESYLATE 10 MG/1
10 TABLET ORAL DAILY
Status: DISCONTINUED | OUTPATIENT
Start: 2025-01-13 | End: 2025-01-13 | Stop reason: HOSPADM

## 2025-01-12 RX ORDER — KETOROLAC TROMETHAMINE 15 MG/ML
INJECTION, SOLUTION INTRAMUSCULAR; INTRAVENOUS
Status: DISCONTINUED | OUTPATIENT
Start: 2025-01-12 | End: 2025-01-12 | Stop reason: SDUPTHER

## 2025-01-12 RX ORDER — MORPHINE SULFATE 4 MG/ML
4 INJECTION, SOLUTION INTRAMUSCULAR; INTRAVENOUS
Status: DISCONTINUED | OUTPATIENT
Start: 2025-01-12 | End: 2025-01-13 | Stop reason: HOSPADM

## 2025-01-12 RX ORDER — ONDANSETRON 2 MG/ML
4 INJECTION INTRAMUSCULAR; INTRAVENOUS
Status: COMPLETED | OUTPATIENT
Start: 2025-01-12 | End: 2025-01-12

## 2025-01-12 RX ORDER — DEXAMETHASONE SODIUM PHOSPHATE 4 MG/ML
INJECTION, SOLUTION INTRA-ARTICULAR; INTRALESIONAL; INTRAMUSCULAR; INTRAVENOUS; SOFT TISSUE
Status: DISCONTINUED | OUTPATIENT
Start: 2025-01-12 | End: 2025-01-12 | Stop reason: SDUPTHER

## 2025-01-12 RX ORDER — PROPOFOL 10 MG/ML
INJECTION, EMULSION INTRAVENOUS
Status: DISCONTINUED | OUTPATIENT
Start: 2025-01-12 | End: 2025-01-12 | Stop reason: SDUPTHER

## 2025-01-12 RX ORDER — ONDANSETRON 2 MG/ML
4 INJECTION INTRAMUSCULAR; INTRAVENOUS
Status: DISCONTINUED | OUTPATIENT
Start: 2025-01-12 | End: 2025-01-12 | Stop reason: HOSPADM

## 2025-01-12 RX ORDER — NALOXONE HYDROCHLORIDE 0.4 MG/ML
INJECTION, SOLUTION INTRAMUSCULAR; INTRAVENOUS; SUBCUTANEOUS PRN
Status: DISCONTINUED | OUTPATIENT
Start: 2025-01-12 | End: 2025-01-12 | Stop reason: HOSPADM

## 2025-01-12 RX ORDER — SUCCINYLCHOLINE/SOD CL,ISO/PF 100 MG/5ML
SYRINGE (ML) INTRAVENOUS
Status: DISCONTINUED | OUTPATIENT
Start: 2025-01-12 | End: 2025-01-12 | Stop reason: SDUPTHER

## 2025-01-12 RX ORDER — SODIUM CHLORIDE 9 MG/ML
INJECTION, SOLUTION INTRAVENOUS PRN
Status: DISCONTINUED | OUTPATIENT
Start: 2025-01-12 | End: 2025-01-12 | Stop reason: HOSPADM

## 2025-01-12 RX ORDER — ACETAMINOPHEN 325 MG/1
650 TABLET ORAL EVERY 4 HOURS PRN
Status: DISCONTINUED | OUTPATIENT
Start: 2025-01-12 | End: 2025-01-13 | Stop reason: HOSPADM

## 2025-01-12 RX ORDER — ROCURONIUM BROMIDE 10 MG/ML
INJECTION, SOLUTION INTRAVENOUS
Status: DISCONTINUED | OUTPATIENT
Start: 2025-01-12 | End: 2025-01-12 | Stop reason: SDUPTHER

## 2025-01-12 RX ORDER — DIPHENHYDRAMINE HYDROCHLORIDE 50 MG/ML
25 INJECTION INTRAMUSCULAR; INTRAVENOUS EVERY 6 HOURS PRN
Status: DISCONTINUED | OUTPATIENT
Start: 2025-01-12 | End: 2025-01-13 | Stop reason: HOSPADM

## 2025-01-12 RX ORDER — MAGNESIUM SULFATE HEPTAHYDRATE 500 MG/ML
INJECTION, SOLUTION INTRAMUSCULAR; INTRAVENOUS
Status: DISCONTINUED | OUTPATIENT
Start: 2025-01-12 | End: 2025-01-12 | Stop reason: SDUPTHER

## 2025-01-12 RX ORDER — ONDANSETRON 2 MG/ML
4 INJECTION INTRAMUSCULAR; INTRAVENOUS EVERY 6 HOURS PRN
Status: DISCONTINUED | OUTPATIENT
Start: 2025-01-12 | End: 2025-01-13 | Stop reason: HOSPADM

## 2025-01-12 RX ORDER — MAGNESIUM HYDROXIDE 1200 MG/15ML
LIQUID ORAL CONTINUOUS PRN
Status: COMPLETED | OUTPATIENT
Start: 2025-01-12 | End: 2025-01-12

## 2025-01-12 RX ORDER — OXYCODONE AND ACETAMINOPHEN 5; 325 MG/1; MG/1
1 TABLET ORAL EVERY 4 HOURS PRN
Qty: 30 TABLET | Refills: 0 | Status: SHIPPED | OUTPATIENT
Start: 2025-01-12 | End: 2025-01-19

## 2025-01-12 RX ORDER — SODIUM CHLORIDE 0.9 % (FLUSH) 0.9 %
5-40 SYRINGE (ML) INJECTION PRN
Status: DISCONTINUED | OUTPATIENT
Start: 2025-01-12 | End: 2025-01-12 | Stop reason: HOSPADM

## 2025-01-12 RX ORDER — FENTANYL CITRATE 50 UG/ML
INJECTION, SOLUTION INTRAMUSCULAR; INTRAVENOUS
Status: DISCONTINUED | OUTPATIENT
Start: 2025-01-12 | End: 2025-01-12 | Stop reason: SDUPTHER

## 2025-01-12 RX ADMIN — METRONIDAZOLE 500 MG: 500 INJECTION, SOLUTION INTRAVENOUS at 12:25

## 2025-01-12 RX ADMIN — MORPHINE SULFATE 2 MG: 2 INJECTION, SOLUTION INTRAMUSCULAR; INTRAVENOUS at 19:10

## 2025-01-12 RX ADMIN — DEXAMETHASONE SODIUM PHOSPHATE 4 MG: 4 INJECTION INTRA-ARTICULAR; INTRALESIONAL; INTRAMUSCULAR; INTRAVENOUS; SOFT TISSUE at 12:19

## 2025-01-12 RX ADMIN — ONDANSETRON 4 MG: 2 INJECTION INTRAMUSCULAR; INTRAVENOUS at 12:19

## 2025-01-12 RX ADMIN — SODIUM CHLORIDE 1000 ML: 9 INJECTION, SOLUTION INTRAVENOUS at 09:27

## 2025-01-12 RX ADMIN — MAGNESIUM SULFATE HEPTAHYDRATE 2 G: 500 INJECTION, SOLUTION INTRAMUSCULAR; INTRAVENOUS at 12:19

## 2025-01-12 RX ADMIN — PROPOFOL 150 MG: 10 INJECTION, EMULSION INTRAVENOUS at 12:19

## 2025-01-12 RX ADMIN — LIDOCAINE HYDROCHLORIDE 100 MG: 20 INJECTION, SOLUTION EPIDURAL; INFILTRATION; INTRACAUDAL; PERINEURAL at 12:19

## 2025-01-12 RX ADMIN — KETOROLAC TROMETHAMINE 15 MG: 15 INJECTION, SOLUTION INTRAMUSCULAR; INTRAVENOUS at 12:51

## 2025-01-12 RX ADMIN — IOPAMIDOL 100 ML: 755 INJECTION, SOLUTION INTRAVENOUS at 09:11

## 2025-01-12 RX ADMIN — FENTANYL CITRATE 50 MCG: 50 INJECTION INTRAMUSCULAR; INTRAVENOUS at 12:54

## 2025-01-12 RX ADMIN — WATER 2000 MG: 1 INJECTION INTRAMUSCULAR; INTRAVENOUS; SUBCUTANEOUS at 12:24

## 2025-01-12 RX ADMIN — Medication 100 MG: at 12:19

## 2025-01-12 RX ADMIN — ROCURONIUM BROMIDE 40 MG: 50 INJECTION INTRAVENOUS at 12:25

## 2025-01-12 RX ADMIN — SODIUM CHLORIDE, SODIUM LACTATE, POTASSIUM CHLORIDE, AND CALCIUM CHLORIDE: 600; 310; 30; 20 INJECTION, SOLUTION INTRAVENOUS at 12:05

## 2025-01-12 RX ADMIN — FENTANYL CITRATE 50 MCG: 50 INJECTION INTRAMUSCULAR; INTRAVENOUS at 12:29

## 2025-01-12 RX ADMIN — MORPHINE SULFATE 4 MG: 4 INJECTION, SOLUTION INTRAMUSCULAR; INTRAVENOUS at 08:37

## 2025-01-12 RX ADMIN — SUGAMMADEX 200 MG: 100 INJECTION, SOLUTION INTRAVENOUS at 12:54

## 2025-01-12 RX ADMIN — ONDANSETRON 4 MG: 2 INJECTION INTRAMUSCULAR; INTRAVENOUS at 08:37

## 2025-01-12 ASSESSMENT — PAIN SCALES - GENERAL
PAINLEVEL_OUTOF10: 0
PAINLEVEL_OUTOF10: 6
PAINLEVEL_OUTOF10: 4
PAINLEVEL_OUTOF10: 0
PAINLEVEL_OUTOF10: 10

## 2025-01-12 ASSESSMENT — PAIN - FUNCTIONAL ASSESSMENT
PAIN_FUNCTIONAL_ASSESSMENT: ACTIVITIES ARE NOT PREVENTED
PAIN_FUNCTIONAL_ASSESSMENT: 0-10
PAIN_FUNCTIONAL_ASSESSMENT: 0-10

## 2025-01-12 ASSESSMENT — PAIN DESCRIPTION - DESCRIPTORS
DESCRIPTORS: ACHING;DISCOMFORT;THROBBING
DESCRIPTORS: DISCOMFORT;DULL

## 2025-01-12 ASSESSMENT — PAIN DESCRIPTION - LOCATION
LOCATION: ABDOMEN
LOCATION: ABDOMEN
LOCATION: CHEST;ABDOMEN
LOCATION: ABDOMEN

## 2025-01-12 ASSESSMENT — LIFESTYLE VARIABLES
HOW OFTEN DO YOU HAVE A DRINK CONTAINING ALCOHOL: NEVER
HOW MANY STANDARD DRINKS CONTAINING ALCOHOL DO YOU HAVE ON A TYPICAL DAY: PATIENT DOES NOT DRINK

## 2025-01-12 ASSESSMENT — PAIN DESCRIPTION - FREQUENCY: FREQUENCY: INTERMITTENT

## 2025-01-12 ASSESSMENT — PAIN DESCRIPTION - PAIN TYPE: TYPE: ACUTE PAIN

## 2025-01-12 ASSESSMENT — PAIN DESCRIPTION - ONSET: ONSET: GRADUAL

## 2025-01-12 ASSESSMENT — PAIN DESCRIPTION - ORIENTATION: ORIENTATION: ANTERIOR

## 2025-01-12 NOTE — PERIOP NOTE
TRANSFER - OUT REPORT:    Verbal report given to EDUARDO Jiménez on Jennifer Tirado  being transferred to [address] [place] [address], Room 502 for routine progression of patient care       Report consisted of patient's Situation, Background, Assessment and   Recommendations(SBAR).     Information from the following report(s) Nurse Handoff Report, Index, Adult Overview, Surgery Report, Quality Measures, and Neuro Assessment was reviewed with the receiving nurse.           Lines:   Peripheral IV 01/12/25 Left Antecubital (Active)   Site Assessment Clean, dry & intact 01/12/25 1311   Line Status Infusing;Flushed 01/12/25 1311   Line Care Connections checked and tightened 01/12/25 1311   Phlebitis Assessment No symptoms 01/12/25 1311   Infiltration Assessment 0 01/12/25 1311   Alcohol Cap Used No 01/12/25 1311   Dressing Status Clean, dry & intact 01/12/25 1311   Dressing Type Transparent 01/12/25 1311   Dressing Intervention New 01/12/25 0803        Opportunity for questions and clarification was provided.      Patient transported with:  Tech

## 2025-01-12 NOTE — ED PROVIDER NOTES
EMERGENCY DEPARTMENT HISTORY AND PHYSICAL EXAM      Date: 2025  Patient Name: Jennifer Tirado    History of Presenting Illness     Chief Complaint   Patient presents with    Tachycardia    Abdominal Pain       39-year-old female with history of hypertension, hyperlipidemia, previous gastric bypass and AAA patient presenting to the emergency department for evaluation of generalized abdominal pain.  Patient states that it started last night but she had pain in her approximately a week ago but resolved on its own.  She reports some nausea without any vomiting.  No diarrhea.  Reports that the pain is causing her to have some palpitations.           PCP: Olinda Wolf MD    Current Facility-Administered Medications   Medication Dose Route Frequency Provider Last Rate Last Admin    cefOXitin (MEFOXIN) 1,000 mg in sterile water 10 mL IV syringe  1,000 mg IntraVENous Once Yovani James,          Current Outpatient Medications   Medication Sig Dispense Refill    amLODIPine (NORVASC) 10 MG tablet Take 1 tablet by mouth daily 90 tablet 1    nystatin (MYCOSTATIN) 818883 UNIT/GM powder Apply to affected area four times daily as needed for itching 30 g 2    vitamin D (CHOLECALCIFEROL) 74291 UNIT CAPS Take 1 capsule by mouth every 7 days 10 capsule 1    Multiple Vitamins-Minerals (BARIATRIC MULTIVITAMINS/IRON) CAPS Take 1 capsule daily 90 capsule 3    ferrous sulfate (IRON 325) 325 (65 Fe) MG tablet Take 1 tablet by mouth daily (with breakfast) 90 tablet 1    Calcium Citrate-Vitamin D 500-10 MG-MCG CHEW Take 500 mg by mouth in the morning, at noon, and at bedtime         Past History     Past Medical History:  Past Medical History:   Diagnosis Date    Hyperlipidemia     Hypertension        Past Surgical History:  Past Surgical History:   Procedure Laterality Date     SECTION      X 4    ENDOMETRIAL ABLATION      GASTRIC BYPASS SURGERY  2022    TUBAL LIGATION         Family History:  History reviewed. No  Esterase, Urine Negative NEG     Urinalysis, Micro    Collection Time: 01/12/25  8:45 AM   Result Value Ref Range    WBC, UA 0-3 0 - 4 /hpf    RBC, UA Negative 0 - 5 /hpf    Epithelial Cells, UA FEW 0 - 5 /lpf    BACTERIA, URINE Negative NEG /hpf       Radiologic Studies -   Non-plain film images such as CT, Ultrasound and MRI are read by the radiologist. Plain radiographic images are visualized and preliminarily interpreted by the emergency physician.    CTA CHEST W WO CONTRAST   Final Result   No evidence of pulmonary embolism.      Partially visualized changes of gastric bypass in the upper abdomen.         Electronically signed by Leo      CT ABDOMEN PELVIS W IV CONTRAST Additional Contrast? None   Final Result      The appendix is enlarged with periappendiceal inflammation and hyperenhancing   wall concerning for an acute, uncomplicated appendicitis.      Postsurgical changes of gastric bypass, without evidence of complication.            Electronically signed by Leo      XR CHEST PORTABLE   Final Result      No acute findings in the chest.         Electronically signed by Leo              Medical Decision Making   I am the first provider for this patient.    I reviewed the vital signs, available nursing notes, past medical history, past surgical history, family history and social history.      Vital Signs-Reviewed the patient's vital signs.    EKG: All EKG's are interpreted by the Emergency Department Physician who either signs or Co-signs this chart in the absence of a cardiologist.  Sinus tachycardia.  No STEMI.  As interpreted by me.             Interpretation per the Radiologist below, if available at the time of this note:    ED Course: Progress Notes, Reevaluation, and Consults:    Provider Notes (Medical Decision Making):       MDM  Number of Diagnoses or Management Options  Acute appendicitis, unspecified acute appendicitis type  Diagnosis management comments: Patient is

## 2025-01-12 NOTE — H&P
HPI: Jennifer Tirado is a 39 y.o. female presenting with chief complain of abdominal pain. Started yesterday. RLQ. Denies N/V.     Past Medical History:   Diagnosis Date    Hyperlipidemia     Hypertension        Past Surgical History:   Procedure Laterality Date     SECTION      X 4    ENDOMETRIAL ABLATION      GASTRIC BYPASS SURGERY  2022    TUBAL LIGATION         History reviewed. No pertinent family history.    Social History     Socioeconomic History    Marital status:      Spouse name: None    Number of children: None    Years of education: None    Highest education level: None   Tobacco Use    Smoking status: Never    Smokeless tobacco: Never   Vaping Use    Vaping status: Never Used   Substance and Sexual Activity    Alcohol use: Never    Drug use: Never    Sexual activity: Yes     Social Determinants of Health     Financial Resource Strain: Low Risk  (2024)    Overall Financial Resource Strain (CARDIA)     Difficulty of Paying Living Expenses: Not hard at all   Food Insecurity: No Food Insecurity (2024)    Hunger Vital Sign     Worried About Running Out of Food in the Last Year: Never true     Ran Out of Food in the Last Year: Never true   Transportation Needs: Unknown (2024)    PRAPARE - Transportation     Lack of Transportation (Non-Medical): No   Housing Stability: Unknown (2024)    Housing Stability Vital Sign     Homeless in the Last Year: No       Current Outpatient Medications   Medication Instructions    amLODIPine (NORVASC) 10 mg, Oral, DAILY    Calcium Citrate-Vitamin D 500-10 MG-MCG CHEW 500 mg, Oral, 3 times daily    ferrous sulfate (IRON 325) 325 mg, Oral, DAILY WITH BREAKFAST    Multiple Vitamins-Minerals (BARIATRIC MULTIVITAMINS/IRON) CAPS Take 1 capsule daily    nystatin (MYCOSTATIN) 427228 UNIT/GM powder Apply to affected area four times daily as needed for itching    vitamin D (CHOLECALCIFEROL) 50,000 Units, Oral, EVERY 7 DAYS        No Known

## 2025-01-12 NOTE — PERIOP NOTE
TRANSFER - IN REPORT:    Verbal report received from Jessy ALONSO RN on Jenniferabdiel Tirado  being received from  for ordered procedure      Report consisted of patient's Situation, Background, Assessment and   Recommendations(SBAR).     Information from the following report(s) Nurse Handoff Report was reviewed with the receiving nurse.    Opportunity for questions and clarification was provided.

## 2025-01-12 NOTE — PERIOP NOTE
Patient /Family /Designee has been informed that [facility] is not responsible for patient belongings per policy and the signed [facility] Patient Agreement document.  Personal items should be sent home or checked in with security.  Patient /Family /Designee selected the following action:                            [x]  Send personal items home with a family member or friend                                                 []  Check in personal items with security, excluding clothing                            []  Maintain personal items at the bedside, against recommendation                                 by Sincere Isaacs [facility]

## 2025-01-12 NOTE — PERIOP NOTE
TRANSFER - IN REPORT:    Verbal report received from EDUARDO Khan  being received from [facility] for routine post-op      Report consisted of patient's Situation, Background, Assessment and   Recommendations(SBAR).     Information from the following report(s) Nurse Handoff Report was reviewed with the receiving nurse.    Opportunity for questions and clarification was provided.

## 2025-01-12 NOTE — ED TRIAGE NOTES
Patient presents to ER ambulatory with c/o feeling like her heart is racing and abd pain that started yesterday evening.

## 2025-01-12 NOTE — DISCHARGE INSTRUCTIONS
Okay to shower 48 hours after surgery  No heavy lifting for 4 weeks  Follow-up in the office in 2 weeks  Percocet for pain  Usual diet

## 2025-01-12 NOTE — FLOWSHEET NOTE
01/12/25 0830   Vital Signs   Pulse 97   Respirations 15   BP (!) 139/92   MAP (Calculated) 108   MAP (mmHg) 104   Oxygen Therapy   SpO2 100 %   Pulse via Oximetry 96 beats per minute     Vitals taken before morphine administration.   
No

## 2025-01-12 NOTE — OP NOTE
Operative Note      Patient: Jennifer Tirado  YOB: 1985  MRN: [id number]    Date of Procedure: 1/12/2025    Pre-Op Diagnosis Codes:      * Acute appendicitis, unspecified acute appendicitis type [K35.80]    Post-Op Diagnosis: Same       Procedure(s):  Laparoscopic appendectomy    Surgeon(s):  Dominick Verma MD    Assistant:   Surgical Assistant: Jenise Flor    Anesthesia: General    Estimated Blood Loss (mL): Minimal    Complications: None    Specimens:   ID Type Source Tests Collected by Time Destination   A : Appendix Tissue Appendix SURGICAL PATHOLOGY Dominick Verma MD 1/12/2025 1259        Implants:  * No implants in log *      Drains: * No LDAs found *    Findings:  Infection Present At Time Of Surgery (PATOS) (choose all levels that have infection present):  - Organ Space infection (below fascia) present as evidenced by phlegmon  Other Findings: Acute appendicitis    Detailed Description of Procedure:     The patient was probably notified in the PACU and brought to the operating room.  She was laid supine the operating table.  General anesthesia was administered.  Aquino catheter was placed and left arm was tucked at her side.  Abdomen was prepped and draped in usual sterile fashion.  1 cm vision was created above the umbilicus.  Veress needle inserted and abdomen insufflated to 15 mmHg.  12 mm laparoscopic port placed using Visiport technique.  5 mm ports placed in the lower midline and left lower quadrant.  The appendix was inflamed and edematous.  It is attached to the lateral retroperitoneum to the right of the right colon.  It was dissected free with LigaSure device mesoappendix was transected with LigaSure.  Appendix was stapled across at its base with a single firing of a 45 mm blue load Endo AYESHA stapler.  This was placed in an Endo Catch bag.  Right upper and lower quadrant and pelvis were irrigated thoroughly.  Ports were removed under direct visualization.  The appendix was

## 2025-01-13 VITALS
SYSTOLIC BLOOD PRESSURE: 133 MMHG | TEMPERATURE: 98.1 F | WEIGHT: 150 LBS | HEIGHT: 60 IN | OXYGEN SATURATION: 100 % | BODY MASS INDEX: 29.45 KG/M2 | RESPIRATION RATE: 18 BRPM | DIASTOLIC BLOOD PRESSURE: 82 MMHG | HEART RATE: 76 BPM

## 2025-01-13 PROCEDURE — 96376 TX/PRO/DX INJ SAME DRUG ADON: CPT

## 2025-01-13 PROCEDURE — 6360000002 HC RX W HCPCS: Performed by: COLON & RECTAL SURGERY

## 2025-01-13 PROCEDURE — 6370000000 HC RX 637 (ALT 250 FOR IP): Performed by: COLON & RECTAL SURGERY

## 2025-01-13 PROCEDURE — 2580000003 HC RX 258: Performed by: COLON & RECTAL SURGERY

## 2025-01-13 PROCEDURE — G0378 HOSPITAL OBSERVATION PER HR: HCPCS

## 2025-01-13 PROCEDURE — 96361 HYDRATE IV INFUSION ADD-ON: CPT

## 2025-01-13 RX ADMIN — MORPHINE SULFATE 2 MG: 2 INJECTION, SOLUTION INTRAMUSCULAR; INTRAVENOUS at 02:58

## 2025-01-13 RX ADMIN — AMLODIPINE BESYLATE 10 MG: 10 TABLET ORAL at 08:10

## 2025-01-13 RX ADMIN — SODIUM CHLORIDE, SODIUM LACTATE, POTASSIUM CHLORIDE, AND CALCIUM CHLORIDE: 600; 310; 30; 20 INJECTION, SOLUTION INTRAVENOUS at 03:04

## 2025-01-13 ASSESSMENT — PAIN SCALES - GENERAL
PAINLEVEL_OUTOF10: 6
PAINLEVEL_OUTOF10: 0

## 2025-01-13 ASSESSMENT — PAIN DESCRIPTION - DESCRIPTORS
DESCRIPTORS: ACHING
DESCRIPTORS: ACHING

## 2025-01-13 ASSESSMENT — PAIN DESCRIPTION - LOCATION
LOCATION: ABDOMEN
LOCATION: ABDOMEN

## 2025-01-13 ASSESSMENT — PAIN DESCRIPTION - ORIENTATION
ORIENTATION: ANTERIOR;MID
ORIENTATION: MID;ANTERIOR

## 2025-01-13 ASSESSMENT — PAIN - FUNCTIONAL ASSESSMENT
PAIN_FUNCTIONAL_ASSESSMENT: ACTIVITIES ARE NOT PREVENTED
PAIN_FUNCTIONAL_ASSESSMENT: ACTIVITIES ARE NOT PREVENTED

## 2025-01-13 NOTE — DISCHARGE SUMMARY
Colon and Rectal Surgery Discharge Summary     Patient: Jennifer Tirado MRN: [id number]  SSN: [id number]    YOB: 1985  Age: 39 y.o.  Sex: female       Admit Date: 1/12/2025    Discharge Date: 1/13/2025      Admission Diagnoses: Appendicitis [K37]  Acute appendicitis, unspecified acute appendicitis type [K35.80]    Discharge Diagnoses: Same    Procedure:  Laparoscopic appendectomy    Discharge Condition: Good    Hospital Course: 2 OR for above.  Diet advanced.  Observed overnight.  Remained afebrile.    Consults: None    Significant Diagnostic Studies: None    Disposition: home    Discharge Medications:   Current Discharge Medication List        START taking these medications    Details   oxyCODONE-acetaminophen (PERCOCET) 5-325 MG per tablet Take 1 tablet by mouth every 4 hours as needed for Pain for up to 7 days. Max Daily Amount: 6 tablets  Qty: 30 tablet, Refills: 0    Comments: Reduce doses taken as pain becomes manageable Reduce doses taken as pain becomes manageable  Associated Diagnoses: Acute appendicitis, unspecified acute appendicitis type           CONTINUE these medications which have NOT CHANGED    Details   amLODIPine (NORVASC) 10 MG tablet Take 1 tablet by mouth daily  Qty: 90 tablet, Refills: 1    Associated Diagnoses: Primary hypertension      nystatin (MYCOSTATIN) 259633 UNIT/GM powder Apply to affected area four times daily as needed for itching  Qty: 30 g, Refills: 2    Associated Diagnoses: S/P gastric bypass; Class 1 obesity with serious comorbidity and body mass index (BMI) of 31.0 to 31.9 in adult, unspecified obesity type; Excessive and redundant skin and subcutaneous tissue      vitamin D (CHOLECALCIFEROL) 09834 UNIT CAPS Take 1 capsule by mouth every 7 days  Qty: 10 capsule, Refills: 1    Associated Diagnoses: Vitamin D deficiency      Multiple Vitamins-Minerals (BARIATRIC MULTIVITAMINS/IRON) CAPS Take 1 capsule daily  Qty: 90 capsule, Refills: 3    Associated Diagnoses:

## 2025-01-13 NOTE — CARE COORDINATION
CM spoke with patient and her  at the bedside.   Facesheet and demographics verified.   No CM needs at this time.  Patient's  will be transporting patient home today for discharge.             Marianne Price RN  Case Management [phone]

## 2025-01-13 NOTE — PLAN OF CARE
Problem: Pain  Goal: Verbalizes/displays adequate comfort level or baseline comfort level  1/13/2025 0842 by Katelynn Condon RN  Outcome: Progressing  Flowsheets (Taken 1/13/2025 0842)  Verbalizes/displays adequate comfort level or baseline comfort level:   Encourage patient to monitor pain and request assistance   Assess pain using appropriate pain scale   Implement non-pharmacological measures as appropriate and evaluate response   Administer analgesics based on type and severity of pain and evaluate response  Note: Maintain a well controlled pain level with ordered PRN meds  1/12/2025 2339 by Shahbaz Villegas GN  Outcome: Progressing  Flowsheets (Taken 1/12/2025 2339)  Verbalizes/displays adequate comfort level or baseline comfort level: Encourage patient to monitor pain and request assistance  Note: Pt pain is monitored and treated per orders.      Problem: Discharge Planning  Goal: Discharge to home or other facility with appropriate resources  1/13/2025 0842 by Katelynn Condon RN  Outcome: Progressing  Flowsheets (Taken 1/12/2025 2339 by Shahbaz Villegas GN)  Discharge to home or other facility with appropriate resources: Identify barriers to discharge with patient and caregiver  Note: Will discharge home today  1/12/2025 2339 by Shahbaz Villegas GN  Outcome: Progressing  Flowsheets (Taken 1/12/2025 2339)  Discharge to home or other facility with appropriate resources: Identify barriers to discharge with patient and caregiver  Note: Case management when needed.      Problem: Safety - Adult  Goal: Free from fall injury  1/13/2025 0842 by Katelynn Condon RN  Outcome: Progressing  Flowsheets (Taken 1/12/2025 2010 by Shahbaz Villegas GN)  Free From Fall Injury:   Instruct family/caregiver on patient safety   Based on caregiver fall risk screen, instruct family/caregiver to ask for assistance with transferring infant if caregiver noted to have fall risk factors  Note: Family at bedside,  socks on, bed in

## 2025-01-13 NOTE — CARE COORDINATION
01/13/25 0919   Service Assessment   Patient Orientation Alert and Oriented;Person;Place;Situation;Self   Cognition Alert   History Provided By Patient;Spouse   Primary Caregiver Self   Accompanied By/Relationship Patient's  is at the bedside with the patient.   Support Systems Spouse/Significant Other;Parent   Patient's Healthcare Decision Maker is: Named in Scanned ACP Document   PCP Verified by CM Yes   Last Visit to PCP Within last 3 months   Prior Functional Level Independent in ADLs/IADLs   Current Functional Level Independent in ADLs/IADLs   Can patient return to prior living arrangement Yes   Ability to make needs known: Good   Family able to assist with home care needs: Yes   Would you like for me to discuss the discharge plan with any other family members/significant others, and if so, who? Yes  (Patient's  and patient's mother, both listed in patient's contact list.)   Financial Resources Medicaid   Community Resources None   CM/KISHAN Referral Other (see comment)  (Discharge planning.)   Social/Functional History   Lives With Spouse   Type of Home House   Home Layout One level   Home Access Stairs to enter with rails   Entrance Stairs - Number of Steps 2 Steps to enter the home.   Entrance Stairs - Rails Both   Bathroom Shower/Tub Tub/Shower unit   Bathroom Toilet Standard   Bathroom Equipment Grab bars in shower;Grab bars around toilet   Bathroom Accessibility Accessible   Home Equipment None   Receives Help From Family   Prior Level of Assist for ADLs Independent   Prior Level of Assist for Homemaking Independent   Homemaking Responsibilities Yes   Ambulation Assistance Independent   Prior Level of Assist for Transfers Independent   Active  Yes   Mode of Transportation SUV   Education Not applicable.   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   Discharge Planning   Type of Residence House   Living Arrangements Spouse/Significant Other

## 2025-01-13 NOTE — PROGRESS NOTES
Pt adequate for discharge. Pt in stable condition for discharge and released by provider per order. Discharge instructions reviewed with pt and was able to verbalize understanding of instructions given. IV access removed. Work note given.    No questions or concerns at this time.

## 2025-01-13 NOTE — CARE COORDINATION
D/C order noted for today. Orders reviewed. No needs identified at this time. Patient's  will be transporting patient home today for discharge. CM remains available if needed.           Marianne Price, [phone]

## 2025-01-13 NOTE — PLAN OF CARE
Problem: Pain  Goal: Verbalizes/displays adequate comfort level or baseline comfort level  1/13/2025 1016 by Katelynn Condon RN  Outcome: Completed  1/13/2025 0842 by Katelynn Condon RN  Outcome: Progressing  Flowsheets (Taken 1/13/2025 0842)  Verbalizes/displays adequate comfort level or baseline comfort level:   Encourage patient to monitor pain and request assistance   Assess pain using appropriate pain scale   Implement non-pharmacological measures as appropriate and evaluate response   Administer analgesics based on type and severity of pain and evaluate response  Note: Maintain a well controlled pain level with ordered PRN meds  1/12/2025 2339 by Shahbaz Villegas GN  Outcome: Progressing  Flowsheets (Taken 1/12/2025 2339)  Verbalizes/displays adequate comfort level or baseline comfort level: Encourage patient to monitor pain and request assistance  Note: Pt pain is monitored and treated per orders.      Problem: Discharge Planning  Goal: Discharge to home or other facility with appropriate resources  1/13/2025 1016 by Katelynn Condon RN  Outcome: Completed  1/13/2025 0842 by Katelynn Condon RN  Outcome: Progressing  Flowsheets (Taken 1/12/2025 2339 by Shahbaz Villegas GN)  Discharge to home or other facility with appropriate resources: Identify barriers to discharge with patient and caregiver  Note: Will discharge home today  1/12/2025 2339 by Shahbaz Villegas GN  Outcome: Progressing  Flowsheets (Taken 1/12/2025 2339)  Discharge to home or other facility with appropriate resources: Identify barriers to discharge with patient and caregiver  Note: Case management when needed.      Problem: Safety - Adult  Goal: Free from fall injury  1/13/2025 1016 by Katelynn Condon RN  Outcome: Completed  1/13/2025 0842 by Katelynn Condon RN  Outcome: Progressing  Flowsheets (Taken 1/12/2025 2010 by Shahbaz Villegas GN)  Free From Fall Injury:   Instruct family/caregiver on patient safety   Based on caregiver fall

## 2025-01-13 NOTE — PLAN OF CARE
Problem: Pain  Goal: Verbalizes/displays adequate comfort level or baseline comfort level  Outcome: Progressing  Flowsheets (Taken 1/12/2025 2339)  Verbalizes/displays adequate comfort level or baseline comfort level: Encourage patient to monitor pain and request assistance  Note: Pt pain is monitored and treated per orders.      Problem: Discharge Planning  Goal: Discharge to home or other facility with appropriate resources  Outcome: Progressing  Flowsheets (Taken 1/12/2025 2338)  Discharge to home or other facility with appropriate resources: Identify barriers to discharge with patient and caregiver  Note: Case management when needed.      Problem: Safety - Adult  Goal: Free from fall injury  Outcome: Progressing  Flowsheets (Taken 1/12/2025 2010)  Free From Fall Injury:   Instruct family/caregiver on patient safety   Based on caregiver fall risk screen, instruct family/caregiver to ask for assistance with transferring infant if caregiver noted to have fall risk factors  Note: Pt and family educated to call for assistance when needed.

## 2025-01-13 NOTE — PROGRESS NOTES
4 Eyes Skin Assessment     NAME:  Jennifer Tirado  YOB: 1985  MEDICAL RECORD NUMBER:  [id number]    The patient is being assessed for  Shift Handoff    I agree that at least one RN has performed a thorough Head to Toe Skin Assessment on the patient. ALL assessment sites listed below have been assessed.      Areas assessed by both nurses:    Head, Face, Ears, Shoulders, Back, Chest, Arms, Elbows, Hands, Sacrum. Buttock, Coccyx, Ischium, Legs. Feet and Heels, and Under Medical Devices         Does the Patient have a Wound? No noted wound(s)       Jin Prevention initiated by RN: Yes  Wound Care Orders initiated by RN: No    Pressure Injury (Stage 3,4, Unstageable, DTI, NWPT, and Complex wounds) if present, place Wound referral order by RN under : No    New Ostomies, if present place, Ostomy referral order under : No     Nurse 1 eSignature: Electronically signed by JASMIN Cornelius on 1/13/25 at 7:25 AM EST    **SHARE this note so that the co-signing nurse can place an eSignature**    Nurse 2 eSignature: Electronically signed by Katelynn Condon RN on 1/13/25 at 7:30 AM EST

## 2025-01-14 ENCOUNTER — CLINICAL DOCUMENTATION (OUTPATIENT)
Facility: CLINIC | Age: 40
End: 2025-01-14

## 2025-01-14 NOTE — PROGRESS NOTES
PATIENT REQUIRES TCM OUTREACH    MRN: [id number]     PATIENT:  Jennifer Tirado    ADMIT DATE:  1/12/25     DISCHARGE DATE:  1/13/25     ADMITTING DX: acute appendicitis with lap appendectomy    MEDICATION CHANGES:   Start: Percocet  Stop: n/a  Change: n/a    SPECIALISTS:  Surgery    HOME HEALTH/WOUND CARE/PT/OT:  none    SCHEDULED FOLLOW UP APPTS:    Future Appointments   Date Time Provider Department Center   1/31/2025  1:00 PM Olinda Wolf MD [facility] DEP   3/11/2025  3:00 PM Olinda Wolf MD [facility] DEP       *Please contact patient within 2 business days and document under .TCMOFFICE.  A scheduled Hospital Follow-up for patient within 7 days is preferred*

## 2025-01-15 ENCOUNTER — TELEPHONE (OUTPATIENT)
Facility: CLINIC | Age: 40
End: 2025-01-15

## 2025-01-15 NOTE — TELEPHONE ENCOUNTER
Care Transitions Initial Follow Up Call    Outreach made within 2 business days of discharge: Yes    Patient: Jennifer Tirado Patient : 1985   MRN: [id number]  Reason for Admission: 2025  Discharge Date: 25       Spoke with: Patient     Discharge department/facility: [facility] Interactive Patient Contact:  Was patient able to fill all prescriptions: Yes  Was patient instructed to bring all medications to the follow-up visit: Yes  Is patient taking all medications as directed in the discharge summary? Yes  Does patient understand their discharge instructions: Yes  Does patient have questions or concerns that need addressed prior to 7-14 day follow up office visit: no    Additional needs identified to be addressed with provider  No needs identified             Scheduled appointment with PCP within 7-14 days    Follow Up  Future Appointments   Date Time Provider Department Center   2025  9:30 AM Dominick Verma MD [facility]   2025  1:00 PM Olinda Wolf MD [facility]   3/11/2025  3:00 PM Olinda Wolf MD [facility]       Yaneth Elizondo MA

## 2025-01-16 ENCOUNTER — TELEPHONE (OUTPATIENT)
Facility: CLINIC | Age: 40
End: 2025-01-16

## 2025-01-17 ENCOUNTER — OFFICE VISIT (OUTPATIENT)
Facility: CLINIC | Age: 40
End: 2025-01-17

## 2025-01-17 VITALS
DIASTOLIC BLOOD PRESSURE: 75 MMHG | SYSTOLIC BLOOD PRESSURE: 121 MMHG | BODY MASS INDEX: 30.63 KG/M2 | WEIGHT: 156 LBS | HEART RATE: 96 BPM | OXYGEN SATURATION: 100 % | HEIGHT: 60 IN

## 2025-01-17 DIAGNOSIS — E78.00 PURE HYPERCHOLESTEROLEMIA: ICD-10-CM

## 2025-01-17 DIAGNOSIS — N92.0 MENORRHAGIA WITH REGULAR CYCLE: ICD-10-CM

## 2025-01-17 DIAGNOSIS — Z98.84 S/P GASTRIC BYPASS: ICD-10-CM

## 2025-01-17 DIAGNOSIS — Z09 HOSPITAL DISCHARGE FOLLOW-UP: ICD-10-CM

## 2025-01-17 DIAGNOSIS — E66.811 CLASS 1 OBESITY WITH SERIOUS COMORBIDITY AND BODY MASS INDEX (BMI) OF 31.0 TO 31.9 IN ADULT, UNSPECIFIED OBESITY TYPE: ICD-10-CM

## 2025-01-17 DIAGNOSIS — D50.9 IRON DEFICIENCY ANEMIA, UNSPECIFIED IRON DEFICIENCY ANEMIA TYPE: Primary | ICD-10-CM

## 2025-01-17 SDOH — ECONOMIC STABILITY: FOOD INSECURITY: WITHIN THE PAST 12 MONTHS, YOU WORRIED THAT YOUR FOOD WOULD RUN OUT BEFORE YOU GOT MONEY TO BUY MORE.: NEVER TRUE

## 2025-01-17 SDOH — ECONOMIC STABILITY: FOOD INSECURITY: WITHIN THE PAST 12 MONTHS, THE FOOD YOU BOUGHT JUST DIDN'T LAST AND YOU DIDN'T HAVE MONEY TO GET MORE.: NEVER TRUE

## 2025-01-17 ASSESSMENT — PATIENT HEALTH QUESTIONNAIRE - PHQ9
1. LITTLE INTEREST OR PLEASURE IN DOING THINGS: NOT AT ALL
SUM OF ALL RESPONSES TO PHQ QUESTIONS 1-9: 0
SUM OF ALL RESPONSES TO PHQ QUESTIONS 1-9: 0
SUM OF ALL RESPONSES TO PHQ9 QUESTIONS 1 & 2: 0
SUM OF ALL RESPONSES TO PHQ QUESTIONS 1-9: 0
2. FEELING DOWN, DEPRESSED OR HOPELESS: NOT AT ALL
SUM OF ALL RESPONSES TO PHQ QUESTIONS 1-9: 0

## 2025-01-17 ASSESSMENT — ENCOUNTER SYMPTOMS
NAUSEA: 0
CHEST TIGHTNESS: 0
RHINORRHEA: 0
BACK PAIN: 0
DIARRHEA: 0
SHORTNESS OF BREATH: 0
ABDOMINAL PAIN: 0
VOMITING: 0
BLOOD IN STOOL: 0
WHEEZING: 0
COUGH: 0

## 2025-01-17 NOTE — PROGRESS NOTES
Jennifer Tirado is a 39 y.o. female presenting today for Follow-Up from Hospital  .     Chief Complaint   Patient presents with    Follow-Up from Hospital       HPI:  Jennifer Tirado presents to the office today for follow-up.    Patient has a past medical history of HTN, HLD, obesity s/p gastric bypass surgery in 11/2022.    Patient was admitted to the hospital from 1/12/2025 to 1/3/2025 with acute appendicitis.  She is s/p lap appendectomy.  Today-patient reports she is doing much better.  She has slight abdominal tenderness.    Patient is following with bariatric team.    Preop weight was 259 pounds.  Weight today is-156 lbs  She has been referred to plastic surgery for excessive skin.  Nystatin was refilled for skin irritation.  Patient also has extra skin around her thighs which causes chafing.  She is scheduled for panniculectomy in 3/2025    HTN: Controlled on amlodipine.    HLD: Lipid panel in 2/2024 showed LDL 80, HDL 53, triglycerides 36    Patient had elevated A1c to 6.4% in 10/2022 that has improved    Iron deficiency anemia: Hemoglobin has dropped down to 9.2. patient reports menorrhagia.  She is following with gynecology.  She has been taking oral iron pills.    Review of Systems   Constitutional:  Negative for activity change, appetite change, chills, diaphoresis, fatigue, fever and unexpected weight change.   HENT:  Negative for congestion, nosebleeds, postnasal drip and rhinorrhea.    Respiratory:  Negative for cough, chest tightness, shortness of breath and wheezing.    Cardiovascular:  Negative for chest pain and leg swelling.   Gastrointestinal:  Negative for abdominal pain, blood in stool, diarrhea, nausea and vomiting.   Endocrine: Negative for polydipsia and polyphagia.   Genitourinary:  Positive for menstrual problem and vaginal bleeding. Negative for decreased urine volume, dysuria, frequency and pelvic pain.   Musculoskeletal:  Negative for back pain, myalgias and neck pain.   Neurological:

## 2025-01-24 ENCOUNTER — TELEPHONE (OUTPATIENT)
Facility: CLINIC | Age: 40
End: 2025-01-24

## 2025-01-24 NOTE — TELEPHONE ENCOUNTER
We can do the preop on the 2/19/2025 visit.  Can you please change that . Thanks!  Discussed with patient over the phone.  Advised to also complete labs as she is anemic and may need IV iron infusions prior to the surgery

## 2025-01-30 ENCOUNTER — HOSPITAL ENCOUNTER (OUTPATIENT)
Facility: HOSPITAL | Age: 40
Setting detail: SPECIMEN
Discharge: HOME | End: 2025-02-02

## 2025-01-30 ENCOUNTER — OFFICE VISIT (OUTPATIENT)
Age: 40
End: 2025-01-30

## 2025-01-30 VITALS
WEIGHT: 155 LBS | SYSTOLIC BLOOD PRESSURE: 136 MMHG | DIASTOLIC BLOOD PRESSURE: 86 MMHG | HEART RATE: 101 BPM | TEMPERATURE: 96.9 F | OXYGEN SATURATION: 100 % | BODY MASS INDEX: 30.43 KG/M2 | HEIGHT: 60 IN | RESPIRATION RATE: 16 BRPM

## 2025-01-30 DIAGNOSIS — K35.30 ACUTE APPENDICITIS WITH LOCALIZED PERITONITIS, WITHOUT PERFORATION, ABSCESS, OR GANGRENE: Primary | ICD-10-CM

## 2025-01-30 LAB — LABCORP SPECIMEN COLLECTION: NORMAL

## 2025-01-30 PROCEDURE — 99024 POSTOP FOLLOW-UP VISIT: CPT | Performed by: COLON & RECTAL SURGERY

## 2025-01-30 PROCEDURE — 99001 SPECIMEN HANDLING PT-LAB: CPT

## 2025-01-30 NOTE — PROGRESS NOTES
Subjective: Tolerating diet and moving her bowels.  Minimal residual pain with movement.  No fevers.    Past medical history and ROS were reviewed and unchanged.     Abdomen: Soft, nontender nondistended  Wounds healed, no hernia    Pathology consistent with appendicitis    Assessment / Plan    Status post laparoscopic appendectomy for appendicitis  Diet as tolerated  No heavy lifting for 4 weeks  Follow-up in the office as needed    The diagnoses and plan were discussed with patient.  All questions answered.  Plan of care agreed to by all concerned.

## 2025-01-31 LAB
25(OH)D3+25(OH)D2 SERPL-MCNC: 78.5 NG/ML (ref 30–100)
ALBUMIN SERPL-MCNC: 4.6 G/DL (ref 3.9–4.9)
ALP SERPL-CCNC: 74 IU/L (ref 44–121)
ALT SERPL-CCNC: 12 IU/L (ref 0–32)
AST SERPL-CCNC: 16 IU/L (ref 0–40)
BASOPHILS # BLD AUTO: 0.1 X10E3/UL (ref 0–0.2)
BASOPHILS NFR BLD AUTO: 2 %
BILIRUB SERPL-MCNC: 0.4 MG/DL (ref 0–1.2)
BUN SERPL-MCNC: 10 MG/DL (ref 6–20)
BUN/CREAT SERPL: 13 (ref 9–23)
CALCIUM SERPL-MCNC: 9.6 MG/DL (ref 8.7–10.2)
CHLORIDE SERPL-SCNC: 101 MMOL/L (ref 96–106)
CHOLEST SERPL-MCNC: 163 MG/DL (ref 100–199)
CO2 SERPL-SCNC: 23 MMOL/L (ref 20–29)
CREAT SERPL-MCNC: 0.77 MG/DL (ref 0.57–1)
EGFRCR SERPLBLD CKD-EPI 2021: 101 ML/MIN/1.73
EOSINOPHIL # BLD AUTO: 0.1 X10E3/UL (ref 0–0.4)
EOSINOPHIL NFR BLD AUTO: 2 %
ERYTHROCYTE [DISTWIDTH] IN BLOOD BY AUTOMATED COUNT: 15.7 % (ref 11.7–15.4)
FERRITIN SERPL-MCNC: 10 NG/ML (ref 15–150)
FOLATE SERPL-MCNC: >20 NG/ML
GLOBULIN SER CALC-MCNC: 3.4 G/DL (ref 1.5–4.5)
GLUCOSE SERPL-MCNC: 78 MG/DL (ref 70–99)
HBA1C MFR BLD: 5.8 % (ref 4.8–5.6)
HCT VFR BLD AUTO: 32.7 % (ref 34–46.6)
HCV IGG SERPL QL IA: NON REACTIVE
HDLC SERPL-MCNC: 60 MG/DL
HGB BLD-MCNC: 9.9 G/DL (ref 11.1–15.9)
IMM GRANULOCYTES # BLD AUTO: 0 X10E3/UL (ref 0–0.1)
IMM GRANULOCYTES NFR BLD AUTO: 0 %
IRON SATN MFR SERPL: 45 % (ref 15–55)
IRON SERPL-MCNC: 160 UG/DL (ref 27–159)
LDLC SERPL CALC-MCNC: 96 MG/DL (ref 0–99)
LYMPHOCYTES # BLD AUTO: 1.1 X10E3/UL (ref 0.7–3.1)
LYMPHOCYTES NFR BLD AUTO: 33 %
MCH RBC QN AUTO: 23.6 PG (ref 26.6–33)
MCHC RBC AUTO-ENTMCNC: 30.3 G/DL (ref 31.5–35.7)
MCV RBC AUTO: 78 FL (ref 79–97)
MONOCYTES # BLD AUTO: 0.3 X10E3/UL (ref 0.1–0.9)
MONOCYTES NFR BLD AUTO: 7 %
NEUTROPHILS # BLD AUTO: 1.9 X10E3/UL (ref 1.4–7)
NEUTROPHILS NFR BLD AUTO: 56 %
PLATELET # BLD AUTO: 744 X10E3/UL (ref 150–450)
POTASSIUM SERPL-SCNC: 4.4 MMOL/L (ref 3.5–5.2)
PROT SERPL-MCNC: 8 G/DL (ref 6–8.5)
RBC # BLD AUTO: 4.2 X10E6/UL (ref 3.77–5.28)
SODIUM SERPL-SCNC: 139 MMOL/L (ref 134–144)
SPECIMEN STATUS REPORT: NORMAL
SPECIMEN STATUS REPORT: NORMAL
TIBC SERPL-MCNC: 354 UG/DL (ref 250–450)
TRIGL SERPL-MCNC: 31 MG/DL (ref 0–149)
UIBC SERPL-MCNC: 194 UG/DL (ref 131–425)
VIT B12 SERPL-MCNC: 1184 PG/ML (ref 232–1245)
VLDLC SERPL CALC-MCNC: 7 MG/DL (ref 5–40)
WBC # BLD AUTO: 3.4 X10E3/UL (ref 3.4–10.8)

## 2025-02-01 ENCOUNTER — HOSPITAL ENCOUNTER (OUTPATIENT)
Facility: HOSPITAL | Age: 40
Discharge: HOME | End: 2025-02-04
Payer: MEDICAID

## 2025-02-01 ENCOUNTER — HOSPITAL ENCOUNTER (OUTPATIENT)
Facility: HOSPITAL | Age: 40
End: 2025-02-01
Payer: MEDICAID

## 2025-02-01 DIAGNOSIS — Z01.811 PRE-OP CHEST EXAM: ICD-10-CM

## 2025-02-01 LAB
EKG ATRIAL RATE: 70 BPM
EKG DIAGNOSIS: NORMAL
EKG P AXIS: 63 DEGREES
EKG P-R INTERVAL: 148 MS
EKG Q-T INTERVAL: 354 MS
EKG QRS DURATION: 76 MS
EKG QTC CALCULATION (BAZETT): 382 MS
EKG R AXIS: 46 DEGREES
EKG T AXIS: 49 DEGREES
EKG VENTRICULAR RATE: 70 BPM

## 2025-02-01 PROCEDURE — 71046 X-RAY EXAM CHEST 2 VIEWS: CPT

## 2025-02-01 PROCEDURE — 93010 ELECTROCARDIOGRAM REPORT: CPT | Performed by: INTERNAL MEDICINE

## 2025-02-01 PROCEDURE — 93005 ELECTROCARDIOGRAM TRACING: CPT | Performed by: PLASTIC SURGERY

## 2025-02-05 LAB
COTININE SERPL-MCNC: 1.1 NG/ML
NICOTINE SERPL-MCNC: <1 NG/ML

## 2025-02-06 ENCOUNTER — TELEPHONE (OUTPATIENT)
Facility: CLINIC | Age: 40
End: 2025-02-06

## 2025-02-06 NOTE — TELEPHONE ENCOUNTER
Spoke with the patient over the phone.  She has been complaining of fatigue.  Hemoglobin was 9.9 with ferritin 10.  She is scheduled for surgery next month.    Patient has a history of gastric bypass surgery with iron deficiency.  She has been taking oral iron supplements with no significant improvement.  She also has a history of menorrhagia.  She would benefit from IV iron infusion.  Form sent to infusion center.

## 2025-02-10 ENCOUNTER — TELEPHONE (OUTPATIENT)
Facility: CLINIC | Age: 40
End: 2025-02-10

## 2025-02-10 NOTE — TELEPHONE ENCOUNTER
Patient called in about the Iron Infusion. Patient is trying to get scheduled asap since she has surgery coming up in March. Patient would like to be notified when referral is sent over so she can schedule.

## 2025-02-10 NOTE — TELEPHONE ENCOUNTER
Patient called Infusion Center to schedule Iron Infusion.  Please fax order to [phone].   Therapy with vancomycin complete and/or consult discontinued by provider.  Pharmacy will sign off, please re-consult as needed.    Thank you for the consult,  Palma Rankin  09/11/2023

## 2025-02-11 ENCOUNTER — TELEPHONE (OUTPATIENT)
Facility: CLINIC | Age: 40
End: 2025-02-11

## 2025-02-11 PROBLEM — D50.9 IRON DEFICIENCY ANEMIA: Status: ACTIVE | Noted: 2025-02-11

## 2025-02-11 RX ORDER — DIPHENHYDRAMINE HYDROCHLORIDE 50 MG/ML
50 INJECTION INTRAMUSCULAR; INTRAVENOUS
Status: CANCELLED | OUTPATIENT
Start: 2025-02-18

## 2025-02-11 RX ORDER — SODIUM CHLORIDE 9 MG/ML
INJECTION, SOLUTION INTRAVENOUS CONTINUOUS
Status: CANCELLED | OUTPATIENT
Start: 2025-02-18

## 2025-02-11 RX ORDER — ACETAMINOPHEN 325 MG/1
650 TABLET ORAL
Status: CANCELLED | OUTPATIENT
Start: 2025-02-18

## 2025-02-11 RX ORDER — EPINEPHRINE 1 MG/ML
0.3 INJECTION, SOLUTION, CONCENTRATE INTRAVENOUS PRN
Status: CANCELLED | OUTPATIENT
Start: 2025-02-18

## 2025-02-11 RX ORDER — SODIUM CHLORIDE 9 MG/ML
5-250 INJECTION, SOLUTION INTRAVENOUS PRN
Status: CANCELLED | OUTPATIENT
Start: 2025-02-18

## 2025-02-11 RX ORDER — ONDANSETRON 2 MG/ML
8 INJECTION INTRAMUSCULAR; INTRAVENOUS
Status: CANCELLED | OUTPATIENT
Start: 2025-02-18

## 2025-02-11 RX ORDER — HEPARIN 100 UNIT/ML
500 SYRINGE INTRAVENOUS PRN
Status: CANCELLED | OUTPATIENT
Start: 2025-02-18

## 2025-02-11 RX ORDER — ALBUTEROL SULFATE 90 UG/1
4 INHALANT RESPIRATORY (INHALATION) PRN
Status: CANCELLED | OUTPATIENT
Start: 2025-02-18

## 2025-02-11 RX ORDER — HYDROCORTISONE SODIUM SUCCINATE 100 MG/2ML
100 INJECTION INTRAMUSCULAR; INTRAVENOUS
Status: CANCELLED | OUTPATIENT
Start: 2025-02-18

## 2025-02-11 NOTE — TELEPHONE ENCOUNTER
Patient called in stating the infusion order needs to be changed. The current scheduling of the orders would be 2-3 weeks and that would put her after her surgery. The infusion center told her to request 1 a week instead for the infusions. They scheduled the first one but the others need to be changed so they can be done before her surgery in early March. Please advise.   25.8

## 2025-02-11 NOTE — TELEPHONE ENCOUNTER
Pt inquired on document sent over, regarding pain management.    Advised will reset password, confirmed items will be re-faxed.    Pain management confirmed with sheri another fax number items faxed over 1:50pm

## 2025-02-12 ENCOUNTER — TELEPHONE (OUTPATIENT)
Facility: CLINIC | Age: 40
End: 2025-02-12

## 2025-02-13 NOTE — TELEPHONE ENCOUNTER
Please let patient know that I will be reaching out to the infusion center to make the change.  I tried to call-could not get through the infusion center.  Will fax over new form

## 2025-02-18 ENCOUNTER — HOSPITAL ENCOUNTER (OUTPATIENT)
Facility: HOSPITAL | Age: 40
Setting detail: INFUSION SERIES
Discharge: HOME | End: 2025-02-21
Payer: MEDICAID

## 2025-02-18 VITALS
TEMPERATURE: 97.6 F | RESPIRATION RATE: 16 BRPM | HEART RATE: 82 BPM | DIASTOLIC BLOOD PRESSURE: 82 MMHG | OXYGEN SATURATION: 100 % | SYSTOLIC BLOOD PRESSURE: 128 MMHG

## 2025-02-18 DIAGNOSIS — D50.9 IRON DEFICIENCY ANEMIA, UNSPECIFIED IRON DEFICIENCY ANEMIA TYPE: Primary | ICD-10-CM

## 2025-02-18 PROCEDURE — 96366 THER/PROPH/DIAG IV INF ADDON: CPT

## 2025-02-18 PROCEDURE — 6360000002 HC RX W HCPCS: Performed by: STUDENT IN AN ORGANIZED HEALTH CARE EDUCATION/TRAINING PROGRAM

## 2025-02-18 PROCEDURE — 2500000003 HC RX 250 WO HCPCS: Performed by: STUDENT IN AN ORGANIZED HEALTH CARE EDUCATION/TRAINING PROGRAM

## 2025-02-18 PROCEDURE — 2580000003 HC RX 258: Performed by: STUDENT IN AN ORGANIZED HEALTH CARE EDUCATION/TRAINING PROGRAM

## 2025-02-18 PROCEDURE — 96365 THER/PROPH/DIAG IV INF INIT: CPT

## 2025-02-18 RX ORDER — SODIUM CHLORIDE 0.9 % (FLUSH) 0.9 %
5-40 SYRINGE (ML) INJECTION PRN
Status: ACTIVE | OUTPATIENT
Start: 2025-02-18 | End: 2025-02-19

## 2025-02-18 RX ORDER — ONDANSETRON 2 MG/ML
8 INJECTION INTRAMUSCULAR; INTRAVENOUS
Status: CANCELLED | OUTPATIENT
Start: 2025-02-19

## 2025-02-18 RX ORDER — SODIUM CHLORIDE 9 MG/ML
5-250 INJECTION, SOLUTION INTRAVENOUS PRN
Status: CANCELLED | OUTPATIENT
Start: 2025-02-19

## 2025-02-18 RX ORDER — DIPHENHYDRAMINE HYDROCHLORIDE 50 MG/ML
50 INJECTION INTRAMUSCULAR; INTRAVENOUS
Status: CANCELLED | OUTPATIENT
Start: 2025-02-19

## 2025-02-18 RX ORDER — HYDROCORTISONE SODIUM SUCCINATE 100 MG/2ML
100 INJECTION INTRAMUSCULAR; INTRAVENOUS
Status: CANCELLED | OUTPATIENT
Start: 2025-02-19

## 2025-02-18 RX ORDER — EPINEPHRINE 1 MG/ML
0.3 INJECTION, SOLUTION, CONCENTRATE INTRAVENOUS PRN
Status: CANCELLED | OUTPATIENT
Start: 2025-02-19

## 2025-02-18 RX ORDER — HEPARIN 100 UNIT/ML
500 SYRINGE INTRAVENOUS PRN
Status: CANCELLED | OUTPATIENT
Start: 2025-02-19

## 2025-02-18 RX ORDER — ALBUTEROL SULFATE 90 UG/1
4 INHALANT RESPIRATORY (INHALATION) PRN
Status: CANCELLED | OUTPATIENT
Start: 2025-02-19

## 2025-02-18 RX ORDER — SODIUM CHLORIDE 9 MG/ML
INJECTION, SOLUTION INTRAVENOUS CONTINUOUS
Status: CANCELLED | OUTPATIENT
Start: 2025-02-19

## 2025-02-18 RX ORDER — ACETAMINOPHEN 325 MG/1
650 TABLET ORAL
Status: CANCELLED | OUTPATIENT
Start: 2025-02-19

## 2025-02-18 RX ORDER — SODIUM CHLORIDE 0.9 % (FLUSH) 0.9 %
5-40 SYRINGE (ML) INJECTION PRN
Status: CANCELLED | OUTPATIENT
Start: 2025-02-19

## 2025-02-18 RX ORDER — SODIUM CHLORIDE 9 MG/ML
5-250 INJECTION, SOLUTION INTRAVENOUS PRN
Status: ACTIVE | OUTPATIENT
Start: 2025-02-18 | End: 2025-02-19

## 2025-02-18 RX ADMIN — SODIUM CHLORIDE 25 ML/HR: 9 INJECTION, SOLUTION INTRAVENOUS at 10:28

## 2025-02-18 RX ADMIN — IRON SUCROSE 300 MG: 20 INJECTION, SOLUTION INTRAVENOUS at 10:32

## 2025-02-18 RX ADMIN — SODIUM CHLORIDE, PRESERVATIVE FREE 10 ML: 5 INJECTION INTRAVENOUS at 10:25

## 2025-02-18 ASSESSMENT — PAIN - FUNCTIONAL ASSESSMENT: PAIN_FUNCTIONAL_ASSESSMENT: NONE - DENIES PAIN

## 2025-02-18 NOTE — TELEPHONE ENCOUNTER
Pt stated order place in correctly, for Iron injection urgent, suppose  to get next injection next Tuesday 25th need order fixed before she comes       PT request call back from Provider

## 2025-02-18 NOTE — PROGRESS NOTES
[facility] Progress Note    Date: 2025    Name: Jennifer Tirado    MRN: [id number]         : 1985      Ms. Tirado arrived to [facility] at 1005.    Ms. Tirado was assessed and education was provided.     Ms. Tirado's vitals were reviewed.  Vitals:    25 1007   BP: (!) 145/91   Resp: 16   Temp: 98.1 °F (36.7 °C)   SpO2: 100%   Pulse: 98    Contacted provider concerning order clarification of frequency of Venofer. Per conversation in EPIC secure chat, provider ok with patient receiving two doses of 300mg Venofer, each dose 1 week apart, prior to her surgery scheduled 3/3/25. Requested updated order to reflect changes, awaiting updated order at this time.    Venofer [employer] patient education packet reviewed with patient including adverse/side effects. Pt verbalized understanding of teaching and has no further questions at this time. Acknowledgement form signed.    24g IV inserted in patient's right posterior hand. Positive for blood return/ flushes without difficulty.    0.9% NS administered @ KVO.    300mg Venofer administered as ordered followed by NS flush.    Ms. Tirado tolerated well without complaints.     Following 30 minute observation period patient denies adverse reaction symptoms. VSS. Discharge instructions reviewed with patient, she verbalized understanding.    Vitals:    25 1250   BP: 128/82   Pulse: 82   Resp: 16   Temp: 97.6 °F (36.4 °C)   SpO2: 100%     IV removed/ intact.  Gauze/ coban to site.    Ms. Tirado [facility] in stable condition at 1255.  She is to return on 25 at 1000 for her next appointment.    Malia Barahona RN  2025

## 2025-02-25 ENCOUNTER — HOSPITAL ENCOUNTER (OUTPATIENT)
Facility: HOSPITAL | Age: 40
Setting detail: INFUSION SERIES
Discharge: HOME | End: 2025-02-28
Payer: COMMERCIAL

## 2025-02-25 VITALS
TEMPERATURE: 98.2 F | HEART RATE: 84 BPM | RESPIRATION RATE: 16 BRPM | DIASTOLIC BLOOD PRESSURE: 85 MMHG | OXYGEN SATURATION: 100 % | SYSTOLIC BLOOD PRESSURE: 127 MMHG

## 2025-02-25 DIAGNOSIS — D50.9 IRON DEFICIENCY ANEMIA, UNSPECIFIED IRON DEFICIENCY ANEMIA TYPE: Primary | ICD-10-CM

## 2025-02-25 PROCEDURE — 6360000002 HC RX W HCPCS: Performed by: STUDENT IN AN ORGANIZED HEALTH CARE EDUCATION/TRAINING PROGRAM

## 2025-02-25 PROCEDURE — 2580000003 HC RX 258: Performed by: STUDENT IN AN ORGANIZED HEALTH CARE EDUCATION/TRAINING PROGRAM

## 2025-02-25 PROCEDURE — 2500000003 HC RX 250 WO HCPCS: Performed by: STUDENT IN AN ORGANIZED HEALTH CARE EDUCATION/TRAINING PROGRAM

## 2025-02-25 PROCEDURE — 96365 THER/PROPH/DIAG IV INF INIT: CPT

## 2025-02-25 PROCEDURE — 96366 THER/PROPH/DIAG IV INF ADDON: CPT

## 2025-02-25 RX ORDER — SODIUM CHLORIDE 9 MG/ML
INJECTION, SOLUTION INTRAVENOUS CONTINUOUS
OUTPATIENT
Start: 2025-02-25

## 2025-02-25 RX ORDER — SODIUM CHLORIDE 0.9 % (FLUSH) 0.9 %
5-40 SYRINGE (ML) INJECTION PRN
OUTPATIENT
Start: 2025-02-25

## 2025-02-25 RX ORDER — DIPHENHYDRAMINE HYDROCHLORIDE 50 MG/ML
50 INJECTION INTRAMUSCULAR; INTRAVENOUS
OUTPATIENT
Start: 2025-02-25

## 2025-02-25 RX ORDER — EPINEPHRINE 1 MG/ML
0.3 INJECTION, SOLUTION, CONCENTRATE INTRAVENOUS PRN
OUTPATIENT
Start: 2025-02-25

## 2025-02-25 RX ORDER — ALBUTEROL SULFATE 90 UG/1
4 INHALANT RESPIRATORY (INHALATION) PRN
OUTPATIENT
Start: 2025-02-25

## 2025-02-25 RX ORDER — ONDANSETRON 2 MG/ML
8 INJECTION INTRAMUSCULAR; INTRAVENOUS
OUTPATIENT
Start: 2025-02-25

## 2025-02-25 RX ORDER — HYDROCORTISONE SODIUM SUCCINATE 100 MG/2ML
100 INJECTION INTRAMUSCULAR; INTRAVENOUS
OUTPATIENT
Start: 2025-02-25

## 2025-02-25 RX ORDER — SODIUM CHLORIDE 9 MG/ML
5-250 INJECTION, SOLUTION INTRAVENOUS PRN
OUTPATIENT
Start: 2025-02-25

## 2025-02-25 RX ORDER — SODIUM CHLORIDE 0.9 % (FLUSH) 0.9 %
5-40 SYRINGE (ML) INJECTION PRN
Status: ACTIVE | OUTPATIENT
Start: 2025-02-25 | End: 2025-02-26

## 2025-02-25 RX ORDER — HEPARIN 100 UNIT/ML
500 SYRINGE INTRAVENOUS PRN
OUTPATIENT
Start: 2025-02-25

## 2025-02-25 RX ORDER — SODIUM CHLORIDE 9 MG/ML
5-250 INJECTION, SOLUTION INTRAVENOUS PRN
Status: ACTIVE | OUTPATIENT
Start: 2025-02-25 | End: 2025-02-26

## 2025-02-25 RX ORDER — ACETAMINOPHEN 325 MG/1
650 TABLET ORAL
OUTPATIENT
Start: 2025-02-25

## 2025-02-25 RX ADMIN — SODIUM CHLORIDE 30 ML/HR: 9 INJECTION, SOLUTION INTRAVENOUS at 10:37

## 2025-02-25 RX ADMIN — SODIUM CHLORIDE, PRESERVATIVE FREE 10 ML: 5 INJECTION INTRAVENOUS at 10:30

## 2025-02-25 RX ADMIN — IRON SUCROSE 300 MG: 20 INJECTION, SOLUTION INTRAVENOUS at 10:40

## 2025-02-25 ASSESSMENT — PAIN - FUNCTIONAL ASSESSMENT
PAIN_FUNCTIONAL_ASSESSMENT: NONE - DENIES PAIN

## 2025-02-25 NOTE — PROGRESS NOTES
[facility] Progress Note    Date: 2025    Name: Jennifer Tirado    MRN: [id number]         : 1985    Ms. Tirado arrived in the [facility] today at 1010, ambulatory and in stable condition, here for DOSE # 2 OF 3, IV VENOFER INFUSION. She was assessed and education was provided.       ACCORDING TO HER MOST RECENT UPDATED WRITTEN ORDER DATED 25, VENOFER HAS BEEN ORDERED TO BE ADMINISTERED WEEKLY X 3 DOSES.     VENOFER 300 MG FOR DOSES 1 & 2, AND VENOFER 400 MG FOR DOSE 3.         Ms. Tirado's vitals were reviewed.  Vitals:    25 1010   BP: 137/89   Pulse: 85   Resp: 16   Temp: 98.1 °F (36.7 °C)   SpO2: 100%       Ms. Tirado presented to the infusion center today stating that she was doing well and she voiced no major complaints or concerns.     And, she stated that she tolerated dose # 1 of the VENOFER very well and without any side effects or issues.     She was however still verbally reminded of the potential side effects and adverse reactions associated with the IV VENOFER, and she verbalized understanding.       PIV # 22 G was established in her LEFT AC at 1030 without incident and brisk blood return was obtained.        ml IV BAG was initiated to infuse @ KVO throughout treatment today.       Iron Sucrose (VENOFER) 300 mg IV, was administered per order, over approximately 90 minutes without incident.      And after the completion of the VENOFER infusion, Ms. Tirado was kept for approximately 30 minutes of observation, just as a precaution. The NS was infused throughout the observation period @ 185 ml/hr.    And after the completion of the observation period, Ms. Tirado voiced no complaints and no signs of adverse or allergic reaction were noted, so therefore, her PIV was removed and gauze//coban was applied.         Ms. Tirado tolerated treatment very well today, and she voiced no complaints.     Ms. Tirado was [facility] in stable condition at 1305.     She  is to return in 2 weeks, on Tuesday, 3-11-25 at 1000, for her next appointment, to receive DOSE # 3 OF 3, IV VENOFER INFUSION.     Please note that Ms. Tirado has 3 doses of the IV VENOFER ordered. But, she is scheduled to have surgery on Monday, 3-3-25, to have excess hanging fat tissue removed from her abdominal region, (s/p gastric bypass surgery and subsequent large amount weight loss). Therefore, Dose # 3 of 3, will be delayed until at least 3-11-25 as documented above. She stated that she will call us prior to this scheduled appointment, if she feels that she is not able to return yet, due to post surgery status.     Levi You RN  February 25, 2025  10:25 AM

## 2025-02-27 ENCOUNTER — OFFICE VISIT (OUTPATIENT)
Facility: CLINIC | Age: 40
End: 2025-02-27
Payer: COMMERCIAL

## 2025-02-27 VITALS
WEIGHT: 158.8 LBS | HEART RATE: 73 BPM | OXYGEN SATURATION: 100 % | DIASTOLIC BLOOD PRESSURE: 94 MMHG | BODY MASS INDEX: 31.18 KG/M2 | SYSTOLIC BLOOD PRESSURE: 142 MMHG | HEIGHT: 60 IN

## 2025-02-27 DIAGNOSIS — D50.9 IRON DEFICIENCY ANEMIA, UNSPECIFIED IRON DEFICIENCY ANEMIA TYPE: ICD-10-CM

## 2025-02-27 DIAGNOSIS — Z01.818 PRE-OPERATIVE CLEARANCE: Primary | ICD-10-CM

## 2025-02-27 DIAGNOSIS — L98.7 EXCESSIVE AND REDUNDANT SKIN AND SUBCUTANEOUS TISSUE: ICD-10-CM

## 2025-02-27 DIAGNOSIS — I10 PRIMARY HYPERTENSION: ICD-10-CM

## 2025-02-27 DIAGNOSIS — E55.9 VITAMIN D DEFICIENCY: ICD-10-CM

## 2025-02-27 DIAGNOSIS — Z98.84 S/P GASTRIC BYPASS: ICD-10-CM

## 2025-02-27 DIAGNOSIS — E66.811 CLASS 1 OBESITY WITH SERIOUS COMORBIDITY AND BODY MASS INDEX (BMI) OF 31.0 TO 31.9 IN ADULT, UNSPECIFIED OBESITY TYPE: ICD-10-CM

## 2025-02-27 PROCEDURE — 3080F DIAST BP >= 90 MM HG: CPT | Performed by: STUDENT IN AN ORGANIZED HEALTH CARE EDUCATION/TRAINING PROGRAM

## 2025-02-27 PROCEDURE — 3077F SYST BP >= 140 MM HG: CPT | Performed by: STUDENT IN AN ORGANIZED HEALTH CARE EDUCATION/TRAINING PROGRAM

## 2025-02-27 PROCEDURE — 99214 OFFICE O/P EST MOD 30 MIN: CPT | Performed by: STUDENT IN AN ORGANIZED HEALTH CARE EDUCATION/TRAINING PROGRAM

## 2025-02-27 RX ORDER — FERROUS SULFATE 325(65) MG
325 TABLET ORAL
Qty: 90 TABLET | Refills: 1 | Status: SHIPPED | OUTPATIENT
Start: 2025-02-27

## 2025-02-27 RX ORDER — NYSTATIN 100000 [USP'U]/G
POWDER TOPICAL
Qty: 30 G | Refills: 2 | Status: SHIPPED | OUTPATIENT
Start: 2025-02-27

## 2025-02-27 RX ORDER — MULTIVIT-MIN/IRON/FOLIC ACID/K 45-800-120
CAPSULE ORAL
Qty: 90 CAPSULE | Refills: 3 | Status: SHIPPED | OUTPATIENT
Start: 2025-02-27

## 2025-02-27 RX ORDER — AMLODIPINE BESYLATE 10 MG/1
10 TABLET ORAL DAILY
Qty: 90 TABLET | Refills: 1 | Status: SHIPPED | OUTPATIENT
Start: 2025-02-27

## 2025-02-27 SDOH — ECONOMIC STABILITY: FOOD INSECURITY: WITHIN THE PAST 12 MONTHS, THE FOOD YOU BOUGHT JUST DIDN'T LAST AND YOU DIDN'T HAVE MONEY TO GET MORE.: NEVER TRUE

## 2025-02-27 SDOH — ECONOMIC STABILITY: FOOD INSECURITY: WITHIN THE PAST 12 MONTHS, YOU WORRIED THAT YOUR FOOD WOULD RUN OUT BEFORE YOU GOT MONEY TO BUY MORE.: NEVER TRUE

## 2025-02-27 ASSESSMENT — PATIENT HEALTH QUESTIONNAIRE - PHQ9
2. FEELING DOWN, DEPRESSED OR HOPELESS: NOT AT ALL
SUM OF ALL RESPONSES TO PHQ QUESTIONS 1-9: 0
SUM OF ALL RESPONSES TO PHQ9 QUESTIONS 1 & 2: 0
SUM OF ALL RESPONSES TO PHQ QUESTIONS 1-9: 0
1. LITTLE INTEREST OR PLEASURE IN DOING THINGS: NOT AT ALL
SUM OF ALL RESPONSES TO PHQ QUESTIONS 1-9: 0
SUM OF ALL RESPONSES TO PHQ QUESTIONS 1-9: 0

## 2025-02-27 ASSESSMENT — ENCOUNTER SYMPTOMS
CHEST TIGHTNESS: 0
SHORTNESS OF BREATH: 0
RHINORRHEA: 0
BACK PAIN: 0
WHEEZING: 0
VOMITING: 0
COUGH: 0
DIARRHEA: 0
ABDOMINAL PAIN: 0
NAUSEA: 0
BLOOD IN STOOL: 0

## 2025-02-27 NOTE — PROGRESS NOTES
Jennifer Tirado is a 39 y.o. female presenting today for Pre-op Exam (Patient presents for pre-op exam )  .     Chief Complaint   Patient presents with    Pre-op Exam     Patient presents for pre-op exam        HPI:  Jennifer Tirado presents to the office today for preop clearance.    Patient has a past medical history of HTN, HLD, obesity s/p gastric bypass surgery in 11/2022.    Patient was admitted to the hospital from 1/12/2025 to 1/3/2025 with acute appendicitis.  She is s/p lap appendectomy.    Patient is following with bariatric team.    Preop weight was 259 pounds.  Weight today is-158 lbs  She has been referred to plastic surgery for excessive skin.  Nystatin was refilled for skin irritation.  Patient also has extra skin around her thighs which causes chafing.  She is scheduled for panniculectomy on 3/3/2025    HTN: BP elevated today but classically controlled on amlodipine.  States that she has not taken her medication today.    HLD: Lipid panel in 1/2025 showed LDL 96, HDL 60, triglycerides 31.    Patient had elevated A1c to 6.4% in 10/2022 that has improved to 5.8% in 1/30/25.    Iron deficiency anemia: Patient's hemoglobin was 9.9 with ferritin 10.  She has received 2 sets of IV iron infusion.  Patient denies any fatigue, shortness of breath, chest pain    Review of Systems   Constitutional:  Negative for activity change, appetite change, chills, diaphoresis, fatigue, fever and unexpected weight change.   HENT:  Negative for congestion, nosebleeds, postnasal drip and rhinorrhea.    Respiratory:  Negative for cough, chest tightness, shortness of breath and wheezing.    Cardiovascular:  Negative for chest pain and leg swelling.   Gastrointestinal:  Negative for abdominal pain, blood in stool, diarrhea, nausea and vomiting.   Endocrine: Negative for polydipsia and polyphagia.   Genitourinary:  Positive for menstrual problem and vaginal bleeding. Negative for decreased urine volume, dysuria, frequency and

## 2025-02-28 ENCOUNTER — TELEPHONE (OUTPATIENT)
Facility: CLINIC | Age: 40
End: 2025-02-28

## 2025-02-28 ENCOUNTER — HOSPITAL ENCOUNTER (OUTPATIENT)
Facility: HOSPITAL | Age: 40
Setting detail: SPECIMEN
Discharge: HOME | End: 2025-03-03

## 2025-02-28 LAB — LABCORP SPECIMEN COLLECTION: NORMAL

## 2025-02-28 PROCEDURE — 99001 SPECIMEN HANDLING PT-LAB: CPT

## 2025-02-28 NOTE — TELEPHONE ENCOUNTER
[facility] called requesting the clearance form for patient's surgery on the 3/3/2025. They stated they spoke with Griselda at an early time and she requested they fax over the forms again. They need to know if we received and filled it out and to get it faxed back ASAP.    Callback number: [phone]

## 2025-03-01 LAB
BASOPHILS # BLD AUTO: 0.1 X10E3/UL (ref 0–0.2)
BASOPHILS NFR BLD AUTO: 1 %
EOSINOPHIL # BLD AUTO: 0.1 X10E3/UL (ref 0–0.4)
EOSINOPHIL NFR BLD AUTO: 1 %
ERYTHROCYTE [DISTWIDTH] IN BLOOD BY AUTOMATED COUNT: 18.7 % (ref 11.7–15.4)
HCT VFR BLD AUTO: 35.4 % (ref 34–46.6)
HGB BLD-MCNC: 10.7 G/DL (ref 11.1–15.9)
IMM GRANULOCYTES # BLD AUTO: 0 X10E3/UL (ref 0–0.1)
IMM GRANULOCYTES NFR BLD AUTO: 0 %
LYMPHOCYTES # BLD AUTO: 1.4 X10E3/UL (ref 0.7–3.1)
LYMPHOCYTES NFR BLD AUTO: 33 %
MCH RBC QN AUTO: 25.2 PG (ref 26.6–33)
MCHC RBC AUTO-ENTMCNC: 30.2 G/DL (ref 31.5–35.7)
MCV RBC AUTO: 84 FL (ref 79–97)
MONOCYTES # BLD AUTO: 0.4 X10E3/UL (ref 0.1–0.9)
MONOCYTES NFR BLD AUTO: 8 %
NEUTROPHILS # BLD AUTO: 2.4 X10E3/UL (ref 1.4–7)
NEUTROPHILS NFR BLD AUTO: 57 %
PLATELET # BLD AUTO: 419 X10E3/UL (ref 150–450)
RBC # BLD AUTO: 4.24 X10E6/UL (ref 3.77–5.28)
WBC # BLD AUTO: 4.4 X10E3/UL (ref 3.4–10.8)

## 2025-03-03 ENCOUNTER — TELEPHONE (OUTPATIENT)
Facility: CLINIC | Age: 40
End: 2025-03-03

## 2025-03-03 NOTE — TELEPHONE ENCOUNTER
URGENT    Pt requesting to discuss lab results as soon as possible  Pt stated that she is due for surgery today at 11 am    Pt requesting a call back as soon as possible    Pt contact number     [phone]

## 2025-03-11 ENCOUNTER — OFFICE VISIT (OUTPATIENT)
Facility: CLINIC | Age: 40
End: 2025-03-11
Payer: COMMERCIAL

## 2025-03-11 ENCOUNTER — HOSPITAL ENCOUNTER (OUTPATIENT)
Facility: HOSPITAL | Age: 40
Setting detail: INFUSION SERIES
End: 2025-03-11
Payer: COMMERCIAL

## 2025-03-11 VITALS
SYSTOLIC BLOOD PRESSURE: 114 MMHG | OXYGEN SATURATION: 92 % | BODY MASS INDEX: 30.63 KG/M2 | HEART RATE: 94 BPM | WEIGHT: 156 LBS | DIASTOLIC BLOOD PRESSURE: 80 MMHG | HEIGHT: 60 IN

## 2025-03-11 DIAGNOSIS — R73.03 PREDIABETES: ICD-10-CM

## 2025-03-11 DIAGNOSIS — D50.9 IRON DEFICIENCY ANEMIA, UNSPECIFIED IRON DEFICIENCY ANEMIA TYPE: Primary | ICD-10-CM

## 2025-03-11 DIAGNOSIS — E78.00 PURE HYPERCHOLESTEROLEMIA: ICD-10-CM

## 2025-03-11 DIAGNOSIS — E55.9 VITAMIN D DEFICIENCY: ICD-10-CM

## 2025-03-11 DIAGNOSIS — Z98.84 S/P GASTRIC BYPASS: ICD-10-CM

## 2025-03-11 DIAGNOSIS — I10 PRIMARY HYPERTENSION: ICD-10-CM

## 2025-03-11 PROCEDURE — 3079F DIAST BP 80-89 MM HG: CPT | Performed by: STUDENT IN AN ORGANIZED HEALTH CARE EDUCATION/TRAINING PROGRAM

## 2025-03-11 PROCEDURE — 3074F SYST BP LT 130 MM HG: CPT | Performed by: STUDENT IN AN ORGANIZED HEALTH CARE EDUCATION/TRAINING PROGRAM

## 2025-03-11 PROCEDURE — 99214 OFFICE O/P EST MOD 30 MIN: CPT | Performed by: STUDENT IN AN ORGANIZED HEALTH CARE EDUCATION/TRAINING PROGRAM

## 2025-03-11 RX ORDER — OXYCODONE HYDROCHLORIDE 5 MG/1
5 TABLET ORAL EVERY 6 HOURS PRN
COMMUNITY
Start: 2025-03-04

## 2025-03-11 SDOH — ECONOMIC STABILITY: FOOD INSECURITY: WITHIN THE PAST 12 MONTHS, THE FOOD YOU BOUGHT JUST DIDN'T LAST AND YOU DIDN'T HAVE MONEY TO GET MORE.: NEVER TRUE

## 2025-03-11 SDOH — ECONOMIC STABILITY: FOOD INSECURITY: WITHIN THE PAST 12 MONTHS, YOU WORRIED THAT YOUR FOOD WOULD RUN OUT BEFORE YOU GOT MONEY TO BUY MORE.: NEVER TRUE

## 2025-03-11 ASSESSMENT — ENCOUNTER SYMPTOMS
RHINORRHEA: 0
SHORTNESS OF BREATH: 0
DIARRHEA: 0
WHEEZING: 0
CHEST TIGHTNESS: 0
ABDOMINAL PAIN: 0
NAUSEA: 0
BLOOD IN STOOL: 0
VOMITING: 0
BACK PAIN: 0
COUGH: 0

## 2025-03-11 ASSESSMENT — PATIENT HEALTH QUESTIONNAIRE - PHQ9
SUM OF ALL RESPONSES TO PHQ QUESTIONS 1-9: 0
SUM OF ALL RESPONSES TO PHQ QUESTIONS 1-9: 0
1. LITTLE INTEREST OR PLEASURE IN DOING THINGS: NOT AT ALL
SUM OF ALL RESPONSES TO PHQ QUESTIONS 1-9: 0
2. FEELING DOWN, DEPRESSED OR HOPELESS: NOT AT ALL
SUM OF ALL RESPONSES TO PHQ QUESTIONS 1-9: 0

## 2025-03-11 NOTE — PROGRESS NOTES
Jennifer Tirado is a 39 y.o. female presenting today for Post-Op Check (Patient presents for Post-op)  .     Chief Complaint   Patient presents with    Post-Op Check     Patient presents for Post-op       HPI:  Jennifer Tirado presents to the office today for follow up.    Patient has a past medical history of HTN, HLD, obesity s/p gastric bypass surgery in 11/2022.    Patient was admitted to the hospital from 1/12/2025 to 1/3/2025 with acute appendicitis.  She is s/p lap appendectomy.    Patient is following with bariatric team.    Preop weight was 259 pounds.  Weight today is-156 lbs  She has been referred to plastic surgery for excessive skin.  Nystatin was refilled for skin irritation.  Patient also has extra skin around her thighs which causes chafing.    She is now s/p panniculectomy on 3/3/2025.  Patient is following with surgery-has a follow-up appointment scheduled on Friday.  She has 1 MAICOL drain in place which will likely be removed this week.     HTN: Controlled on amlodipine    HLD: Lipid panel in 1/2025 showed LDL 96, HDL 60, triglycerides 31.    Patient had elevated A1c to 6.4% in 10/2022 that has improved to 5.8% in 1/30/25.    Iron deficiency anemia: Patient's hemoglobin was 9.9 with ferritin 10.  She has received 2 sets of IV iron infusion.  Patient denies any fatigue, shortness of breath, chest pain.  Repeat CBC showed hemoglobin 10.7    Review of Systems   Constitutional:  Negative for activity change, appetite change, chills, diaphoresis, fatigue, fever and unexpected weight change.   HENT:  Negative for congestion, nosebleeds, postnasal drip and rhinorrhea.    Respiratory:  Negative for cough, chest tightness, shortness of breath and wheezing.    Cardiovascular:  Negative for chest pain and leg swelling.   Gastrointestinal:  Negative for abdominal pain, blood in stool, diarrhea, nausea and vomiting.   Endocrine: Negative for polydipsia and polyphagia.   Genitourinary:  Positive for menstrual problem

## 2025-03-18 ENCOUNTER — HOSPITAL ENCOUNTER (OUTPATIENT)
Facility: HOSPITAL | Age: 40
Setting detail: INFUSION SERIES
Discharge: HOME | End: 2025-03-21
Payer: COMMERCIAL

## 2025-03-18 VITALS
RESPIRATION RATE: 18 BRPM | SYSTOLIC BLOOD PRESSURE: 142 MMHG | OXYGEN SATURATION: 96 % | TEMPERATURE: 97.8 F | HEART RATE: 89 BPM | DIASTOLIC BLOOD PRESSURE: 96 MMHG

## 2025-03-18 DIAGNOSIS — D50.9 IRON DEFICIENCY ANEMIA, UNSPECIFIED IRON DEFICIENCY ANEMIA TYPE: Primary | ICD-10-CM

## 2025-03-18 PROCEDURE — 2500000003 HC RX 250 WO HCPCS: Performed by: STUDENT IN AN ORGANIZED HEALTH CARE EDUCATION/TRAINING PROGRAM

## 2025-03-18 PROCEDURE — 6360000002 HC RX W HCPCS: Performed by: STUDENT IN AN ORGANIZED HEALTH CARE EDUCATION/TRAINING PROGRAM

## 2025-03-18 PROCEDURE — 2580000003 HC RX 258: Performed by: STUDENT IN AN ORGANIZED HEALTH CARE EDUCATION/TRAINING PROGRAM

## 2025-03-18 PROCEDURE — 96366 THER/PROPH/DIAG IV INF ADDON: CPT

## 2025-03-18 PROCEDURE — 96365 THER/PROPH/DIAG IV INF INIT: CPT

## 2025-03-18 RX ORDER — HEPARIN 100 UNIT/ML
500 SYRINGE INTRAVENOUS PRN
OUTPATIENT
Start: 2025-03-18

## 2025-03-18 RX ORDER — ALBUTEROL SULFATE 90 UG/1
4 INHALANT RESPIRATORY (INHALATION) PRN
OUTPATIENT
Start: 2025-03-18

## 2025-03-18 RX ORDER — ONDANSETRON 2 MG/ML
8 INJECTION INTRAMUSCULAR; INTRAVENOUS
OUTPATIENT
Start: 2025-03-18

## 2025-03-18 RX ORDER — SODIUM CHLORIDE 0.9 % (FLUSH) 0.9 %
5-40 SYRINGE (ML) INJECTION PRN
Status: ACTIVE | OUTPATIENT
Start: 2025-03-18 | End: 2025-03-19

## 2025-03-18 RX ORDER — SODIUM CHLORIDE 9 MG/ML
INJECTION, SOLUTION INTRAVENOUS CONTINUOUS
OUTPATIENT
Start: 2025-03-18

## 2025-03-18 RX ORDER — ACETAMINOPHEN 325 MG/1
650 TABLET ORAL
OUTPATIENT
Start: 2025-03-18

## 2025-03-18 RX ORDER — DIPHENHYDRAMINE HYDROCHLORIDE 50 MG/ML
50 INJECTION, SOLUTION INTRAMUSCULAR; INTRAVENOUS
OUTPATIENT
Start: 2025-03-18

## 2025-03-18 RX ORDER — SODIUM CHLORIDE 9 MG/ML
5-250 INJECTION, SOLUTION INTRAVENOUS PRN
Status: ACTIVE | OUTPATIENT
Start: 2025-03-18 | End: 2025-03-19

## 2025-03-18 RX ORDER — SODIUM CHLORIDE 9 MG/ML
5-250 INJECTION, SOLUTION INTRAVENOUS PRN
OUTPATIENT
Start: 2025-03-18

## 2025-03-18 RX ORDER — EPINEPHRINE 1 MG/ML
0.3 INJECTION, SOLUTION, CONCENTRATE INTRAVENOUS PRN
OUTPATIENT
Start: 2025-03-18

## 2025-03-18 RX ORDER — HYDROCORTISONE SODIUM SUCCINATE 100 MG/2ML
100 INJECTION INTRAMUSCULAR; INTRAVENOUS
OUTPATIENT
Start: 2025-03-18

## 2025-03-18 RX ORDER — SODIUM CHLORIDE 0.9 % (FLUSH) 0.9 %
5-40 SYRINGE (ML) INJECTION PRN
OUTPATIENT
Start: 2025-03-18

## 2025-03-18 RX ADMIN — IRON SUCROSE 400 MG: 20 INJECTION, SOLUTION INTRAVENOUS at 09:40

## 2025-03-18 RX ADMIN — SODIUM CHLORIDE 25 ML/HR: 9 INJECTION, SOLUTION INTRAVENOUS at 09:35

## 2025-03-18 RX ADMIN — SODIUM CHLORIDE, PRESERVATIVE FREE 10 ML: 5 INJECTION INTRAVENOUS at 09:34

## 2025-03-18 NOTE — PROGRESS NOTES
hospitals Progress Note    Date: 2025    Name: Jennifer Tirado    MRN: [id number]         : 1985    Ms. Tirado arrived in the hospitals today at 0915, ambulatory and in stable condition, here for DOSE # 3 OF 3, IV VENOFER INFUSION. She was assessed and education was provided.       ACCORDING TO HER MOST RECENT UPDATED WRITTEN ORDER DATED 25, VENOFER HAS BEEN ORDERED TO BE ADMINISTERED WEEKLY X 3 DOSES.     VENOFER 300 MG FOR DOSES 1 & 2, AND VENOFER 400 MG FOR DOSE 3.         Ms. Tirado's vitals were reviewed.  Vitals:    25 0921   BP: (!) 138/94   Pulse: 83   Resp: 18   Temp: 97.7 °F (36.5 °C)   SpO2: 99%     Ms. Tirado presented to the infusion center today stating that she was doing well and she voiced no major complaints or concerns. She is having some abdominal discomfort with movement/ walking that is from her surgery on 3/3/25. States that her incisions are healing well and declines needing to rate discomfort on pain scale, also reports she is no longer needing to take pain medications.      And, she stated that she tolerated previous doses of the VENOFER very well and without any side effects or issues.         PIV 22G was established in her LEFT AC without incident. Brisk blood return was obtained and it flushed very easily.        mL IV BAG was initiated to infuse @ [facility] throughout treatment today.       Iron Sucrose (VENOFER) 400 mg IV, was administered per order without incident. Followed by NS flush.       And after the completion of the VENOFER infusion, there were no signs of adverse or allergic reactions. Ms. Tirado politely refused to stay for 30 minutes of observation. She states that she will stay with her  after leaving here and verbalized understanding to call emergency services if there are any delayed reactions.       PIV was removed, gauze//coban and pressure was applied.       Ms. Tirado tolerated treatment very well today, and she voiced no complaints. Discharge  instructions reviewed with patient and she verbalized understanding. Armband removed and shredded.     [facility] condition at 1245.     She has no further appointments here at [facility] and is to follow up with her prescribing doctor.      Neeta Sotomayor RN  March 18, 2025  9:44 AM

## 2025-07-11 ENCOUNTER — OFFICE VISIT (OUTPATIENT)
Facility: CLINIC | Age: 40
End: 2025-07-11
Payer: COMMERCIAL

## 2025-07-11 VITALS
OXYGEN SATURATION: 97 % | SYSTOLIC BLOOD PRESSURE: 123 MMHG | WEIGHT: 159 LBS | DIASTOLIC BLOOD PRESSURE: 88 MMHG | BODY MASS INDEX: 31.22 KG/M2 | HEART RATE: 88 BPM | HEIGHT: 60 IN

## 2025-07-11 DIAGNOSIS — Z98.84 S/P GASTRIC BYPASS: ICD-10-CM

## 2025-07-11 DIAGNOSIS — I10 PRIMARY HYPERTENSION: ICD-10-CM

## 2025-07-11 DIAGNOSIS — D50.9 IRON DEFICIENCY ANEMIA, UNSPECIFIED IRON DEFICIENCY ANEMIA TYPE: Primary | ICD-10-CM

## 2025-07-11 DIAGNOSIS — Z12.31 BREAST CANCER SCREENING BY MAMMOGRAM: ICD-10-CM

## 2025-07-11 DIAGNOSIS — E78.00 PURE HYPERCHOLESTEROLEMIA: ICD-10-CM

## 2025-07-11 DIAGNOSIS — N92.0 MENORRHAGIA WITH REGULAR CYCLE: ICD-10-CM

## 2025-07-11 DIAGNOSIS — R73.03 PREDIABETES: ICD-10-CM

## 2025-07-11 PROCEDURE — 3079F DIAST BP 80-89 MM HG: CPT | Performed by: STUDENT IN AN ORGANIZED HEALTH CARE EDUCATION/TRAINING PROGRAM

## 2025-07-11 PROCEDURE — 99214 OFFICE O/P EST MOD 30 MIN: CPT | Performed by: STUDENT IN AN ORGANIZED HEALTH CARE EDUCATION/TRAINING PROGRAM

## 2025-07-11 PROCEDURE — G2211 COMPLEX E/M VISIT ADD ON: HCPCS | Performed by: STUDENT IN AN ORGANIZED HEALTH CARE EDUCATION/TRAINING PROGRAM

## 2025-07-11 PROCEDURE — 3074F SYST BP LT 130 MM HG: CPT | Performed by: STUDENT IN AN ORGANIZED HEALTH CARE EDUCATION/TRAINING PROGRAM

## 2025-07-11 RX ORDER — MULTIVIT-MIN/IRON/FOLIC ACID/K 45-800-120
CAPSULE ORAL
Qty: 90 CAPSULE | Refills: 3 | Status: SHIPPED | OUTPATIENT
Start: 2025-07-11

## 2025-07-11 SDOH — ECONOMIC STABILITY: FOOD INSECURITY: WITHIN THE PAST 12 MONTHS, YOU WORRIED THAT YOUR FOOD WOULD RUN OUT BEFORE YOU GOT MONEY TO BUY MORE.: NEVER TRUE

## 2025-07-11 SDOH — ECONOMIC STABILITY: FOOD INSECURITY: WITHIN THE PAST 12 MONTHS, THE FOOD YOU BOUGHT JUST DIDN'T LAST AND YOU DIDN'T HAVE MONEY TO GET MORE.: NEVER TRUE

## 2025-07-11 ASSESSMENT — ENCOUNTER SYMPTOMS
VOMITING: 0
BLOOD IN STOOL: 0
ABDOMINAL PAIN: 0
COUGH: 0
BACK PAIN: 0
SHORTNESS OF BREATH: 0
DIARRHEA: 0
RHINORRHEA: 0
CHEST TIGHTNESS: 0
WHEEZING: 0
NAUSEA: 0

## 2025-07-11 ASSESSMENT — PATIENT HEALTH QUESTIONNAIRE - PHQ9
SUM OF ALL RESPONSES TO PHQ QUESTIONS 1-9: 0
2. FEELING DOWN, DEPRESSED OR HOPELESS: NOT AT ALL
SUM OF ALL RESPONSES TO PHQ QUESTIONS 1-9: 0
SUM OF ALL RESPONSES TO PHQ QUESTIONS 1-9: 0
1. LITTLE INTEREST OR PLEASURE IN DOING THINGS: NOT AT ALL
SUM OF ALL RESPONSES TO PHQ QUESTIONS 1-9: 0

## 2025-07-11 NOTE — PROGRESS NOTES
Jennifer Tirado is a 40 y.o. female presenting today for Follow-up  .     Chief Complaint   Patient presents with    Follow-up       HPI:  Jennifer Tirado presents to the office today for follow up.    Patient has a past medical history of HTN, HLD, obesity s/p gastric bypass surgery in 11/2022.    Patient was admitted to the hospital from 1/12/2025 to 1/3/2025 with acute appendicitis.  She is s/p lap appendectomy.    Patient is following with bariatric team.    Preop weight was 259 pounds.  Weight today is-159 lbs  She is now s/p panniculectomy on 3/3/2025.  Patient feels that she is gaining back the weight.    HTN: Controlled on amlodipine    HLD: Lipid panel in 1/2025 showed LDL 96, HDL 60, triglycerides 31.    Patient had elevated A1c to 6.4% in 10/2022 that has improved to 5.8% in 1/30/25.    Iron deficiency anemia: Patient's hemoglobin was 9.9 with ferritin 10.    Patient denies any fatigue, shortness of breath, chest pain.  Repeat CBC showed hemoglobin 10.7.  She was referred for Venofer infusions.    Repeat labs are pending    Review of Systems   Constitutional:  Negative for activity change, appetite change, chills, diaphoresis, fatigue, fever and unexpected weight change.   HENT:  Negative for congestion, nosebleeds, postnasal drip and rhinorrhea.    Respiratory:  Negative for cough, chest tightness, shortness of breath and wheezing.    Cardiovascular:  Negative for chest pain and leg swelling.   Gastrointestinal:  Negative for abdominal pain, blood in stool, diarrhea, nausea and vomiting.   Endocrine: Negative for polydipsia and polyphagia.   Genitourinary:  Positive for menstrual problem and vaginal bleeding. Negative for decreased urine volume, dysuria, frequency and pelvic pain.   Musculoskeletal:  Negative for back pain, myalgias and neck pain.   Neurological:  Negative for dizziness, tremors, seizures, syncope, speech difficulty, weakness, numbness and headaches.   Psychiatric/Behavioral:  Negative for

## 2025-07-15 ENCOUNTER — HOSPITAL ENCOUNTER (OUTPATIENT)
Facility: HOSPITAL | Age: 40
Setting detail: SPECIMEN
Discharge: HOME | End: 2025-07-18
Payer: COMMERCIAL

## 2025-07-15 DIAGNOSIS — D50.9 IRON DEFICIENCY ANEMIA, UNSPECIFIED IRON DEFICIENCY ANEMIA TYPE: ICD-10-CM

## 2025-07-15 DIAGNOSIS — R73.03 PREDIABETES: ICD-10-CM

## 2025-07-15 LAB
BASOPHILS # BLD: 0.06 K/UL (ref 0–0.1)
BASOPHILS NFR BLD: 1.6 % (ref 0–2)
DIFFERENTIAL METHOD BLD: ABNORMAL
EOSINOPHIL # BLD: 0.09 K/UL (ref 0–0.4)
EOSINOPHIL NFR BLD: 2.5 % (ref 0–5)
ERYTHROCYTE [DISTWIDTH] IN BLOOD BY AUTOMATED COUNT: 13.6 % (ref 11.6–14.5)
EST. AVERAGE GLUCOSE BLD GHB EST-MCNC: 108 MG/DL
FERRITIN SERPL-MCNC: 38 NG/ML (ref 13–400)
HBA1C MFR BLD: 5.4 % (ref 4.2–5.6)
HCT VFR BLD AUTO: 39.2 % (ref 35–45)
HGB BLD-MCNC: 12.5 G/DL (ref 12–16)
IMM GRANULOCYTES # BLD AUTO: 0.01 K/UL (ref 0–0.04)
IMM GRANULOCYTES NFR BLD AUTO: 0.3 % (ref 0–0.5)
IRON SATN MFR SERPL: 23 %
IRON SERPL-MCNC: 57 UG/DL (ref 50–175)
LYMPHOCYTES # BLD: 1.39 K/UL (ref 0.9–3.6)
LYMPHOCYTES NFR BLD: 38.2 % (ref 21–52)
MCH RBC QN AUTO: 29.3 PG (ref 24–34)
MCHC RBC AUTO-ENTMCNC: 31.9 G/DL (ref 31–37)
MCV RBC AUTO: 91.8 FL (ref 78–100)
MONOCYTES # BLD: 0.43 K/UL (ref 0.05–1.2)
MONOCYTES NFR BLD: 11.8 % (ref 3–10)
NEUTS SEG # BLD: 1.66 K/UL (ref 1.8–8)
NEUTS SEG NFR BLD: 45.6 % (ref 40–73)
NRBC # BLD: 0 K/UL (ref 0–0.01)
NRBC BLD-RTO: 0 PER 100 WBC
PLATELET # BLD AUTO: 320 K/UL (ref 135–420)
PMV BLD AUTO: 9.9 FL (ref 9.2–11.8)
RBC # BLD AUTO: 4.27 M/UL (ref 4.2–5.3)
TIBC SERPL-MCNC: 247 UG/DL (ref 250–450)
UIBC SERPL-MCNC: 190 UG/DL (ref 112–347)
WBC # BLD AUTO: 3.6 K/UL (ref 4.6–13.2)

## 2025-07-15 PROCEDURE — 83540 ASSAY OF IRON: CPT

## 2025-07-15 PROCEDURE — 83036 HEMOGLOBIN GLYCOSYLATED A1C: CPT

## 2025-07-15 PROCEDURE — 85025 COMPLETE CBC W/AUTO DIFF WBC: CPT

## 2025-07-15 PROCEDURE — 36415 COLL VENOUS BLD VENIPUNCTURE: CPT

## 2025-07-15 PROCEDURE — 82728 ASSAY OF FERRITIN: CPT

## 2025-07-15 PROCEDURE — 83550 IRON BINDING TEST: CPT

## 2025-07-22 NOTE — ED NOTES
DR MARTINEZ WAS TEXT  PERFECT SERVE     DR MARTINEZ  RETURNED CALL 10:30 AM  
Attempted to call report at this time. Luann states that the primary nurse is busy with another patient at this time.   
[facility]   SPOKE WITH MICHAEL LOPEZ  30 MINS   
Patient asked to change into a gown    
Patient in gown with hospital provided undergarments, bra removed, gripper socks placed.   
Patient is off the floor for CT.  
Pharmacy called at this time states that the antibiotic is not available here and needs to be carried over. Informed that patient will receive in the ED once available.   
Report called to PACU Nurse Judith Lund RN . All questions answered. Verified with PACU that this patient still needs antibiotics because cefoxitin isn't available in our PyXis. Patient is off the floor via stretcher with [facility].   
Report given to [facility] at this time. Patient is being transported to ROOM 502.   
TRANSFER - OUT REPORT:    Verbal report given to Jessy Villegas RN  on Jennifer Tirado  being transferred to [place] [id number] for routine progression of patient care       Report consisted of patient's Situation, Background, Assessment and   Recommendations(SBAR).     Information from the following report(s) Nurse Handoff Report was reviewed with the receiving nurse.    [place] Fall Assessment:    Presents to emergency department  because of falls (Syncope, seizure, or loss of consciousness): No  Age > 70: No  Altered Mental Status, Intoxication with alcohol or substance confusion (Disorientation, impaired judgment, poor safety awaremess, or inability to follow instructions): No  Impaired Mobility: Ambulates or transfers with assistive devices or assistance; Unable to ambulate or transer.: No  Nursing Judgement: No          Lines:   Peripheral IV 01/12/25 Left Antecubital (Active)   Site Assessment Clean, dry & intact 01/12/25 0803   Line Status Blood return noted 01/12/25 0803   Line Care Cap changed 01/12/25 0803   Phlebitis Assessment No symptoms 01/12/25 0803   Infiltration Assessment 0 01/12/25 0803   Alcohol Cap Used No 01/12/25 0803   Dressing Status Clean, dry & intact 01/12/25 0803   Dressing Type Transparent 01/12/25 0803   Dressing Intervention New 01/12/25 0803        Opportunity for questions and clarification was provided.      Patient transported with:  Monitor    Nurse made aware that we do not have the ordered antibiotic in the PyXIS.     
No

## 2025-08-14 ENCOUNTER — OFFICE VISIT (OUTPATIENT)
Age: 40
End: 2025-08-14
Payer: COMMERCIAL

## 2025-08-14 VITALS
HEART RATE: 88 BPM | SYSTOLIC BLOOD PRESSURE: 131 MMHG | HEIGHT: 60 IN | WEIGHT: 160 LBS | DIASTOLIC BLOOD PRESSURE: 89 MMHG | BODY MASS INDEX: 31.41 KG/M2

## 2025-08-14 DIAGNOSIS — Z98.84 S/P GASTRIC BYPASS: ICD-10-CM

## 2025-08-14 DIAGNOSIS — D50.9 IRON DEFICIENCY ANEMIA, UNSPECIFIED IRON DEFICIENCY ANEMIA TYPE: ICD-10-CM

## 2025-08-14 DIAGNOSIS — I10 HYPERTENSION, UNSPECIFIED TYPE: ICD-10-CM

## 2025-08-14 DIAGNOSIS — E66.811 CLASS 1 OBESITY WITH SERIOUS COMORBIDITY AND BODY MASS INDEX (BMI) OF 31.0 TO 31.9 IN ADULT, UNSPECIFIED OBESITY TYPE: ICD-10-CM

## 2025-08-14 DIAGNOSIS — Z71.3 ENCOUNTER FOR WEIGHT LOSS COUNSELING: ICD-10-CM

## 2025-08-14 DIAGNOSIS — K91.2 POSTOPERATIVE INTESTINAL MALABSORPTION: Primary | ICD-10-CM

## 2025-08-14 PROCEDURE — 3079F DIAST BP 80-89 MM HG: CPT | Performed by: NURSE PRACTITIONER

## 2025-08-14 PROCEDURE — 3075F SYST BP GE 130 - 139MM HG: CPT | Performed by: NURSE PRACTITIONER

## 2025-08-14 PROCEDURE — 99214 OFFICE O/P EST MOD 30 MIN: CPT | Performed by: NURSE PRACTITIONER

## 2025-08-15 ENCOUNTER — HOSPITAL ENCOUNTER (OUTPATIENT)
Facility: HOSPITAL | Age: 40
Discharge: HOME | End: 2025-08-18
Attending: STUDENT IN AN ORGANIZED HEALTH CARE EDUCATION/TRAINING PROGRAM
Payer: COMMERCIAL

## 2025-08-15 VITALS — WEIGHT: 159 LBS | HEIGHT: 60 IN | BODY MASS INDEX: 31.22 KG/M2

## 2025-08-15 DIAGNOSIS — Z12.31 BREAST CANCER SCREENING BY MAMMOGRAM: ICD-10-CM

## 2025-08-15 PROCEDURE — 77063 BREAST TOMOSYNTHESIS BI: CPT

## 2025-08-15 ASSESSMENT — ENCOUNTER SYMPTOMS
ABDOMINAL PAIN: 0
COUGH: 0
DIARRHEA: 0
NAUSEA: 0
BLOOD IN STOOL: 0
ABDOMINAL DISTENTION: 0
WHEEZING: 0
SHORTNESS OF BREATH: 0
CONSTIPATION: 0
VOMITING: 0
EYES NEGATIVE: 1

## 2025-08-29 ENCOUNTER — HOSPITAL ENCOUNTER (OUTPATIENT)
Facility: HOSPITAL | Age: 40
Discharge: HOME | End: 2025-09-01
Payer: COMMERCIAL

## 2025-08-29 DIAGNOSIS — R92.8 ABNORMAL MAMMOGRAM: ICD-10-CM

## 2025-08-29 DIAGNOSIS — N64.89 BREAST ASYMMETRY: ICD-10-CM

## 2025-08-29 PROCEDURE — 76642 ULTRASOUND BREAST LIMITED: CPT

## 2025-08-29 PROCEDURE — G0279 TOMOSYNTHESIS, MAMMO: HCPCS

## (undated) DEVICE — SOLUTION IRRIG 1000ML 0.9% SOD CHL USP POUR PLAS BTL

## (undated) DEVICE — RELOAD STPL L60MM H1.5-3.6MM REG TISS BLU GRIPPING SURF B

## (undated) DEVICE — SUTURE VCRL SZ 3-0 L27IN ABSRB VLT L26MM SH 1/2 CIR J316H

## (undated) DEVICE — STAPLER SKIN LN REINF 60 MM ECHELON ENDOPATH

## (undated) DEVICE — SUTURE VICRYL + SZ 0 L27IN ABSRB VLT L26MM UR-6 5/8 CIR VCP603H

## (undated) DEVICE — SUTURE VCRL SZ 0 L36IN ABSRB VLT L40MM CT 1/2 CIR J358H

## (undated) DEVICE — SLEEVE TRCR L100MM DIA5MM UNIV STBL FOR BLDELSS DIL TIP

## (undated) DEVICE — SUTURE MCRYL SZ 4-0 L27IN ABSRB UD L24MM PS-1 3/8 CIR PRIM Y935H

## (undated) DEVICE — STAPLER ECHELON 3000 45MM STANDARD

## (undated) DEVICE — SCISSORS ENDOSCP DIA5MM CRV MPLR CAUT W/ RATCH HNDL

## (undated) DEVICE — 2, DISPOSABLE SUCTION/IRRIGATOR WITH DISPOSABLE TIP: Brand: STRYKEFLOW

## (undated) DEVICE — CORD ES L10FT MPLR LAP

## (undated) DEVICE — SHEARS ENDOSCP HARM 36CM ULTRASONIC CRV TIP UPGRD

## (undated) DEVICE — TRAY,URINE METER,100% SILICONE,16FR10ML: Brand: MEDLINE

## (undated) DEVICE — SUT ETHBND 2-0 36IN SH DA GRN --

## (undated) DEVICE — Device

## (undated) DEVICE — ELECTRODE PT RET AD L9FT HI MOIST COND ADH HYDRGEL CORDED

## (undated) DEVICE — DRAPE,LAP,CHOLE,W/TROUGHS,STERILE: Brand: MEDLINE

## (undated) DEVICE — STAPLER INT L37CM STPL 25MM CIR ENDOSCP CRV INTLUMN B FRM

## (undated) DEVICE — STAPLE INT WHT BLU G GRN BLK REINF FOR ENDOPATH ECHELON FLX

## (undated) DEVICE — TROCARS: Brand: KII® BALLOON BLUNT TIP SYSTEM

## (undated) DEVICE — SOLUTION IV 1000ML 0.9% SOD CHL

## (undated) DEVICE — SUT SLK 2-0SH 30IN BLK --

## (undated) DEVICE — GRASPER ENDOSCP TIP L10MM ANVIL ROT RATCH HNDL DISP

## (undated) DEVICE — Z DUP USE 2366546 SET SUCT IRR TIP DISP STRYKEFLOW2

## (undated) DEVICE — NEEDLE HYPO 25GA L1.5IN BVL ORIENTED ECLIPSE

## (undated) DEVICE — SUTURE VCRL SZ 3-0 L27IN ABSRB UD L26MM SH 1/2 CIR J416H

## (undated) DEVICE — SOLUTION ANTIFOG VIS SYS CLEARIFY LAPSCP

## (undated) DEVICE — PREP SKN CHLRAPRP APL 26ML STR --

## (undated) DEVICE — SUTURE PDS + SZ 0 L27IN ABSRB VLT L36MM CT 1 1 2 CIR PDP340H

## (undated) DEVICE — TROCAR ENDOSCP L100MM DIA12MM STBL SL BLDELSS ENDOPATH XCEL

## (undated) DEVICE — TAPE ADH W3INXL10YD PLAS TRNSPAR H2O RESIST HYPOALRG CURAD

## (undated) DEVICE — TRUE CONTENT TO BE POPULATED AS PART OF REBRANDING: Brand: ARGYLE

## (undated) DEVICE — SUTURE MONOCRYL SZ 4-0 L27IN ABSRB UD L24MM PS-1 3/8 CIR PRIM Y935H

## (undated) DEVICE — FORCEPS LAP DIA5MM BCOCK FEN TIP ROT NONARTICULATING LOK

## (undated) DEVICE — DISPOSABLE LAPAROSCOPIC CLIP APPLIER WITH 20 CLIPS.: Brand: EPIX® UNIVERSAL CLIP APPLIER

## (undated) DEVICE — TROCAR: Brand: KII® SLEEVE

## (undated) DEVICE — STERILE POLYISOPRENE POWDER-FREE SURGICAL GLOVES: Brand: PROTEXIS

## (undated) DEVICE — LAPAROSCOPIC SCISSORS: Brand: EPIX LAPAROSCOPIC SCISSORS

## (undated) DEVICE — TISSUE RETRIEVAL SYSTEM: Brand: INZII RETRIEVAL SYSTEM

## (undated) DEVICE — CLEAN UP KIT: Brand: MEDLINE INDUSTRIES, INC.

## (undated) DEVICE — SOLUTION IRRIG 1000ML H2O STRL BLT

## (undated) DEVICE — RELOAD STPL L45MM H1.5-3.6MM REG TISS BLU GRIPPING SURF B

## (undated) DEVICE — APPLIER CLP M L L11.4IN DIA10MM ENDOSCP ROT MULT FOR LIG

## (undated) DEVICE — SYRINGE MED 30ML STD CLR PLAS LUERLOCK TIP N CTRL DISP

## (undated) DEVICE — INSUFFLATION NEEDLE TO ESTABLISH PNEUMOPERITONEUM.: Brand: INSUFFLATION NEEDLE

## (undated) DEVICE — TROCAR: Brand: KII® OPTICAL ACCESS SYSTEM

## (undated) DEVICE — 4-PORT MANIFOLD: Brand: NEPTUNE 2

## (undated) DEVICE — 3M™ STERI-DRAPE™ INSTRUMENT POUCH 1018L: Brand: STERI-DRAPE™

## (undated) DEVICE — STAPLER INT L34CM 60MM LNG ENDOSCP ARTC PWR + ECHELON FLX

## (undated) DEVICE — SOLUTION IV LACTATED RINGERS INJECTION USP 1000ML

## (undated) DEVICE — GAUZE SPONGES,8 PLY: Brand: CURITY

## (undated) DEVICE — STRIP,CLOSURE,WOUND,MEDI-STRIP,1/2X4: Brand: MEDLINE

## (undated) DEVICE — TROCAR LAP L100MM DIA5MM BLDELSS W/ STBL SL ENDOPATH XCEL

## (undated) DEVICE — LEGGINGS, PAIR, 31X48, STERILE: Brand: MEDLINE

## (undated) DEVICE — SUTURE ETHLN SZ 4-0 L18IN NONABSORBABLE BLK L19MM PC-5 3/8 1894G

## (undated) DEVICE — RELOAD STPL L60MM H1-2.6MM MESENTERY THN TISS WHT 6 ROW

## (undated) DEVICE — ADHESIVE LIQ MED 2/3 CC VI DEV REL INJ LO RISK MASTISOL

## (undated) DEVICE — 3M™ TEGADERM™ HP TRANSPARENT FILM DRESSING FRAME STYLE, 9534HP, 2-3/8 X 2-3/4 IN (6 CM X 7 CM), 100/CT 4CT/CASE: Brand: 3M™ TEGADERM™

## (undated) DEVICE — VISUALIZATION SYSTEM: Brand: CLEARIFY

## (undated) DEVICE — GARMENT,MEDLINE,DVT,INT,CALF,MED, GEN2: Brand: MEDLINE

## (undated) DEVICE — STAPLER SKIN L440MM 32MM LNG 12 FIRING B FRM PWR + GRIPPING

## (undated) DEVICE — REM POLYHESIVE ADULT PATIENT RETURN ELECTRODE: Brand: VALLEYLAB

## (undated) DEVICE — BLANKET WRM AD W50XL85.8IN PACU FULL BODY FORC AIR

## (undated) DEVICE — BLADE,STAINLESS-STEEL,11,STRL,DISPOSABLE: Brand: MEDLINE

## (undated) DEVICE — LAPAROSCOPIC TROCAR SLEEVE/SINGLE USE: Brand: KII® OPTICAL ACCESS SYSTEM

## (undated) DEVICE — SYR 50ML LR LCK 1ML GRAD NSAF --

## (undated) DEVICE — BLANKET WRM W29.9XL79.1IN UP BODY FORC AIR MISTRAL-AIR

## (undated) DEVICE — PACK PROCEDURE SURG LAPAROSCOPY 17X7 MM BRTRC PRIMUS

## (undated) DEVICE — TROCAR ENDOSCP SHFT L100MM DIA12MM INTEGR STBL ENDOPATH